# Patient Record
Sex: MALE | Race: WHITE | NOT HISPANIC OR LATINO | Employment: OTHER | URBAN - METROPOLITAN AREA
[De-identification: names, ages, dates, MRNs, and addresses within clinical notes are randomized per-mention and may not be internally consistent; named-entity substitution may affect disease eponyms.]

---

## 2017-02-21 ENCOUNTER — APPOINTMENT (OUTPATIENT)
Dept: PREADMISSION TESTING | Facility: HOSPITAL | Age: 72
End: 2017-02-21

## 2017-02-21 ENCOUNTER — HOSPITAL ENCOUNTER (OUTPATIENT)
Dept: GENERAL RADIOLOGY | Facility: HOSPITAL | Age: 72
Discharge: HOME | End: 2017-02-21

## 2017-02-21 ENCOUNTER — HOSPITAL ENCOUNTER (OUTPATIENT)
Dept: GENERAL RADIOLOGY | Facility: HOSPITAL | Age: 72
Discharge: HOME | End: 2017-02-21
Admitting: ORTHOPAEDIC SURGERY

## 2017-02-21 VITALS
SYSTOLIC BLOOD PRESSURE: 145 MMHG | TEMPERATURE: 97.6 F | HEART RATE: 78 BPM | HEIGHT: 66 IN | OXYGEN SATURATION: 97 % | WEIGHT: 191.19 LBS | BODY MASS INDEX: 30.73 KG/M2 | RESPIRATION RATE: 16 BRPM | DIASTOLIC BLOOD PRESSURE: 83 MMHG

## 2017-02-21 DIAGNOSIS — M25.552 LEFT HIP PAIN: Primary | ICD-10-CM

## 2017-02-21 LAB
ALBUMIN SERPL-MCNC: 4.5 G/DL (ref 3.5–5.2)
ALBUMIN/GLOB SERPL: 1.5 G/DL
ALP SERPL-CCNC: 102 U/L (ref 39–117)
ALT SERPL W P-5'-P-CCNC: 24 U/L (ref 1–41)
ANION GAP SERPL CALCULATED.3IONS-SCNC: 14 MMOL/L
APTT PPP: 27.5 SECONDS (ref 22.7–35.4)
AST SERPL-CCNC: 22 U/L (ref 1–40)
BASOPHILS # BLD AUTO: 0.04 10*3/MM3 (ref 0–0.2)
BASOPHILS NFR BLD AUTO: 0.5 % (ref 0–1.5)
BILIRUB SERPL-MCNC: 0.6 MG/DL (ref 0.1–1.2)
BILIRUB UR QL STRIP: NEGATIVE
BUN BLD-MCNC: 18 MG/DL (ref 8–23)
BUN/CREAT SERPL: 17.6 (ref 7–25)
CALCIUM SPEC-SCNC: 9.3 MG/DL (ref 8.6–10.5)
CHLORIDE SERPL-SCNC: 101 MMOL/L (ref 98–107)
CLARITY UR: CLEAR
CO2 SERPL-SCNC: 25 MMOL/L (ref 22–29)
COLOR UR: YELLOW
CREAT BLD-MCNC: 1.02 MG/DL (ref 0.76–1.27)
DEPRECATED RDW RBC AUTO: 48.7 FL (ref 37–54)
EOSINOPHIL # BLD AUTO: 0.32 10*3/MM3 (ref 0–0.7)
EOSINOPHIL NFR BLD AUTO: 3.7 % (ref 0.3–6.2)
ERYTHROCYTE [DISTWIDTH] IN BLOOD BY AUTOMATED COUNT: 15.2 % (ref 11.5–14.5)
GFR SERPL CREATININE-BSD FRML MDRD: 72 ML/MIN/1.73
GLOBULIN UR ELPH-MCNC: 3 GM/DL
GLUCOSE BLD-MCNC: 89 MG/DL (ref 65–99)
GLUCOSE UR STRIP-MCNC: NEGATIVE MG/DL
HCT VFR BLD AUTO: 45.3 % (ref 40.4–52.2)
HGB BLD-MCNC: 15.7 G/DL (ref 13.7–17.6)
HGB UR QL STRIP.AUTO: NEGATIVE
IMM GRANULOCYTES # BLD: 0.04 10*3/MM3 (ref 0–0.03)
IMM GRANULOCYTES NFR BLD: 0.5 % (ref 0–0.5)
INR PPP: 0.97 (ref 0.9–1.1)
KETONES UR QL STRIP: NEGATIVE
LEUKOCYTE ESTERASE UR QL STRIP.AUTO: NEGATIVE
LYMPHOCYTES # BLD AUTO: 3.18 10*3/MM3 (ref 0.9–4.8)
LYMPHOCYTES NFR BLD AUTO: 36.5 % (ref 19.6–45.3)
MCH RBC QN AUTO: 30 PG (ref 27–32.7)
MCHC RBC AUTO-ENTMCNC: 34.7 G/DL (ref 32.6–36.4)
MCV RBC AUTO: 86.6 FL (ref 79.8–96.2)
MONOCYTES # BLD AUTO: 0.69 10*3/MM3 (ref 0.2–1.2)
MONOCYTES NFR BLD AUTO: 7.9 % (ref 5–12)
NEUTROPHILS # BLD AUTO: 4.44 10*3/MM3 (ref 1.9–8.1)
NEUTROPHILS NFR BLD AUTO: 50.9 % (ref 42.7–76)
NITRITE UR QL STRIP: NEGATIVE
PH UR STRIP.AUTO: <=5 [PH] (ref 5–8)
PLATELET # BLD AUTO: 225 10*3/MM3 (ref 140–500)
PMV BLD AUTO: 7.9 FL (ref 6–12)
POTASSIUM BLD-SCNC: 4.4 MMOL/L (ref 3.5–5.2)
PROT SERPL-MCNC: 7.5 G/DL (ref 6–8.5)
PROT UR QL STRIP: NEGATIVE
PROTHROMBIN TIME: 12.5 SECONDS (ref 11.7–14.2)
RBC # BLD AUTO: 5.23 10*6/MM3 (ref 4.6–6)
SODIUM BLD-SCNC: 140 MMOL/L (ref 136–145)
SP GR UR STRIP: 1.02 (ref 1–1.03)
UROBILINOGEN UR QL STRIP: NORMAL
WBC NRBC COR # BLD: 8.71 10*3/MM3 (ref 4.5–10.7)

## 2017-02-21 PROCEDURE — 73502 X-RAY EXAM HIP UNI 2-3 VIEWS: CPT

## 2017-02-21 PROCEDURE — G8978 MOBILITY CURRENT STATUS: HCPCS

## 2017-02-21 PROCEDURE — 85730 THROMBOPLASTIN TIME PARTIAL: CPT | Performed by: ORTHOPAEDIC SURGERY

## 2017-02-21 PROCEDURE — G8980 MOBILITY D/C STATUS: HCPCS

## 2017-02-21 PROCEDURE — 80053 COMPREHEN METABOLIC PANEL: CPT | Performed by: ORTHOPAEDIC SURGERY

## 2017-02-21 PROCEDURE — 36415 COLL VENOUS BLD VENIPUNCTURE: CPT

## 2017-02-21 PROCEDURE — 85025 COMPLETE CBC W/AUTO DIFF WBC: CPT | Performed by: ORTHOPAEDIC SURGERY

## 2017-02-21 PROCEDURE — 81003 URINALYSIS AUTO W/O SCOPE: CPT | Performed by: ORTHOPAEDIC SURGERY

## 2017-02-21 PROCEDURE — 97161 PT EVAL LOW COMPLEX 20 MIN: CPT

## 2017-02-21 PROCEDURE — 71020 HC CHEST PA AND LATERAL: CPT

## 2017-02-21 PROCEDURE — 85610 PROTHROMBIN TIME: CPT | Performed by: ORTHOPAEDIC SURGERY

## 2017-02-21 PROCEDURE — G8979 MOBILITY GOAL STATUS: HCPCS

## 2017-02-21 RX ORDER — IBUPROFEN 400 MG/1
400 TABLET ORAL EVERY 6 HOURS PRN
COMMUNITY
End: 2020-06-29 | Stop reason: ALTCHOICE

## 2017-02-21 RX ORDER — ATORVASTATIN CALCIUM 20 MG/1
20 TABLET, FILM COATED ORAL EVERY EVENING
COMMUNITY

## 2017-02-21 RX ORDER — CHLORHEXIDINE GLUCONATE 500 MG/1
1 CLOTH TOPICAL TAKE AS DIRECTED
Status: ON HOLD | COMMUNITY
End: 2017-03-09

## 2017-02-21 NOTE — DISCHARGE INSTRUCTIONS
Take the following medications the morning of surgery with a small sip of water.    NONE    General Instructions:  • Do not eat or drink after midnight: includes water, mints, or gum. You may brush your teeth.  • Do not smoke, chew tobacco, or drink alcohol.  • Bring medications in original bottles, any inhalers and if applicable your C-PAP/ BI-PAP machine.  • Bring any papers given to you in the doctor’s office.  • Wear clean comfortable clothes and socks.  • Leave all other valuables and jewelry at home.        2% CHLORAHEXIDINE GLUCONATE* CLOTH  Preparing or “prepping” skin before surgery can reduce the risk of infection at the surgical site. To make the process easier, [facility] has chosen disposable cloths moistened with a rinse-free, 2% Chlorhexidine Gluconate (CHG) antiseptic solution. The steps below outline the prepping process and should be carefully followed.        Use the prep cloth on the area that is circled in the diagram             Directions Night before Surgery  1) Shower using a fresh bar of anti-bacterial soap (such as Dial) and clean washcloth.  Use a clean towel to completely dry your skin.  2) Do not use any lotions, oils or creams on your skin.  3) Open the package and remove 1 cloth, wipe your skin for 30 seconds in a circular motion.  Allow to dry for 3 minutes.  4) Repeat #3 with second cloth.  5) Do not touch your eyes, ears, or mouth with the prep cloth.  6) Allow the wet prep solution to air dry.  7) Discard the prep cloth and wash your hands with soap and water.   8) Dress in clean bed clothes and sleep on fresh clean bed sheets.   9) You may experience some temporary itching after the prep.    Directions Day of Surgery  1) Repeat steps 1,2,3,4,5,6,7, and 9.   2) Dress in clean clothes before coming to the hospital.    BACTROBAN NASAL OINTMENT  There are many germs normally in your nose. Bactroban is an ointment that will help reduce these germs. Please follow these  instructions for Bactroban use:    __1__The day before surgery in the morning  [id number]_[id number]_______    _2___The day before surgery in the evening              Vjyo__6-8-63_____    _3___The day of surgery in the morning    Rsfi__4-1-63______    **Squirt ½ package of Bactroban Ointment onto a cotton applicator and apply to inside of 1st nostril.  Squirt the remaining Bactroban and apply to the inside of the other nostril.      Preventing a Surgical Site Infection:  Shower on the morning of surgery using a fresh bar of anti-bacterial soap (such as Dial) and clean washcloth.  Dry with a clean towel and dress in clean clothing.  For 2 to 3 days before surgery, avoid shaving with a razor near where you will have surgery because the razor can irritate skin and make it easier to develop an infection  Ask your surgeon if you will be receiving antibiotics prior to surgery  Make sure you, your family, and all healthcare providers clean their hands with soap and water or an alcohol based hand  before caring for you or your wound  If at all possible, quit smoking as many days before surgery as you can.    Day of surgery:  Upon arrival, a Pre-op nurse and Anesthesiologist will review your health history, obtain vital signs, and answer questions you may have.  The only belongings needed at this time will be your home medications and if applicable your C-PAP/BI-PAP machine.  If you are staying overnight your family can leave the rest of your belongings in the car and bring them to your room later.  A Pre-op nurse will start an IV and you may receive medication in preparation for surgery, including something to help you relax.  Your family will be able to see you in the Pre-op area.  While you are in surgery your family should notify the waiting room  if they leave the waiting room area and provide a contact phone number.    Please be aware that surgery does come with discomfort.  We want to make every effort to  control your discomfort so please discuss any uncontrolled symptoms with your nurse.   Your doctor will most likely have prescribed pain medications.        If you are staying overnight following surgery, you will be transported to your hospital room following the recovery period.  [facility] has all private rooms.    If you have any questions please call Pre-Admission Testing at [phone].    Deductibles and co-payments are collected on the day of service. Please be prepared to pay the required co-pay, deductible or deposit on the day of service as defined by your plan.

## 2017-02-21 NOTE — PROGRESS NOTES
Physical Therapy Outpatient Preoperative Total Joint Evaluation     Patient Name: José Moody  : 1945  MRN: [id number]  Today's Date: 2017         Surgery Date: 17    Patient Active Problem List   Diagnosis   • Abnormal thallium stress test   • Atrial fibrillation   • S/P CABG (coronary artery bypass graft)   • Chest pain   • Hyperlipidemia   • Shortness of breath   • Weak pulse   • SOB (shortness of breath)   • Chronic coronary artery disease        Past Medical History   Diagnosis Date   • Anxiety    • Arthritis      LT HIP   • Coronary artery disease    • High cholesterol    • History of atrial fibrillation      LAST HAD    • Limited joint range of motion      LT HIP   • PONV (postoperative nausea and vomiting)      AFTER TONSILLECTOMY        Past Surgical History   Procedure Laterality Date   • Coronary artery bypass graft       3 VESSELS    • Tonsillectomy     • Colonoscopy                TOTAL JOINT PREOP EVAL (last 72 hours)      Total Joint Preop Evaluation       17 1150                Preoperative Evaluation    Surgery THR: Left  -TV        Surgery Date 17  -TV        Previous Total Joint No  -TV        Attended Class yes  -TV        Prior Activity Status    All Household independent  -TV        All Community independent  -TV        Gait independent   cane  -TV        Transfers independent  -TV         Child  -TV        DC Plans Home  -TV        Assist at home Child  -TV        Home Environment 1 story;basement;lives first floor  -TV        Exterior steps none   2  -TV        Interior steps 1 rail   14  -TV        Pain 0-10    Pain Level 7  -TV        Location l hip  -TV        LE Measurements - ROM    Hip Flexion - Right AROM is WNL;Strength is grossly > or equal to 3/5  -TV        Hip Abduction - Right AROM is WNL;Strength is grossly > or equal to 3/5  -TV        Knee Flexion - Right AROM is WNL;Strength is grossly > or equal to 3/5  -TV         Knee Extension - Right AROM is WNL;Strength is grossly > or equal to 3/5  -TV        Hip Flexion - Left --   hip motion very limited and painful in all planes  -TV        Knee Flexion - Left AROM is WNL;Strength is grossly > or equal to 3/5  -TV        Knee Extension - Left AROM is WNL;Strength is grossly > or equal to 3/5  -TV        UE ROM/Strength    UE ROM/Strength adequate for walker use  -TV        Other Measurements    Sensation/Circulation intact  -TV        Gait Observation cane  -TV        Equipment    Equipment Patient Has Cane;Walker  -TV        Equipment Patient Needs Bedside commode  -TV        ASSESSMENT    Goal Patient demonstrates good understanding of post-op P.T.;Exercises;Surgical Procedure  -TV        Goal Met? Yes  -TV        Anticipated Progress good  -TV        Patient agrees with Plan of Treatment? Yes  -TV        Plan Will see for post op TJR protocol  -TV          User Key  (r) = Recorded By, (t) = Taken By, (c) = Cosigned By    Initials Name Effective Dates    TV Charley Laird, PT 01/07/16 -              Time Calculation:          Therapy Charges for Today     Code Description Service Date Service Provider Modifiers Qty    48537344909 HC PT MOBILITY CURRENT 2/21/2017 Charley Laird, PT GP, CI 1    86737857143 HC PT MOBILITY PROJECTED 2/21/2017 Charley Laird, PT GP, CI 1    93061540407 HC PT MOBILITY DISCHARGE 2/21/2017 Charley Laird, PT GP, CI 1    77547160724 HC PAT-TOTAL JOINT CLASS 2/21/2017 Charley Laird, PT  1    77022285938 HC PT EVAL LOW COMPLEXITY 1 2/21/2017 Charley Laird, PT GP 1            PT G-Codes  PT Professional Judgement Used?: Yes  Functional Limitation: Mobility: Walking and moving around  Mobility: Walking and Moving Around Current Status (): At least 1 percent but less than 20 percent impaired, limited or restricted  Mobility: Walking and Moving Around Goal Status (): At least 1 percent but less than 20 percent impaired, limited or restricted  Mobility: Walking and  Moving Around Discharge Status (): At least 1 percent but less than 20 percent impaired, limited or restricted      Charley Laird, PT  2/21/2017

## 2017-03-09 ENCOUNTER — APPOINTMENT (OUTPATIENT)
Dept: GENERAL RADIOLOGY | Facility: HOSPITAL | Age: 72
End: 2017-03-09

## 2017-03-09 ENCOUNTER — ANESTHESIA (OUTPATIENT)
Dept: PERIOP | Facility: HOSPITAL | Age: 72
End: 2017-03-09

## 2017-03-09 ENCOUNTER — HOSPITAL ENCOUNTER (INPATIENT)
Facility: HOSPITAL | Age: 72
LOS: 1 days | Discharge: HOME HEALTH | End: 2017-03-10
Attending: ORTHOPAEDIC SURGERY | Admitting: ORTHOPAEDIC SURGERY

## 2017-03-09 ENCOUNTER — ANESTHESIA EVENT (OUTPATIENT)
Dept: PERIOP | Facility: HOSPITAL | Age: 72
End: 2017-03-09

## 2017-03-09 DIAGNOSIS — M16.10 PRIMARY OSTEOARTHRITIS OF HIP, UNSPECIFIED LATERALITY: Primary | ICD-10-CM

## 2017-03-09 PROBLEM — M16.9 DEGENERATIVE JOINT DISEASE (DJD) OF HIP: Status: ACTIVE | Noted: 2017-03-09

## 2017-03-09 LAB
ABO GROUP BLD: NORMAL
BLD GP AB SCN SERPL QL: NEGATIVE
RH BLD: POSITIVE
TROPONIN T SERPL-MCNC: <0.01 NG/ML (ref 0–0.03)

## 2017-03-09 PROCEDURE — 25010000002 FENTANYL CITRATE (PF) 100 MCG/2ML SOLUTION: Performed by: NURSE ANESTHETIST, CERTIFIED REGISTERED

## 2017-03-09 PROCEDURE — 25010000002 MIDAZOLAM PER 1 MG: Performed by: ANESTHESIOLOGY

## 2017-03-09 PROCEDURE — 25010000002 ONDANSETRON PER 1 MG: Performed by: NURSE ANESTHETIST, CERTIFIED REGISTERED

## 2017-03-09 PROCEDURE — 73501 X-RAY EXAM HIP UNI 1 VIEW: CPT

## 2017-03-09 PROCEDURE — 86901 BLOOD TYPING SEROLOGIC RH(D): CPT | Performed by: ORTHOPAEDIC SURGERY

## 2017-03-09 PROCEDURE — 25010000002 PROPOFOL 10 MG/ML EMULSION: Performed by: NURSE ANESTHETIST, CERTIFIED REGISTERED

## 2017-03-09 PROCEDURE — C1776 JOINT DEVICE (IMPLANTABLE): HCPCS | Performed by: ORTHOPAEDIC SURGERY

## 2017-03-09 PROCEDURE — 25010000002 KETOROLAC TROMETHAMINE PER 15 MG: Performed by: ORTHOPAEDIC SURGERY

## 2017-03-09 PROCEDURE — 86850 RBC ANTIBODY SCREEN: CPT | Performed by: ORTHOPAEDIC SURGERY

## 2017-03-09 PROCEDURE — 93005 ELECTROCARDIOGRAM TRACING: CPT | Performed by: NURSE ANESTHETIST, CERTIFIED REGISTERED

## 2017-03-09 PROCEDURE — 25010000002 DEXAMETHASONE PER 1 MG: Performed by: NURSE ANESTHETIST, CERTIFIED REGISTERED

## 2017-03-09 PROCEDURE — 0SRB04Z REPLACEMENT OF LEFT HIP JOINT WITH CERAMIC ON POLYETHYLENE SYNTHETIC SUBSTITUTE, OPEN APPROACH: ICD-10-PCS | Performed by: ORTHOPAEDIC SURGERY

## 2017-03-09 PROCEDURE — 25010000003 CEFAZOLIN IN DEXTROSE 2-4 GM/100ML-% SOLUTION: Performed by: ORTHOPAEDIC SURGERY

## 2017-03-09 PROCEDURE — 25010000002 HYDROMORPHONE PER 4 MG: Performed by: NURSE ANESTHETIST, CERTIFIED REGISTERED

## 2017-03-09 PROCEDURE — 93010 ELECTROCARDIOGRAM REPORT: CPT | Performed by: INTERNAL MEDICINE

## 2017-03-09 PROCEDURE — 25010000002 VANCOMYCIN PER 500 MG: Performed by: ORTHOPAEDIC SURGERY

## 2017-03-09 PROCEDURE — 86900 BLOOD TYPING SEROLOGIC ABO: CPT | Performed by: ORTHOPAEDIC SURGERY

## 2017-03-09 PROCEDURE — 94799 UNLISTED PULMONARY SVC/PX: CPT

## 2017-03-09 PROCEDURE — 84484 ASSAY OF TROPONIN QUANT: CPT | Performed by: ORTHOPAEDIC SURGERY

## 2017-03-09 DEVICE — IMPLANTABLE DEVICE
Type: IMPLANTABLE DEVICE | Site: HIP | Status: FUNCTIONAL
Brand: G7® ACETABULAR SYSTEM

## 2017-03-09 DEVICE — IMPLANTABLE DEVICE
Type: IMPLANTABLE DEVICE | Site: HIP | Status: FUNCTIONAL
Brand: G7 ACETABULAR LINER

## 2017-03-09 DEVICE — PRT HIP PRIMARY MIXED CONSTRUCT 1 OF 2: Type: IMPLANTABLE DEVICE | Site: HIP | Status: FUNCTIONAL

## 2017-03-09 DEVICE — BIOLOX® DELTA, CERAMIC FEMORAL HEAD, S, Ø 36/-3.5, TAPER 12/14
Type: IMPLANTABLE DEVICE | Site: HIP | Status: FUNCTIONAL
Brand: BIOLOX® DELTA

## 2017-03-09 DEVICE — PRT HIP PRIMARY MIXED CONSTRUCT 2 OF 2: Type: IMPLANTABLE DEVICE | Site: HIP | Status: FUNCTIONAL

## 2017-03-09 DEVICE — IMPLANTABLE DEVICE
Type: IMPLANTABLE DEVICE | Site: HIP | Status: FUNCTIONAL
Brand: G7 ACETABULAR SCREW

## 2017-03-09 RX ORDER — NALOXONE HCL 0.4 MG/ML
0.2 VIAL (ML) INJECTION AS NEEDED
Status: DISCONTINUED | OUTPATIENT
Start: 2017-03-09 | End: 2017-03-09 | Stop reason: HOSPADM

## 2017-03-09 RX ORDER — ATORVASTATIN CALCIUM 20 MG/1
20 TABLET, FILM COATED ORAL EVERY EVENING
Status: DISCONTINUED | OUTPATIENT
Start: 2017-03-09 | End: 2017-03-10 | Stop reason: HOSPADM

## 2017-03-09 RX ORDER — MIDAZOLAM HYDROCHLORIDE 1 MG/ML
1 INJECTION INTRAMUSCULAR; INTRAVENOUS
Status: DISCONTINUED | OUTPATIENT
Start: 2017-03-09 | End: 2017-03-09 | Stop reason: HOSPADM

## 2017-03-09 RX ORDER — CLINDAMYCIN PHOSPHATE 900 MG/50ML
900 INJECTION INTRAVENOUS ONCE
Status: DISCONTINUED | OUTPATIENT
Start: 2017-03-09 | End: 2017-03-09

## 2017-03-09 RX ORDER — SODIUM CHLORIDE 0.9 % (FLUSH) 0.9 %
1-10 SYRINGE (ML) INJECTION AS NEEDED
Status: DISCONTINUED | OUTPATIENT
Start: 2017-03-09 | End: 2017-03-09 | Stop reason: HOSPADM

## 2017-03-09 RX ORDER — FENTANYL CITRATE 50 UG/ML
INJECTION, SOLUTION INTRAMUSCULAR; INTRAVENOUS AS NEEDED
Status: DISCONTINUED | OUTPATIENT
Start: 2017-03-09 | End: 2017-03-09 | Stop reason: SURG

## 2017-03-09 RX ORDER — FLUMAZENIL 0.1 MG/ML
0.2 INJECTION INTRAVENOUS AS NEEDED
Status: DISCONTINUED | OUTPATIENT
Start: 2017-03-09 | End: 2017-03-09 | Stop reason: HOSPADM

## 2017-03-09 RX ORDER — LABETALOL HYDROCHLORIDE 5 MG/ML
5 INJECTION, SOLUTION INTRAVENOUS
Status: DISCONTINUED | OUTPATIENT
Start: 2017-03-09 | End: 2017-03-09 | Stop reason: HOSPADM

## 2017-03-09 RX ORDER — ACETAMINOPHEN 500 MG
1000 TABLET ORAL ONCE
Status: COMPLETED | OUTPATIENT
Start: 2017-03-09 | End: 2017-03-09

## 2017-03-09 RX ORDER — CEFAZOLIN SODIUM 2 G/100ML
2 INJECTION, SOLUTION INTRAVENOUS ONCE
Status: DISCONTINUED | OUTPATIENT
Start: 2017-03-09 | End: 2017-03-09

## 2017-03-09 RX ORDER — ASPIRIN 325 MG
325 TABLET, DELAYED RELEASE (ENTERIC COATED) ORAL DAILY
Status: DISCONTINUED | OUTPATIENT
Start: 2017-03-09 | End: 2017-03-10 | Stop reason: HOSPADM

## 2017-03-09 RX ORDER — DOCUSATE SODIUM 100 MG/1
100 CAPSULE, LIQUID FILLED ORAL 2 TIMES DAILY PRN
Status: DISCONTINUED | OUTPATIENT
Start: 2017-03-09 | End: 2017-03-10 | Stop reason: HOSPADM

## 2017-03-09 RX ORDER — SODIUM CHLORIDE, SODIUM LACTATE, POTASSIUM CHLORIDE, CALCIUM CHLORIDE 600; 310; 30; 20 MG/100ML; MG/100ML; MG/100ML; MG/100ML
9 INJECTION, SOLUTION INTRAVENOUS CONTINUOUS
Status: DISCONTINUED | OUTPATIENT
Start: 2017-03-09 | End: 2017-03-10 | Stop reason: HOSPADM

## 2017-03-09 RX ORDER — SODIUM CHLORIDE, SODIUM LACTATE, POTASSIUM CHLORIDE, CALCIUM CHLORIDE 600; 310; 30; 20 MG/100ML; MG/100ML; MG/100ML; MG/100ML
100 INJECTION, SOLUTION INTRAVENOUS CONTINUOUS
Status: DISCONTINUED | OUTPATIENT
Start: 2017-03-09 | End: 2017-03-10 | Stop reason: HOSPADM

## 2017-03-09 RX ORDER — DIPHENHYDRAMINE HCL 25 MG
25 CAPSULE ORAL EVERY 6 HOURS PRN
Status: DISCONTINUED | OUTPATIENT
Start: 2017-03-09 | End: 2017-03-10 | Stop reason: HOSPADM

## 2017-03-09 RX ORDER — BISACODYL 10 MG
10 SUPPOSITORY, RECTAL RECTAL DAILY PRN
Status: DISCONTINUED | OUTPATIENT
Start: 2017-03-09 | End: 2017-03-10 | Stop reason: HOSPADM

## 2017-03-09 RX ORDER — ONDANSETRON 2 MG/ML
4 INJECTION INTRAMUSCULAR; INTRAVENOUS ONCE AS NEEDED
Status: DISCONTINUED | OUTPATIENT
Start: 2017-03-09 | End: 2017-03-09 | Stop reason: HOSPADM

## 2017-03-09 RX ORDER — ACETAMINOPHEN 325 MG/1
650 TABLET ORAL EVERY 4 HOURS PRN
Status: DISCONTINUED | OUTPATIENT
Start: 2017-03-09 | End: 2017-03-10 | Stop reason: HOSPADM

## 2017-03-09 RX ORDER — MIDAZOLAM HYDROCHLORIDE 1 MG/ML
2 INJECTION INTRAMUSCULAR; INTRAVENOUS
Status: DISCONTINUED | OUTPATIENT
Start: 2017-03-09 | End: 2017-03-09 | Stop reason: HOSPADM

## 2017-03-09 RX ORDER — GLYCOPYRROLATE 0.2 MG/ML
INJECTION INTRAMUSCULAR; INTRAVENOUS AS NEEDED
Status: DISCONTINUED | OUTPATIENT
Start: 2017-03-09 | End: 2017-03-09 | Stop reason: SURG

## 2017-03-09 RX ORDER — FERROUS SULFATE 325(65) MG
325 TABLET ORAL
Status: DISCONTINUED | OUTPATIENT
Start: 2017-03-10 | End: 2017-03-10 | Stop reason: HOSPADM

## 2017-03-09 RX ORDER — DEXAMETHASONE SODIUM PHOSPHATE 10 MG/ML
INJECTION INTRAMUSCULAR; INTRAVENOUS AS NEEDED
Status: DISCONTINUED | OUTPATIENT
Start: 2017-03-09 | End: 2017-03-09 | Stop reason: SURG

## 2017-03-09 RX ORDER — FENTANYL CITRATE 50 UG/ML
50 INJECTION, SOLUTION INTRAMUSCULAR; INTRAVENOUS
Status: DISCONTINUED | OUTPATIENT
Start: 2017-03-09 | End: 2017-03-09 | Stop reason: HOSPADM

## 2017-03-09 RX ORDER — ONDANSETRON 4 MG/1
4 TABLET, FILM COATED ORAL EVERY 6 HOURS PRN
Status: DISCONTINUED | OUTPATIENT
Start: 2017-03-09 | End: 2017-03-10 | Stop reason: HOSPADM

## 2017-03-09 RX ORDER — PROMETHAZINE HYDROCHLORIDE 25 MG/1
12.5 TABLET ORAL ONCE AS NEEDED
Status: DISCONTINUED | OUTPATIENT
Start: 2017-03-09 | End: 2017-03-09 | Stop reason: HOSPADM

## 2017-03-09 RX ORDER — KETOROLAC TROMETHAMINE 30 MG/ML
30 INJECTION, SOLUTION INTRAMUSCULAR; INTRAVENOUS EVERY 6 HOURS
Status: COMPLETED | OUTPATIENT
Start: 2017-03-09 | End: 2017-03-10

## 2017-03-09 RX ORDER — OXYCODONE HYDROCHLORIDE AND ACETAMINOPHEN 5; 325 MG/1; MG/1
2 TABLET ORAL EVERY 4 HOURS PRN
Status: DISCONTINUED | OUTPATIENT
Start: 2017-03-09 | End: 2017-03-10 | Stop reason: HOSPADM

## 2017-03-09 RX ORDER — HYDROMORPHONE HYDROCHLORIDE 1 MG/ML
0.5 INJECTION, SOLUTION INTRAMUSCULAR; INTRAVENOUS; SUBCUTANEOUS
Status: DISCONTINUED | OUTPATIENT
Start: 2017-03-09 | End: 2017-03-09 | Stop reason: HOSPADM

## 2017-03-09 RX ORDER — FAMOTIDINE 10 MG/ML
20 INJECTION, SOLUTION INTRAVENOUS ONCE
Status: COMPLETED | OUTPATIENT
Start: 2017-03-09 | End: 2017-03-09

## 2017-03-09 RX ORDER — OXYCODONE AND ACETAMINOPHEN 7.5; 325 MG/1; MG/1
1 TABLET ORAL ONCE AS NEEDED
Status: DISCONTINUED | OUTPATIENT
Start: 2017-03-09 | End: 2017-03-09 | Stop reason: HOSPADM

## 2017-03-09 RX ORDER — LABETALOL HYDROCHLORIDE 5 MG/ML
INJECTION, SOLUTION INTRAVENOUS AS NEEDED
Status: DISCONTINUED | OUTPATIENT
Start: 2017-03-09 | End: 2017-03-09 | Stop reason: SURG

## 2017-03-09 RX ORDER — TRANEXAMIC ACID 100 MG/ML
INJECTION, SOLUTION INTRAVENOUS AS NEEDED
Status: DISCONTINUED | OUTPATIENT
Start: 2017-03-09 | End: 2017-03-09 | Stop reason: SURG

## 2017-03-09 RX ORDER — PROPOFOL 10 MG/ML
VIAL (ML) INTRAVENOUS AS NEEDED
Status: DISCONTINUED | OUTPATIENT
Start: 2017-03-09 | End: 2017-03-09 | Stop reason: SURG

## 2017-03-09 RX ORDER — ONDANSETRON 2 MG/ML
INJECTION INTRAMUSCULAR; INTRAVENOUS AS NEEDED
Status: DISCONTINUED | OUTPATIENT
Start: 2017-03-09 | End: 2017-03-09 | Stop reason: SURG

## 2017-03-09 RX ORDER — MAGNESIUM HYDROXIDE 1200 MG/15ML
LIQUID ORAL AS NEEDED
Status: DISCONTINUED | OUTPATIENT
Start: 2017-03-09 | End: 2017-03-09 | Stop reason: HOSPADM

## 2017-03-09 RX ORDER — ACETAMINOPHEN 325 MG/1
325 TABLET ORAL EVERY 4 HOURS PRN
Status: DISCONTINUED | OUTPATIENT
Start: 2017-03-09 | End: 2017-03-10 | Stop reason: HOSPADM

## 2017-03-09 RX ORDER — LIDOCAINE HYDROCHLORIDE 20 MG/ML
INJECTION, SOLUTION INFILTRATION; PERINEURAL AS NEEDED
Status: DISCONTINUED | OUTPATIENT
Start: 2017-03-09 | End: 2017-03-09 | Stop reason: SURG

## 2017-03-09 RX ORDER — ONDANSETRON 2 MG/ML
4 INJECTION INTRAMUSCULAR; INTRAVENOUS EVERY 6 HOURS PRN
Status: DISCONTINUED | OUTPATIENT
Start: 2017-03-09 | End: 2017-03-10 | Stop reason: HOSPADM

## 2017-03-09 RX ORDER — OXYCODONE HYDROCHLORIDE AND ACETAMINOPHEN 5; 325 MG/1; MG/1
1 TABLET ORAL EVERY 4 HOURS PRN
Status: DISCONTINUED | OUTPATIENT
Start: 2017-03-09 | End: 2017-03-10 | Stop reason: HOSPADM

## 2017-03-09 RX ORDER — CEFAZOLIN SODIUM 2 G/100ML
2 INJECTION, SOLUTION INTRAVENOUS EVERY 8 HOURS
Status: COMPLETED | OUTPATIENT
Start: 2017-03-09 | End: 2017-03-10

## 2017-03-09 RX ORDER — DIPHENHYDRAMINE HYDROCHLORIDE 50 MG/ML
12.5 INJECTION INTRAMUSCULAR; INTRAVENOUS
Status: DISCONTINUED | OUTPATIENT
Start: 2017-03-09 | End: 2017-03-09 | Stop reason: HOSPADM

## 2017-03-09 RX ORDER — HYDROCODONE BITARTRATE AND ACETAMINOPHEN 7.5; 325 MG/1; MG/1
1 TABLET ORAL ONCE AS NEEDED
Status: DISCONTINUED | OUTPATIENT
Start: 2017-03-09 | End: 2017-03-09 | Stop reason: HOSPADM

## 2017-03-09 RX ORDER — ONDANSETRON 4 MG/1
4 TABLET, ORALLY DISINTEGRATING ORAL EVERY 6 HOURS PRN
Status: DISCONTINUED | OUTPATIENT
Start: 2017-03-09 | End: 2017-03-10 | Stop reason: HOSPADM

## 2017-03-09 RX ORDER — ROCURONIUM BROMIDE 10 MG/ML
INJECTION, SOLUTION INTRAVENOUS AS NEEDED
Status: DISCONTINUED | OUTPATIENT
Start: 2017-03-09 | End: 2017-03-09 | Stop reason: SURG

## 2017-03-09 RX ORDER — HYDRALAZINE HYDROCHLORIDE 20 MG/ML
5 INJECTION INTRAMUSCULAR; INTRAVENOUS
Status: DISCONTINUED | OUTPATIENT
Start: 2017-03-09 | End: 2017-03-09 | Stop reason: HOSPADM

## 2017-03-09 RX ORDER — NALOXONE HCL 0.4 MG/ML
0.1 VIAL (ML) INJECTION
Status: DISCONTINUED | OUTPATIENT
Start: 2017-03-09 | End: 2017-03-10 | Stop reason: HOSPADM

## 2017-03-09 RX ADMIN — KETOROLAC TROMETHAMINE 30 MG: 30 INJECTION, SOLUTION INTRAMUSCULAR at 21:15

## 2017-03-09 RX ADMIN — FENTANYL CITRATE 50 MCG: 50 INJECTION, SOLUTION INTRAMUSCULAR; INTRAVENOUS at 13:04

## 2017-03-09 RX ADMIN — OXYCODONE HYDROCHLORIDE AND ACETAMINOPHEN 2 TABLET: 5; 325 TABLET ORAL at 23:15

## 2017-03-09 RX ADMIN — HYDROMORPHONE HYDROCHLORIDE 0.5 MG: 1 INJECTION, SOLUTION INTRAMUSCULAR; INTRAVENOUS; SUBCUTANEOUS at 14:35

## 2017-03-09 RX ADMIN — FENTANYL CITRATE 50 MCG: 50 INJECTION, SOLUTION INTRAMUSCULAR; INTRAVENOUS at 13:00

## 2017-03-09 RX ADMIN — FENTANYL CITRATE 50 MCG: 50 INJECTION, SOLUTION INTRAMUSCULAR; INTRAVENOUS at 12:01

## 2017-03-09 RX ADMIN — OXYCODONE HYDROCHLORIDE AND ACETAMINOPHEN 2 TABLET: 5; 325 TABLET ORAL at 18:18

## 2017-03-09 RX ADMIN — ONDANSETRON 4 MG: 2 INJECTION INTRAMUSCULAR; INTRAVENOUS at 13:17

## 2017-03-09 RX ADMIN — TRANEXAMIC ACID 1000 MG: 100 INJECTION, SOLUTION INTRAVENOUS at 12:32

## 2017-03-09 RX ADMIN — FENTANYL CITRATE 50 MCG: 50 INJECTION INTRAMUSCULAR; INTRAVENOUS at 15:11

## 2017-03-09 RX ADMIN — EPHEDRINE SULFATE 10 MG: 50 INJECTION INTRAMUSCULAR; INTRAVENOUS; SUBCUTANEOUS at 13:15

## 2017-03-09 RX ADMIN — VANCOMYCIN HYDROCHLORIDE 1500 MG: 1 INJECTION, POWDER, LYOPHILIZED, FOR SOLUTION INTRAVENOUS at 10:15

## 2017-03-09 RX ADMIN — LABETALOL HYDROCHLORIDE 10 MG: 5 INJECTION, SOLUTION INTRAVENOUS at 13:34

## 2017-03-09 RX ADMIN — ROCURONIUM BROMIDE 40 MG: 10 INJECTION INTRAVENOUS at 12:01

## 2017-03-09 RX ADMIN — PROPOFOL 200 MG: 10 INJECTION, EMULSION INTRAVENOUS at 12:01

## 2017-03-09 RX ADMIN — KETOROLAC TROMETHAMINE 30 MG: 30 INJECTION, SOLUTION INTRAMUSCULAR at 14:28

## 2017-03-09 RX ADMIN — EPHEDRINE SULFATE 10 MG: 50 INJECTION INTRAMUSCULAR; INTRAVENOUS; SUBCUTANEOUS at 12:32

## 2017-03-09 RX ADMIN — MIDAZOLAM HYDROCHLORIDE 2 MG: 1 INJECTION, SOLUTION INTRAMUSCULAR; INTRAVENOUS at 09:47

## 2017-03-09 RX ADMIN — ROCURONIUM BROMIDE 10 MG: 10 INJECTION INTRAVENOUS at 13:00

## 2017-03-09 RX ADMIN — SODIUM CHLORIDE, POTASSIUM CHLORIDE, SODIUM LACTATE AND CALCIUM CHLORIDE: 600; 310; 30; 20 INJECTION, SOLUTION INTRAVENOUS at 11:55

## 2017-03-09 RX ADMIN — DEXAMETHASONE SODIUM PHOSPHATE 8 MG: 10 INJECTION INTRAMUSCULAR; INTRAVENOUS at 13:18

## 2017-03-09 RX ADMIN — ATORVASTATIN CALCIUM 20 MG: 20 TABLET, FILM COATED ORAL at 21:14

## 2017-03-09 RX ADMIN — ASPIRIN 325 MG: 325 TABLET, DELAYED RELEASE ORAL at 21:14

## 2017-03-09 RX ADMIN — LABETALOL HYDROCHLORIDE 10 MG: 5 INJECTION, SOLUTION INTRAVENOUS at 13:45

## 2017-03-09 RX ADMIN — LIDOCAINE HYDROCHLORIDE 60 MG: 20 INJECTION, SOLUTION INFILTRATION; PERINEURAL at 12:01

## 2017-03-09 RX ADMIN — SODIUM CHLORIDE, POTASSIUM CHLORIDE, SODIUM LACTATE AND CALCIUM CHLORIDE 9 ML/HR: 600; 310; 30; 20 INJECTION, SOLUTION INTRAVENOUS at 14:52

## 2017-03-09 RX ADMIN — FENTANYL CITRATE 100 MCG: 50 INJECTION, SOLUTION INTRAMUSCULAR; INTRAVENOUS at 13:29

## 2017-03-09 RX ADMIN — FAMOTIDINE 20 MG: 10 INJECTION, SOLUTION INTRAVENOUS at 09:47

## 2017-03-09 RX ADMIN — GLYCOPYRROLATE 0.2 MG: 0.2 INJECTION INTRAMUSCULAR; INTRAVENOUS at 12:29

## 2017-03-09 RX ADMIN — EPHEDRINE SULFATE 10 MG: 50 INJECTION INTRAMUSCULAR; INTRAVENOUS; SUBCUTANEOUS at 12:55

## 2017-03-09 RX ADMIN — SODIUM CHLORIDE, POTASSIUM CHLORIDE, SODIUM LACTATE AND CALCIUM CHLORIDE 9 ML/HR: 600; 310; 30; 20 INJECTION, SOLUTION INTRAVENOUS at 09:44

## 2017-03-09 RX ADMIN — ACETAMINOPHEN 1000 MG: 500 TABLET ORAL at 09:34

## 2017-03-09 RX ADMIN — HYDROMORPHONE HYDROCHLORIDE 0.5 MG: 1 INJECTION, SOLUTION INTRAMUSCULAR; INTRAVENOUS; SUBCUTANEOUS at 15:11

## 2017-03-09 RX ADMIN — SODIUM CHLORIDE, POTASSIUM CHLORIDE, SODIUM LACTATE AND CALCIUM CHLORIDE: 600; 310; 30; 20 INJECTION, SOLUTION INTRAVENOUS at 12:53

## 2017-03-09 RX ADMIN — SUGAMMADEX 350 MG: 100 INJECTION, SOLUTION INTRAVENOUS at 13:25

## 2017-03-09 RX ADMIN — CEFAZOLIN SODIUM 2 G: 2 INJECTION, SOLUTION INTRAVENOUS at 18:17

## 2017-03-09 NOTE — PLAN OF CARE
Problem: Patient Care Overview (Adult)  Goal: Plan of Care Review  Outcome: Ongoing (interventions implemented as appropriate)    03/09/17 0939   Coping/Psychosocial Response Interventions   Plan Of Care Reviewed With patient   Patient Care Overview   Progress no change       Goal: Adult Individualization and Mutuality  Outcome: Ongoing (interventions implemented as appropriate)    03/09/17 0939   Individualization   Patient Specific Preferences none       Goal: Discharge Needs Assessment  Outcome: Ongoing (interventions implemented as appropriate)    03/09/17 0939   Discharge Needs Assessment   Concerns To Be Addressed no discharge needs identified;denies needs/concerns at this time   Equipment Needed After Discharge cane, straight;walker, rolling   Self-Care   Equipment Currently Used at Home cane, straight         03/09/17 0939   Discharge Needs Assessment   Concerns To Be Addressed no discharge needs identified;denies needs/concerns at this time   Equipment Needed After Discharge cane, straight;walker, rolling   Self-Care   Equipment Currently Used at Home cane, straight         Problem: Perioperative Period (Adult)  Goal: Signs and Symptoms of Listed Potential Problems Will be Absent or Manageable (Perioperative Period)  Outcome: Ongoing (interventions implemented as appropriate)    03/09/17 0939   Perioperative Period   Problems Assessed (Perioperative Period) pain;infection;physiologic stress response   Problems Present (Perioperative Period) pain;physiologic stress response

## 2017-03-09 NOTE — OP NOTE
DATE OF PROCEDURE:  03/09/2017    POSTOPERATIVE DIAGNOSIS:  End-stage, primary osteoarthritis left hip.     POSTOPERATIVE DIAGNOSIS:  End-stage, primary osteoarthritis left hip.    ANESTHESIA:  General, supplemented by a periarticular Exparel/bupivacaine injection.     SURGEON:  José Machado MD     ASSISTANT:  Parker Hodges MD. Note as part of the surgical procedure, I utilized the services of an assistant surgeon; specifically, Dr. Parker Hodges. The assistant surgeon participated in crucial portion of the operation. Use of the assistant surgeon greatly reduced overall operative time, thus significantly reducing overall morbidity for the patient.     PROCEDURE PERFORMED:  Left total hip replacement.     IMPLANTS:  Biomet 54 mm G7 finned acetabular shell. Fixation was supplemented by a 6.5 x 35 mm dome screw. We used a 36 mm inner diameter hi-wall polyethylene acetabular liner. Femoral component was a #2 offset Avenir HA-coated implant. The head neck was -3.5 x 36 mm ceramic nonmodular.     MEDICATIONS:  Note than 1 g of IV Tranexamic acid was given at the beginning of the case.     ESTIMATED BLOOD LOSS:  250 mL.     COMPLICATIONS:  No complications. He did have ST depression on his EKG monitor intraoperatively and this will be evaluated postoperatively.    QUALITY MEASURES:  I have documented patient's current medications including dosage, frequency, and route of administration in medical record today. Conservative alternatives were discussed with the patient as part of the decision-making process prior surgery. Patient was evaluated immediately preoperatively for cardiovascular and thromboembolic risk factors. He has a history of coronary artery disease, but had no other acute risk factors. Prophylactic IV antibiotics were administered before the case began.     DESCRIPTION OF PROCEDURE:  Patient was turned with the left affected hip up. After a prep and drape, posterolateral skin incision was carried out.  Gluteus nikolay was split. The hip was internally rotated. The sciatic nerve was protected. The superior portion of the external rotators along with posterior, superior, and inferior capsule were divided. The hip was dislocated posteriorly. I osteotomized the femoral neck, just above the lesser trochanter. Retractors were placed around the acetabulum. I performed a capsulectomy as required for exposure. I progressively reamed the acetabulum to 53 mm. A 54 mm G7 finned acetabular shell was impacted in 25° forward flexion, 45° of abduction. Excellent inherent stability is achieved. This is supplemented by very firm 6.5 x 35 mm dome screw. Care was taken during insertion of the screw to protect the sciatic nerve and intrapelvic and anterior neurovascular structures. A 36 mm liner hi-wall was dialed posteriorly, impacted into position, and found to be stable.     Attention was turned to the femur. Lateral femoral cortical neck was removed. He had a very small, tight femoral canal type A bone. We reamed and rasped the canal for a #2 Avenir implant. Trial reduction revealed the hip to be exceedingly stable and the leg lengths near-equal with the offset #2 implant. The HA-coated Avenir #2 offset implant was impacted into anatomic 10° of anteversion. Final trial reduction revealed leg lengths equalized and the hip exceedingly stable with a -3.5 neck length. The 36 mm nonmodular version of this was impacted on the dried femoral neck and the hip was reduced one final time.     Wound was closed in layers with #1 Vicryl in the capsule and external rotator, #1 Vicryl in the fascia, 2-0 Vicryl in subcutaneous tissue, and staples in the skin over a 1/8th inch drain. Note that I injected my capsule, external rotators, and fascia with an Exparel solution containing 20 mL Exparel, 25 mL 0.25% bupivacaine with epinephrine, and 20 mL saline. Care was taken to protect the anterior neurovascular structures and the sciatic nerve during  injection.        José Machado M.D.  DINH:ms  D:   03/09/2017 13:40:32  T:   03/09/2017 15:53:58  Job ID:   [id number]  Document ID:   [id number]  cc:

## 2017-03-09 NOTE — BRIEF OP NOTE
TOTAL HIP ARTHROPLASTY  Procedure Note    José MARTÍN Fransisco  3/9/2017    Pre-op Diagnosis:   * No pre-op diagnosis entered *    Post-op Diagnosis:     * No Diagnosis Codes entered *    Procedure/CPT® Codes:      Procedure(s):  LT TOTAL HIP ARTHROPLASTY    Surgeon(s):  José Machado MD    Anesthesia: General    Staff:   Circulator: Eduard Barnhart RN; Raven Pierre RN  Scrub Person: Natalie Merida; Jayde Beach; Tanisha Cisneros  Vendor Representative: Jacqueline Rashid  Assistant: Parker Hodges MD    Estimated Blood Loss: * No values recorded between 3/9/2017 11:54 AM and 3/9/2017  1:28 PM *  Urine Voided: * No values recorded between 3/9/2017 11:54 AM and 3/9/2017  1:28 PM *    Specimens:                * No specimens in log *      Drains:   Drain/Device Site 03/09/17 1315 Left hip collapsible closed device (Active)           Findings:     Complications:       José Machado MD     Date: 3/9/2017  Time: 1:33 PM

## 2017-03-09 NOTE — ANESTHESIA PREPROCEDURE EVALUATION
Anesthesia Evaluation        Airway   Dental    (+) poor dentition    Comment: Chipped teeth upper    Pulmonary    (+) shortness of breath,   Cardiovascular     (+) CAD, CABG,       Neuro/Psych  GI/Hepatic/Renal/Endo      Musculoskeletal     Abdominal    Substance History      OB/GYN          Other   (+) arthritis                                 Anesthesia Plan    ASA 3     general     Anesthetic plan and risks discussed with patient.

## 2017-03-09 NOTE — NURSING NOTE
Lab result for Troponin is negative, <0,010, result called to Dr AMBER Reed, pt may go to ortho. Floor for recovery

## 2017-03-09 NOTE — ANESTHESIA PROCEDURE NOTES
Airway  Urgency: elective    Airway not difficult    General Information and Staff    Patient location during procedure: OR  Anesthesiologist: NICOLLE LEYVA  CRNA: REBEKAH DORMAN    Indications and Patient Condition  Indications for airway management: airway protection    Preoxygenated: yes  MILS maintained throughout  Mask difficulty assessment: 1 - vent by mask    Final Airway Details  Final airway type: endotracheal airway      Successful airway: ETT  Cuffed: yes   Successful intubation technique: direct laryngoscopy  Facilitating devices/methods: anterior pressure/BURP and intubating stylet  Endotracheal tube insertion site: oral  Blade: Grajeda  Blade size: #2  ETT size: 8.0 mm  Cormack-Lehane Classification: grade I - full view of glottis  Placement verified by: chest auscultation and capnometry   Cuff volume (mL): 8  Measured from: lips  ETT to lips (cm): 24  Number of attempts at approach: 1    Additional Comments  Pt preoxygenated, SIVI, bag mask vent, ATETI, dentition as before

## 2017-03-09 NOTE — ANESTHESIA POSTPROCEDURE EVALUATION
Patient: José Moody    Procedure Summary     Date Anesthesia Start Anesthesia Stop Room / Location    03/09/17 1155 1352  [facility] / [facility]       Procedure Diagnosis Surgeon Provider    LT TOTAL HIP ARTHROPLASTY (Left Hip) No diagnosis on file. MD John Cristobal MD          Anesthesia Type: general  Last vitals  /64 (03/09/17 1515)    Temp      Pulse 80 (03/09/17 1515)   Resp 16 (03/09/17 1515)    SpO2 99 % (03/09/17 1515)      Post Anesthesia Care and Evaluation    Patient location during evaluation: PACU  Patient participation: complete - patient participated  Level of consciousness: awake and alert  Pain management: adequate  Airway patency: patent  Anesthetic complications: No anesthetic complications    Cardiovascular status: acceptable  Respiratory status: acceptable  Hydration status: acceptable

## 2017-03-10 VITALS
HEART RATE: 89 BPM | BODY MASS INDEX: 30.17 KG/M2 | TEMPERATURE: 97 F | SYSTOLIC BLOOD PRESSURE: 122 MMHG | HEIGHT: 66 IN | WEIGHT: 187.7 LBS | DIASTOLIC BLOOD PRESSURE: 62 MMHG | RESPIRATION RATE: 16 BRPM | OXYGEN SATURATION: 98 %

## 2017-03-10 LAB
ANION GAP SERPL CALCULATED.3IONS-SCNC: 12.2 MMOL/L
BUN BLD-MCNC: 15 MG/DL (ref 8–23)
BUN/CREAT SERPL: 16.9 (ref 7–25)
CALCIUM SPEC-SCNC: 8.5 MG/DL (ref 8.6–10.5)
CHLORIDE SERPL-SCNC: 100 MMOL/L (ref 98–107)
CO2 SERPL-SCNC: 23.8 MMOL/L (ref 22–29)
CREAT BLD-MCNC: 0.89 MG/DL (ref 0.76–1.27)
GFR SERPL CREATININE-BSD FRML MDRD: 84 ML/MIN/1.73
GLUCOSE BLD-MCNC: 137 MG/DL (ref 65–99)
HCT VFR BLD AUTO: 35.1 % (ref 40.4–52.2)
HGB BLD-MCNC: 11.5 G/DL (ref 13.7–17.6)
POTASSIUM BLD-SCNC: 4.1 MMOL/L (ref 3.5–5.2)
SODIUM BLD-SCNC: 136 MMOL/L (ref 136–145)

## 2017-03-10 PROCEDURE — 25010000002 FONDAPARINUX PER 0.5 MG: Performed by: ORTHOPAEDIC SURGERY

## 2017-03-10 PROCEDURE — 85014 HEMATOCRIT: CPT | Performed by: ORTHOPAEDIC SURGERY

## 2017-03-10 PROCEDURE — 25010000002 KETOROLAC TROMETHAMINE PER 15 MG: Performed by: ORTHOPAEDIC SURGERY

## 2017-03-10 PROCEDURE — 94799 UNLISTED PULMONARY SVC/PX: CPT

## 2017-03-10 PROCEDURE — 80048 BASIC METABOLIC PNL TOTAL CA: CPT | Performed by: ORTHOPAEDIC SURGERY

## 2017-03-10 PROCEDURE — 85018 HEMOGLOBIN: CPT | Performed by: ORTHOPAEDIC SURGERY

## 2017-03-10 PROCEDURE — 97165 OT EVAL LOW COMPLEX 30 MIN: CPT | Performed by: OCCUPATIONAL THERAPIST

## 2017-03-10 PROCEDURE — 97110 THERAPEUTIC EXERCISES: CPT | Performed by: PHYSICAL THERAPIST

## 2017-03-10 PROCEDURE — 97161 PT EVAL LOW COMPLEX 20 MIN: CPT | Performed by: PHYSICAL THERAPIST

## 2017-03-10 PROCEDURE — 25010000003 CEFAZOLIN IN DEXTROSE 2-4 GM/100ML-% SOLUTION: Performed by: ORTHOPAEDIC SURGERY

## 2017-03-10 RX ORDER — FONDAPARINUX SODIUM 2.5 MG/.5ML
2.5 INJECTION SUBCUTANEOUS ONCE
Status: COMPLETED | OUTPATIENT
Start: 2017-03-10 | End: 2017-03-10

## 2017-03-10 RX ORDER — OXYCODONE HYDROCHLORIDE AND ACETAMINOPHEN 5; 325 MG/1; MG/1
1-2 TABLET ORAL EVERY 4 HOURS PRN
Qty: 100 TABLET | Refills: 0 | Status: SHIPPED | OUTPATIENT
Start: 2017-03-10 | End: 2017-03-19

## 2017-03-10 RX ADMIN — ASPIRIN 325 MG: 325 TABLET, DELAYED RELEASE ORAL at 08:04

## 2017-03-10 RX ADMIN — SODIUM CHLORIDE, POTASSIUM CHLORIDE, SODIUM LACTATE AND CALCIUM CHLORIDE 100 ML/HR: 600; 310; 30; 20 INJECTION, SOLUTION INTRAVENOUS at 02:27

## 2017-03-10 RX ADMIN — OXYCODONE HYDROCHLORIDE AND ACETAMINOPHEN 2 TABLET: 5; 325 TABLET ORAL at 11:46

## 2017-03-10 RX ADMIN — KETOROLAC TROMETHAMINE 30 MG: 30 INJECTION, SOLUTION INTRAMUSCULAR at 08:05

## 2017-03-10 RX ADMIN — FERROUS SULFATE TAB 325 MG (65 MG ELEMENTAL FE) 325 MG: 325 (65 FE) TAB at 08:05

## 2017-03-10 RX ADMIN — DOCUSATE SODIUM 100 MG: 100 CAPSULE, LIQUID FILLED ORAL at 08:04

## 2017-03-10 RX ADMIN — OXYCODONE HYDROCHLORIDE AND ACETAMINOPHEN 2 TABLET: 5; 325 TABLET ORAL at 08:04

## 2017-03-10 RX ADMIN — CEFAZOLIN SODIUM 2 G: 2 INJECTION, SOLUTION INTRAVENOUS at 02:17

## 2017-03-10 RX ADMIN — FONDAPARINUX SODIUM 2.5 MG: 2.5 INJECTION, SOLUTION SUBCUTANEOUS at 11:12

## 2017-03-10 RX ADMIN — KETOROLAC TROMETHAMINE 30 MG: 30 INJECTION, SOLUTION INTRAMUSCULAR at 02:19

## 2017-03-10 NOTE — DISCHARGE INSTR - ACTIVITY
WEIGHT BEARING AS TOLERATED TO OUR LEFT LEG WITH ASSIST OF WALKER.Posterior Hip Precautions    After surgery on your hip joint, please follow these instructions:    -Do not cross your legs at your knees or ankles.  -Do not bend more than 90 degrees at your hip  -Use a pillow between your legs when lying on your side to keep your hip from moving inward.  -Avoid low chairs.  If needed sit on pillows to make your seat higher.  -Chair seats and toilet seats should be at least 20 inches off the floor so that your hips are higher than your knees when you sit down.  Your nurse or doctor can help you get an elevated toilet seat if you need it.  -When getting up from a chair, slide to the edge of the chair.  Use your walker or crutches to support yourself to get up.

## 2017-03-10 NOTE — THERAPY DISCHARGE NOTE
Acute Care - Physical Therapy Initial Eval/Discharge  [facility]     Patient Name: José Moody  : 1945  MRN: [id number]  Today's Date: 3/10/2017                Admit Date: 3/9/2017    Visit Dx:    ICD-10-CM ICD-9-CM   1. Primary osteoarthritis of hip, unspecified laterality M16.10 715.15     Patient Active Problem List   Diagnosis   • Abnormal thallium stress test   • Atrial fibrillation   • S/P CABG (coronary artery bypass graft)   • Chest pain   • Hyperlipidemia   • Shortness of breath   • Weak pulse   • SOB (shortness of breath)   • Chronic coronary artery disease   • Degenerative joint disease (DJD) of hip     Past Medical History   Diagnosis Date   • Anxiety    • Arthritis      LT HIP   • Coronary artery disease    • High cholesterol    • History of atrial fibrillation      LAST HAD    • Limited joint range of motion      LT HIP   • PONV (postoperative nausea and vomiting)      AFTER TONSILLECTOMY     Past Surgical History   Procedure Laterality Date   • Coronary artery bypass graft       3 VESSELS    • Tonsillectomy     • Colonoscopy     • Total hip arthroplasty Left 3/9/2017     Procedure: LT TOTAL HIP ARTHROPLASTY;  Surgeon: José Machado MD;  Location: [facility] OR;  Service:           PT ASSESSMENT (last 72 hours)      PT Evaluation       03/10/17 1046 03/10/17 1031    Rehab Evaluation    Document Type  evaluation;discharge evaluation/summary  -    Subjective Information  agree to therapy;no complaints  -    Patient Effort, Rehab Treatment  excellent  -    Symptoms Noted During/After Treatment  none  -    General Information    General Observations  in chair   -    Precautions/Limitations  hip precautions- left  -    Prior Level of Function  independent:  -    Equipment Currently Used at Home cane, straight  -VA cane, straight  -KH    Plans/Goals Discussed With  patient and family  -    Living Environment    Lives With child(alvin), adult  -VA     Living  Arrangements house  -VA     Transportation Available car;family or friend will provide  -VA     Clinical Impression    Patient/Family Goals Statement  return home  -    Pain Assessment    Pain Assessment  0-10  -    Pain Score  3  -KH    Pain Location  Hip  -    Pain Orientation  Left  -    Cognitive Assessment/Intervention    Current Cognitive/Communication Assessment  functional  -    Orientation Status  oriented x 4  -KH    Follows Commands/Answers Questions  100% of the time  -    Personal Safety  WNL/WFL  -    Personal Safety Interventions  fall prevention program maintained  -    ROM (Range of Motion)    General ROM Detail  WFL , except L hip  -    MMT (Manual Muscle Testing)    General MMT Assessment Detail  WFL  -    Bed Mobility, Assessment/Treatment    Bed Mob, Supine to Sit, [place]  not tested  -    Bed Mob, Sit to Supine, [place]  not tested  -    Bed Mobility, Comment  up in chair  -    Transfer Assessment/Treatment    Transfers, Sit-Stand [place]  contact guard assist  -    Transfers, Stand-Sit [place]  contact guard assist  -    Transfers, Sit-Stand-Sit, Assist Device  rolling walker  -    Gait Assessment/Treatment    Gait, [place] Level  contact guard assist  -    Gait, Assistive Device  rolling walker  -    Gait, Distance (Feet)  125  -    Stairs Assessment/Treatment    Number of Stairs  2  -KH    Stairs, Handrail Location  none  -    Stairs, [place] Level  contact guard assist  -    Therapy Exercises    Exercise Protocols  total hip  -    Total Hip Exercises  left:;10 reps;completed protocol  -    Positioning and Restraints    Pre-Treatment Position  sitting in chair/recliner  -    Post Treatment Position  chair  -KH    In Chair  reclined;call light within reach;encouraged to call for assist;with family/caregiver;notified sondra  -YORDY      03/10/17 0945 03/10/17 0815    Rehab Evaluation    Document Type evaluation;discharge  summary  -     Subjective Information no complaints;agree to therapy  -     Patient Effort, Rehab Treatment excellent  -     Symptoms Noted During/After Treatment none  -     General Information    General Observations pt supine in bed. family present  -     Precautions/Limitations hip precautions- left  -     Prior Level of Function independent:;ADL's  -     Plans/Goals Discussed With patient and family;agreed upon  -     Pain Assessment    Pain Assessment No/denies pain  -     Vision Assessment/Intervention    Visual Impairment WFL  -     Cognitive Assessment/Intervention    Current Cognitive/Communication Assessment functional  -     Orientation Status oriented x 4  -     Follows Commands/Answers Questions 100% of the time;able to follow single-step instructions  -     Personal Safety WNL/WFL  -     Personal Safety Interventions fall prevention program maintained;gait belt;nonskid shoes/slippers when out of bed  -     Short/Long Term Memory intact short term memory;intact long term memory  -     Muscle Tone Assessment    Muscle Tone Assessment  Bilateral Lower Extremities  -KS    Bilateral Lower Extremities Muscle Tone Assessment  associated movements noted   Plantarflexion/dorsiflexion strong bilaterally  -KS    Bed Mobility, Assessment/Treatment    Bed Mob, Supine to Sit, [place] set up required;supervision required;verbal cues required  -     Bed Mob, Sit to Supine, [place] set up required;supervision required;verbal cues required  -     Transfer Assessment/Treatment    Transfers, Sit-Stand [place] set up required;stand by assist  -     Transfers, Stand-Sit [place] stand by assist  -     Transfers, Sit-Stand-Sit, Assist Device rolling walker  -     Toilet Transfer, Sellers supervision required;set up required  -     Toilet Transfer, Assistive Device rolling walker  -     Positioning and Restraints    Pre-Treatment Position in bed  -      Post Treatment Position bed  -KP     In Bed side lying right;call light within reach;encouraged to call for assist;with family/caregiver;with nsg  -KP       03/10/17 0400 03/10/17 0018    Muscle Tone Assessment    Muscle Tone Assessment Bilateral Lower Extremities  -TW Bilateral Lower Extremities  -TW    Bilateral Lower Extremities Muscle Tone Assessment associated movements noted   Plantarflexion/dorsiflexion strong bilaterally  -TW associated movements noted   Plantarflexion/dorsiflexion strong bilaterally  -TW      03/09/17 2300 03/1945    Living Environment    Lives With child(alvin), adult  -NL     Living Arrangements house  -NL     Home Accessibility no concerns  -NL     Stair Railings at Home none  -NL     Type of Financial/Environmental Concern none  -NL     Transportation Available car;family or friend will provide  -NL     Living Environment Comment none  -NL     Muscle Tone Assessment    Muscle Tone Assessment  Bilateral Lower Extremities  -TW    Bilateral Lower Extremities Muscle Tone Assessment  associated movements noted   Plantarflexion/dorsiflexion strong bilaterally  -TW      03/09/17 0939 03/09/17 0927    General Information    Equipment Currently Used at Home cane, straight  -TWA cane, straight  -TWA    Living Environment    Lives With  child(alvin), adult  -TWA    Living Arrangements  house  -TWA    Home Accessibility  no concerns;stairs to enter home;bed and bath on same level  -TWA    Number of Stairs to Enter Home  2  -TWA    Transportation Available  car  -TWA      User Key  (r) = Recorded By, (t) = Taken By, (c) = Cosigned By    Initials Name Provider Type    YORDY Rivera, ROXANNE Physical Therapist    SARAHY Pierson, OTR Occupational Therapist    PREMA Crawford, NANI Case Manager    NL Acacia Reynolds, NANI Registered Nurse    MARTY Quiroga, RN Registered Nurse    JESSICA Osborne, RN Registered Nurse    ZOEY Reed, RN Registered Nurse          Physical  Therapy Education     Title: PT OT SLP Therapies (Resolved)     Topic: Physical Therapy (Resolved)     Point: Mobility training (Resolved)    Learning Progress Summary    Learner Readiness Method Response Comment Documented by Status   Patient Acceptance E [facility] 03/10/17 1034 Done               Point: Home exercise program (Resolved)    Learning Progress Summary    Learner Readiness Method Response Comment Documented by Status   Patient Acceptance E [facility] 03/10/17 1034 Done               Point: Precautions (Resolved)    Learning Progress Summary    Learner Readiness Method Response Comment Documented by Status   Patient Acceptance E [facility] 03/10/17 1034 Done                      User Key     Initials Effective Dates Name Provider Type Discipline     05/18/15 -  Che Rivera, PT Physical Therapist PT                PT Recommendation and Plan  Anticipated Equipment Needs At Discharge: bedside commode  Anticipated Discharge Disposition: home with assist  PT Frequency: evaluation only  Plan of Care Review  Outcome Summary/Follow up Plan: Pt progressing well and is safe to d/c home later today. Educated pt and family on HEP, post-op precautions and  home safety. Ordered BSC. PT will sign off.              Outcome Measures       03/10/17 1046 03/10/17 0951       How much help from another person do you currently need...    Turning from your back to your side while in flat bed without using bedrails? 4  -KH      Moving from lying on back to sitting on the side of a flat bed without bedrails? 4  -KH      Moving to and from a bed to a chair (including a wheelchair)? 4  -KH      Standing up from a chair using your arms (e.g., wheelchair, bedside chair)? 3  -KH      Climbing 3-5 steps with a railing? 3  -KH      To walk in hospital room? 3  -KH      AM-PAC 6 Clicks Score 21  -KH      How much help from another is currently needed...    Putting on and taking off regular lower body clothing?  3  -KP     Bathing  (including washing, rinsing, and drying)  3  -KP     Toileting (which includes using toilet bed pan or urinal)  3  -KP     Putting on and taking off regular upper body clothing  4  -KP     Taking care of personal grooming (such as brushing teeth)  4  -KP     Eating meals  4  -KP     Score  21  -KP     Functional Assessment    Outcome Measure Options AM-PAC 6 Clicks Basic Mobility (PT)  -KH AM-PAC 6 Clicks Daily Activity (OT)  -KP       User Key  (r) = Recorded By, (t) = Taken By, (c) = Cosigned By    Initials Name Provider Type    YORDY Rivera, PT Physical Therapist    KP Natalie Pierson, OTR Occupational Therapist           Time Calculation:         PT Charges       03/10/17 1046          Time Calculation    Start Time 1030  -KH      Stop Time 1045  -KH      Time Calculation (min) 15 min  -        User Key  (r) = Recorded By, (t) = Taken By, (c) = Cosigned By    Initials Name Provider Type    YORDY Rivera, PT Physical Therapist          Therapy Charges for Today     Code Description Service Date Service Provider Modifiers Qty    22540202272 HC PT EVAL LOW COMPLEXITY 1 3/10/2017 Che Rivera, PT GP 1    09491828964 HC PT THER PROC EA 15 MIN 3/10/2017 Che Rivera, PT GP 1          PT G-Codes  Outcome Measure Options: AM-PAC 6 Clicks Basic Mobility (PT)    PT Discharge Summary  Anticipated Discharge Disposition: home with assist  Reason for Discharge: Discharge from facility  Discharge Destination: Home with assist    Che Rivera, PT  3/10/2017

## 2017-03-10 NOTE — PROGRESS NOTES
Discharge Planning Assessment  [facility]     Patient Name: José Moody  MRN: [id number]  Today's Date: 3/10/2017    Admit Date: 3/9/2017          Discharge Needs Assessment       03/10/17 1046    Living Environment    Lives With child(alvin), adult    Living Arrangements house    Quality Of Family Relationships supportive;helpful;involved    Able to Return to Prior Living Arrangements yes    Discharge Needs Assessment    Concerns To Be Addressed --   HH    Readmission Within The Last 30 Days no previous admission in last 30 days    Equipment Currently Used at Home cane, straight    Transportation Available car;family or friend will provide            Discharge Plan       03/10/17 1047    Case Management/Social Work Plan    Additional Comments Met w/ pt and family at the bedside, verified facesheet and explained CCP role. Pt lives w/ his adult children, he would like to d/c home w/ help of family and [facility]. [facility] notified and can accept.       03/10/17 1045    Case Management/Social Work Plan    Plan home w/ [facility].     Patient/Family In Agreement With Plan yes        Discharge Placement     Facility/Agency Request Status Selected? Address Phone Number Fax Number    [facility] Accepted    Yes [address] [facility] [place] [address] [phone]        Yanely Crawford RN 3/10/2017 10:47    3/10/2017  [facility] NOTIFIED.                   Expected Discharge Date and Time     Expected Discharge Date Expected Discharge Time    Mar 10, 2017               Demographic Summary     None            Functional Status       03/10/17 1046    Functional Status Current    Ambulation 3-->assistive equipment and person    Transferring 3-->assistive equipment and person    Toileting 3-->assistive equipment and person    Bathing 2-->assistive person    Dressing 2-->assistive person    Eating 0-->independent    Communication 0-->understands/communicates without difficulty    Swallowing  (if score 2 or more for any item, consult Rehab Services) 0-->swallows foods/liquids without difficulty    Change in Functional Status Since Onset of Current Illness/Injury yes    Functional Status Prior    Ambulation 0-->independent    Transferring 0-->independent    Toileting 0-->independent    Bathing 0-->independent    Dressing 0-->independent    Eating 0-->independent    Communication 0-->understands/communicates without difficulty    Swallowing 0-->swallows foods/liquids without difficulty    IADL    Medications independent    Meal Preparation independent    Housekeeping independent    Laundry independent    Shopping independent    Oral Care independent    Activity Tolerance    Usual Activity Tolerance good    Current Activity Tolerance fair    Cognitive/Perceptual/Developmental    Current Mental Status/Cognitive Functioning no deficits noted    Recent Changes in Mental Status/Cognitive Functioning no changes            Psychosocial     None            Abuse/Neglect     None            Legal     None            Substance Abuse     None            Patient Forms       03/10/17 1046    Patient Forms    Provider Choice List Delivered    Delivered to Patient    Method of delivery In person          Yanely Crawford RN

## 2017-03-10 NOTE — PLAN OF CARE
Problem: Patient Care Overview (Adult)  Goal: Plan of Care Review  Outcome: Ongoing (interventions implemented as appropriate)    03/10/17 0804   Coping/Psychosocial Response Interventions   Plan Of Care Reviewed With patient   Patient Care Overview   Progress improving   Outcome Evaluation   Outcome Summary/Follow up Plan Pain controlled/Increased distance w/ambulation/Increased AROM w/o pain       Goal: Adult Individualization and Mutuality  Outcome: Ongoing (interventions implemented as appropriate)    Problem: Hip Replacement, Total (Adult)  Goal: Signs and Symptoms of Listed Potential Problems Will be Absent or Manageable (Hip Replacement, Total)  Outcome: Ongoing (interventions implemented as appropriate)    Problem: Fall Risk (Adult)  Goal: Identify Related Risk Factors and Signs and Symptoms  Outcome: Ongoing (interventions implemented as appropriate)  Goal: Absence of Falls  Outcome: Outcome(s) achieved Date Met:  03/10/17

## 2017-03-10 NOTE — DISCHARGE SUMMARY
HISTORY:  This is a 71-year-old male with end-stage primary osteoarthritis of his left hip. He was admitted through the operating room for a left total hip replacement.     HOSPITAL COURSE: The day after surgery, vital signs stable. Drain was removed. Dressing changed. Therapy was begun. Postoperative x-ray looked good. Hemoglobin was 11.5, glucose 137, BUN 15, creatinine 0.89, potassium 4.1. I gave him Arixtra 2.5 mg subcutaneous the morning after surgery. We have him on aspirin thereafter. We are discharging him after therapy today.     DISCHARGE DIAGNOSES:  1.   End-stage primary osteoarthritis of the left hip. He has undergone left total hip placement this admission.   2.   Coronary artery disease. He had bypass surgery in 2014.     PLAN:  1.   He is on aspirin 325 mg daily for 6 weeks for DVT prophylaxis.   2.   He is on Percocet 5/325 for pain.   3.   Staples out at 14 days.   4.   Shower day 7 if wound is clean and dry.   5.   Therapy, total hip placement protocol, weightbear as tolerated.   6.   Return to office in 3 weeks postop, sooner for problems.           José Machado M.D.  DINH:clark  D:   03/10/2017 07:55:12  T:   03/10/2017 09:23:46  Job ID:   [id number]  Document ID:   [id number]  cc:

## 2017-03-10 NOTE — PLAN OF CARE
Problem: Patient Care Overview (Adult)  Goal: Plan of Care Review  Outcome: Ongoing (interventions implemented as appropriate)    03/09/17 0939   Coping/Psychosocial Response Interventions   Plan Of Care Reviewed With patient   Patient Care Overview   Progress no change       Goal: Adult Individualization and Mutuality  Outcome: Ongoing (interventions implemented as appropriate)  Goal: Discharge Needs Assessment  Outcome: Ongoing (interventions implemented as appropriate)    Problem: Perioperative Period (Adult)  Goal: Signs and Symptoms of Listed Potential Problems Will be Absent or Manageable (Perioperative Period)  Outcome: Ongoing (interventions implemented as appropriate)    Problem: Hip Replacement, Total (Adult)  Goal: Signs and Symptoms of Listed Potential Problems Will be Absent or Manageable (Hip Replacement, Total)  Outcome: Ongoing (interventions implemented as appropriate)    Problem: Fall Risk (Adult)  Goal: Identify Related Risk Factors and Signs and Symptoms  Outcome: Ongoing (interventions implemented as appropriate)  Goal: Absence of Falls  Outcome: Ongoing (interventions implemented as appropriate)

## 2017-03-10 NOTE — DISCHARGE INSTR - DIET
General Information: The length of hospital stay after knee and hip replacement surgery has, over the last several years,  decreased significantly. Reasons for this include concerns regarding costs and attempts to keep those costs down. However, as surgeons have improved their surgical techniques, as those techniques have become less invasive, and as pain management has improved with the Muslim of longer acting blocks that do not interfere with early Physical Therapy, safe early hospital discharge has become common place.    Discharge Home vs Rehab: Though it may seem intuitive that being discharged from the hospital to a rehab facility would be advantageous due to the seeming greater availability of physical therapy, the reverse is actually true. When patients return to the office at the 3-week post op jesus, those patients who have been discharged to their home environment consistently out preform those patients who spent time in an inpatient rehab facility. There are several reasons for this. First, home health nursing and physical therapy services have over the years become cutting edge. Second, when placed in the home environment, patients inevitably will do more for themselves in terms of self-care and mobilization. The ‘getting up and doing for oneself’ is in and of itself, physical therapy. Even the task of managing stairs at home is a form of therapy. Those patients who go home after a brief hospital stay typically are walking better, have more of a spring in their step, are functioning more independently, and are closer to getting back to a normal lifestyle than those who have spent time in an inpatient rehab facility. In addition, my mantra is this: if you don’t want to get sick, stay away from places where sick people are. Going home is the safest and best option after hospital discharge for any patient who is not totally alone and can arrange for any kind of help at home (this is another reason, by  the way ,to try to get out of the hospital sooner rather than later).    Specific Post Op Instructions:  Blood Thinners: All patients will be on some type of blood thinner post op to prevent blood clots. Often a blood thinning shot is used while in the hospital. Most patients who are not high risk are discharged on aspirin (either 325 mg or 81 mg depending on age and size). High risk patients may be discharged on a more potent oral or injectable form of a blood thinner. Keep in mind that the more aggressive the blood thinner used, the greater the risk of bleeding complications post op. This is always a balancing act.  Ice: Early post op until the swelling diminishes, ice should be applied to the knee for 30 minutes out of every 2 hours while awake.  Staples: Staples are taken out at 14 days post op. Usually the nurse at home will perform this task.  Dressing Changes: If the wound is clean and dry with no redness or drainage, the dressings can be discontinued on the 3rd or 4th day post op.  Showering: The edges of the wound take 3 days after surgery to seal. Given the magnitude of hip and knee replacement surgery, I always like to build in a safety margin in  terms of getting the wound wet. Thus waiting till the 5th -7th day post op is recommended before showering. Do not soak the wound in water till the staples are out and the staple puncture wounds have healed. If there is any redness or drainage, the wound should be kept dry and covered by a sterile dressing until this resolves.  Weight Bearing/Ambulatory Aids: Most hip and knee replacments are implanted such that full immediate weight bearing is allowed. The walker and cane can be discarded when tolerated. There are a few special circumstances where 6 weeks of protected weight bearing may be required.  Follow-up Appointment: The first follow-up office appointment is at the 3-week jesus post op. You need to call to set up this appointment. We want to see you back  sooner if there are any problems.  Physical Therapy: Generally about 6 weeks of therapy is required after hip and knee replacement surgery. Hips usually require less PT than do knees. Usually the first 3 weeks or so of PT is done at home. Knees especially often need to follow that up with 3 weeks of outpatient therapy.  Driving a Car: With left leg surgery, driving is permitted when the patient is off of pain medication and feels sufficiently alert and strong enough to physically handle a car safely. Due to liability issues, it is generally recommended to not drive until 6 weeks post op after right leg surgery.  Returning to Daily and Recreational Activities: For the most part, patients can progress to daily activities as tolerated. Initially, it is best not to “overdo it” in an attempt to minimize early post op swelling. Once the swelling is controlled, the patient may progress as tolerated. It usually is 6 weeks before patients feel up to most all daily activities, and 3 months before feeling up to more vigorous physical activities. A golfer might start back playing at about 6 weeks. A  would probably not feel up to resuming playing until 3 months.  Return to Work: My basic premise is this: I am not the work police. I try to implant both knee and hip replacements such that it is safe to perform any activity that the patient can tolerate and then let the patient decide. The average time until returning to a sedentary job is 6 weeks. The average time until returning to a physical job is 3 months. There is great variability. The return to work date depends on many factors. It often depends in part on the patient’s perceived need to work, flexibility of demands in the work place environment, and other social and physical factors. Suffice to say that we will provide you with a note to return to work whenever you think you can manage whatever it is you will face on returning to work.

## 2017-03-10 NOTE — THERAPY DISCHARGE NOTE
Acute Care - Occupational Therapy Initial Eval/Discharge  [facility]     Patient Name: José Moody  : 1945  MRN: [id number]  Today's Date: 3/10/2017               Admit Date: 3/9/2017       ICD-10-CM ICD-9-CM   1. Primary osteoarthritis of hip, unspecified laterality M16.10 715.15     Patient Active Problem List   Diagnosis   • Abnormal thallium stress test   • Atrial fibrillation   • S/P CABG (coronary artery bypass graft)   • Chest pain   • Hyperlipidemia   • Shortness of breath   • Weak pulse   • SOB (shortness of breath)   • Chronic coronary artery disease   • Degenerative joint disease (DJD) of hip     Past Medical History   Diagnosis Date   • Anxiety    • Arthritis      LT HIP   • Coronary artery disease    • High cholesterol    • History of atrial fibrillation      LAST HAD    • Limited joint range of motion      LT HIP   • PONV (postoperative nausea and vomiting)      AFTER TONSILLECTOMY     Past Surgical History   Procedure Laterality Date   • Coronary artery bypass graft       3 VESSELS    • Tonsillectomy     • Colonoscopy            OT ASSESSMENT FLOWSHEET (last 72 hours)      OT Evaluation       03/10/17 0945 03/10/17 0815 03/10/17 0400 03/10/17 0018 17 2300    Rehab Evaluation    Document Type evaluation;discharge summary  -KP        Subjective Information no complaints;agree to therapy  -KP        Patient Effort, Rehab Treatment excellent  -KP        Symptoms Noted During/After Treatment none  -KP        General Information    General Observations pt supine in bed. family present  -        Precautions/Limitations hip precautions- left  -KP        Prior Level of Function independent:;ADL's  -        Plans/Goals Discussed With patient and family;agreed upon  -        Living Environment    Lives With     child(alvin), adult  -NL    Living Arrangements     house  -NL    Home Accessibility     no concerns  -NL    Stair Railings at Home     none  -NL    Type of  Financial/Environmental Concern     none  -NL    Transportation Available     car;family or friend will provide  -NL    Living Environment Comment     none  -NL    Clinical Impression    Therapy Frequency evaluation only  -        Functional Level Prior    Prior Functional Level Comment     none  -NL    Pain Assessment    Pain Assessment No/denies pain  -        Vision Assessment/Intervention    Visual Impairment WFL  -        Cognitive Assessment/Intervention    Current Cognitive/Communication Assessment functional  -        Orientation Status oriented x 4  -        Follows Commands/Answers Questions 100% of the time;able to follow single-step instructions  -        Personal Safety WNL/WFL  -        Personal Safety Interventions fall prevention program maintained;gait belt;nonskid shoes/slippers when out of bed  -        Short/Long Term Memory intact short term memory;intact long term memory  -        Muscle Tone Assessment    Muscle Tone Assessment  Bilateral Lower Extremities  -KS Bilateral Lower Extremities  -TW Bilateral Lower Extremities  -TW     Bilateral Lower Extremities Muscle Tone Assessment  associated movements noted   Plantarflexion/dorsiflexion strong bilaterally  -KS associated movements noted   Plantarflexion/dorsiflexion strong bilaterally  -TW associated movements noted   Plantarflexion/dorsiflexion strong bilaterally  -TW     Bed Mobility, Assessment/Treatment    Bed Mob, Supine to Sit, Woody set up required;supervision required;verbal cues required  -        Bed Mob, Sit to Supine, Woody set up required;supervision required;verbal cues required  -        Transfer Assessment/Treatment    Transfers, Sit-Stand Woody set up required;stand by assist  -        Transfers, Stand-Sit Woody stand by assist  -        Transfers, Sit-Stand-Sit, Assist Device rolling walker  -        Toilet Transfer, Woody supervision required;set up required  -         Toilet Transfer, Assistive Device rolling walker  -        Functional Mobility    Functional Mobility- Ind. Level set up required;supervision required  -        Functional Mobility- Device rolling walker  -        Functional Mobility-Distance (Feet) 20  -        Upper Body Bathing Assessment/Training    UB Bathing Assess/Train, Comment ed pt on bathing and dressing technique w/ precautions. pts wife to A at home.  -        Toileting Assessment/Training    Toileting Assess/Train, Assistive Device raised toilet seat  -        Toileting Assess/Train, Position standing;sitting  -        Toileting Assess/Train, Indepen Level set up required;supervision required  -        Positioning and Restraints    Pre-Treatment Position in bed  -        Post Treatment Position bed  -KP        In Bed side lying right;call light within reach;encouraged to call for assist;with family/caregiver;with nsg  -          03/1945 03/09/17 1455 03/09/17 0939 03/09/17 0927       General Information    Equipment Currently Used at Home   cane, straight  -TWA cane, straight  -TWA     Living Environment    Lives With    child(alvin), adult  -TWA     Living Arrangements    house  -TWA     Home Accessibility    no concerns;stairs to enter home;bed and bath on same level  -TWA     Number of Stairs to Enter Home    2  -TWA     Transportation Available    car  -TWA     Functional Level Prior    Ambulation  0-->independent  -WM  1-->assistive equipment  -TWA     Transferring  0-->independent  -WM  1-->assistive equipment  -TWA     Toileting  0-->independent  -WM  1-->assistive equipment  -TWA     Bathing  0-->independent  -WM  0-->independent  -TWA     Dressing  0-->independent  -WM  0-->independent  -TWA     Eating  0-->independent  -WM  0-->independent  -TWA     Communication  0-->understands/communicates without difficulty  -WM  0-->understands/communicates without difficulty  -TWA     Swallowing  0-->swallows foods/liquids  without difficulty  -WM  0-->swallows foods/liquids without difficulty  -TWA     Muscle Tone Assessment    Muscle Tone Assessment Bilateral Lower Extremities  -TW        Bilateral Lower Extremities Muscle Tone Assessment associated movements noted   Plantarflexion/dorsiflexion strong bilaterally  -TW          User Key  (r) = Recorded By, (t) = Taken By, (c) = Cosigned By    Initials Name Effective Dates     Natalie Sinharis, OTR 04/13/15 -     NL Acacia Reynolds, NANI 06/16/16 -     WM Charlie Funez, RN 06/16/16 -     KS Debra Quiroga, NANI 06/16/16 -     TWA Kerry Osborne, NANI 06/16/16 -     TW Kerry Reed, NANI 02/06/17 -           Occupational Therapy Education     Title: PT OT SLP Therapies (Resolved)     Topic: Occupational Therapy (Resolved)     Point: ADL training (Resolved)    Description: Instruct learner(s) on proper safety adaptation and remediation techniques during self care or transfers.   Instruct in proper use of assistive devices.    Learning Progress Summary    Learner Readiness Method Response Comment Documented by Status   Patient Acceptance E,D,TY MARTINEZ,TERRANCE ed pt and family on role of OT, total hip precautions, tsf techniques. Pt and family verbally understand and pt demo tsf to toilet and back to bed w.precautions followed. no further OT needs at this time. [facility] wife will A. w.LBD and bathing  03/10/17 0950 Done   Family Acceptance E,D,TB JUAN,TERRANCE ed pt and family on role of OT, total hip precautions, tsf techniques. Pt and family verbally understand and pt demo tsf to toilet and back to bed w.precautions followed. no further OT needs at this time. [facility] wife will A. w.LBD and bathing  03/10/17 0950 Done               Point: Precautions (Resolved)    Description: Instruct learner(s) on prescribed precautions during self-care and functional transfers.    Learning Progress Summary    Learner Readiness Method Response Comment Documented by Status   Patient Acceptance E,D,TY MARTINEZ,TERRANCE ed pt and  family on role of OT, total hip precautions, tsf techniques. Pt and family verbally understand and pt demo tsf to toilet and back to bed w.precautions followed. no further OT needs at this time. [facility] wife will A. w.LBD and bathing  03/10/17 0950 Done   Family Acceptance E,D,TY MARTINEZ,TERRANCE ed pt and family on role of OT, total hip precautions, tsf techniques. Pt and family verbally understand and pt demo tsf to toilet and back to bed w.precautions followed. no further OT needs at this time. [facility] wife will A. w.LBD and bathing  03/10/17 0950 Done               Point: Body mechanics (Resolved)    Description: Instruct learner(s) on proper positioning and spine alignment during self-care, functional mobility activities and/or exercises.    Learning Progress Summary    Learner Readiness Method Response Comment Documented by Status   Patient Acceptance E,D,TY MARTINEZ,TERRANCE ed pt and family on role of OT, total hip precautions, tsf techniques. Pt and family verbally understand and pt demo tsf to toilet and back to bed w.precautions followed. no further OT needs at this time. [facility] wife will A. w.LBD and bathing  03/10/17 0950 Done   Family Acceptance E,D,TY MARTINEZ,TERRANCE ed pt and family on role of OT, total hip precautions, tsf techniques. Pt and family verbally understand and pt demo tsf to toilet and back to bed w.precautions followed. no further OT needs at this time. [facility] wife will A. w.LBD and bathing  03/10/17 0950 Done                      User Key     Initials Effective Dates Name Provider Type Discipline     04/13/15 -  Natalie Pierson OTR Occupational Therapist OT                OT Recommendation and Plan  Therapy Frequency: evaluation only                  Outcome Measures       03/10/17 0951          How much help from another is currently needed...    Putting on and taking off regular lower body clothing? 3  -KP      Bathing (including washing, rinsing, and drying) 3  -KP      Toileting (which includes  using toilet bed pan or urinal) 3  -KP      Putting on and taking off regular upper body clothing 4  -KP      Taking care of personal grooming (such as brushing teeth) 4  -KP      Eating meals 4  -      Score 21  -KP      Functional Assessment    Outcome Measure Options AM-PAC 6 Clicks Daily Activity (OT)  -        User Key  (r) = Recorded By, (t) = Taken By, (c) = Cosigned By    Initials Name Provider Type    KOMAL Lozano Occupational Therapist          Time Calculation:         Time Calculation- OT       03/10/17 0951          Time Calculation- OT    OT Start Time 0830  -      OT Stop Time 0838  -      OT Time Calculation (min) 8 min  -      OT Received On 03/10/17  -        User Key  (r) = Recorded By, (t) = Taken By, (c) = Cosigned By    Initials Name Provider Type    KOMAL Lozano Occupational Therapist          Therapy Charges for Today     Code Description Service Date Service Provider Modifiers Qty    [id number]  OT EVAL LOW COMPLEXITY 2 3/10/2017 KOMAL Austin GO 1                    KOMAL Austin  3/10/2017

## 2017-03-10 NOTE — PROGRESS NOTES
"Visit Vitals   • /70 (BP Location: Right arm, Patient Position: Lying)   • Pulse 88   • Temp 97 °F (36.1 °C) (Oral)   • Resp 16   • Ht 66\" (167.6 cm)   • Wt 187 lb 11.2 oz (85.1 kg)   • SpO2 97%   • BMI 30.3 kg/m2       Lab Results (last 24 hours)     Procedure Component Value Units Date/Time    Troponin [[id number]]  (Normal) Collected:  03/09/17 1422    Specimen:  Blood Updated:  03/09/17 1505     Troponin T <0.010 ng/mL     Narrative:       Troponin T Reference Ranges:  Less than 0.03 ng/mL:    Negative for AMI  0.03 to 0.09 ng/mL:      Indeterminant for AMI  Greater than 0.09 ng/mL: Positive for AMI    Hemoglobin & Hematocrit, Blood [[id number]]  (Abnormal) Collected:  03/10/17 0614    Specimen:  Blood Updated:  03/10/17 0634     Hemoglobin 11.5 (L) g/dL      Hematocrit 35.1 (L) %     Basic Metabolic Panel [[id number]]  (Abnormal) Collected:  03/10/17 0614    Specimen:  Blood Updated:  03/10/17 0700     Glucose 137 (H) mg/dL      BUN 15 mg/dL      Creatinine 0.89 mg/dL      Sodium 136 mmol/L      Potassium 4.1 mmol/L      Chloride 100 mmol/L      CO2 23.8 mmol/L      Calcium 8.5 (L) mg/dL      eGFR Non African Amer 84 mL/min/1.73      BUN/Creatinine Ratio 16.9      Anion Gap 12.2 mmol/L     Narrative:       The MDRD GFR formula is only valid for adults with stable renal function between ages 18 and 70.          Imaging Results (last 24 hours)     Procedure Component Value Units Date/Time    XR Hip 1 View Without Pelvis Left (Surgery Only) [[id number]] Collected:  03/09/17 1409     Updated:  03/09/17 1413    Narrative:       PELVIS LEFT HIP     HISTORY: Postop arthroplasty     FINDINGS:Portable AP view of the pelvis and left hip demonstrate left  total hip arthroplasty. Prosthetic components appear to be in  satisfactory position. There is no periprosthetic fracture. Anticipated  postoperative changes are present within the soft tissues.     This report was finalized on 3/9/2017 2:10 PM by Dr. Harris Mercer MD.    "          Patient Care Team:  Edmundo Reza MD as PCP - General (Family Medicine)  Tanisha Mosqueda MD (Cardiology)    SUBJECTIVE  comfortable  PHYSICAL EXAM   nv intact  Active Problems:    Degenerative joint disease (DJD) of hip      PLAN / DISPOSITION:  arixtra one time dose  HH discharge today  José Machado MD  03/10/17  7:45 AM

## 2017-03-10 NOTE — PLAN OF CARE
Problem: Patient Care Overview (Adult)  Goal: Plan of Care Review    03/10/17 1034   Outcome Evaluation   Outcome Summary/Follow up Plan Pt progressing well and is safe to d/c home later today. Educated pt and family on HEP, post-op precautions and home safety. Ordered BSC. PT will sign off.

## 2017-06-19 ENCOUNTER — OFFICE VISIT (OUTPATIENT)
Dept: CARDIOLOGY | Facility: CLINIC | Age: 72
End: 2017-06-19

## 2017-06-19 VITALS
SYSTOLIC BLOOD PRESSURE: 155 MMHG | HEART RATE: 102 BPM | BODY MASS INDEX: 31.18 KG/M2 | DIASTOLIC BLOOD PRESSURE: 93 MMHG | WEIGHT: 194 LBS | HEIGHT: 66 IN

## 2017-06-19 DIAGNOSIS — E78.5 HYPERLIPIDEMIA, UNSPECIFIED HYPERLIPIDEMIA TYPE: ICD-10-CM

## 2017-06-19 DIAGNOSIS — R00.2 PALPITATIONS: ICD-10-CM

## 2017-06-19 DIAGNOSIS — I48.91 ATRIAL FIBRILLATION, UNSPECIFIED TYPE (HCC): Primary | ICD-10-CM

## 2017-06-19 DIAGNOSIS — Z95.1 S/P CABG (CORONARY ARTERY BYPASS GRAFT): ICD-10-CM

## 2017-06-19 DIAGNOSIS — I25.10 CHRONIC CORONARY ARTERY DISEASE: ICD-10-CM

## 2017-06-19 PROCEDURE — 93000 ELECTROCARDIOGRAM COMPLETE: CPT | Performed by: INTERNAL MEDICINE

## 2017-06-19 PROCEDURE — 99213 OFFICE O/P EST LOW 20 MIN: CPT | Performed by: INTERNAL MEDICINE

## 2017-06-19 RX ORDER — DULOXETIN HYDROCHLORIDE 30 MG/1
60 CAPSULE, DELAYED RELEASE ORAL DAILY
COMMUNITY
End: 2018-09-17 | Stop reason: DRUGHIGH

## 2017-06-19 RX ORDER — BISOPROLOL FUMARATE AND HYDROCHLOROTHIAZIDE 5; 6.25 MG/1; MG/1
TABLET ORAL
Qty: 90 TABLET | Refills: 1 | Status: SHIPPED | OUTPATIENT
Start: 2017-06-19 | End: 2017-06-28 | Stop reason: SDUPTHER

## 2017-06-19 RX ORDER — BISOPROLOL FUMARATE AND HYDROCHLOROTHIAZIDE 5; 6.25 MG/1; MG/1
1 TABLET ORAL DAILY
Qty: 30 TABLET | Refills: 6 | Status: SHIPPED | OUTPATIENT
Start: 2017-06-19 | End: 2017-06-19 | Stop reason: SDUPTHER

## 2017-06-28 RX ORDER — BISOPROLOL FUMARATE AND HYDROCHLOROTHIAZIDE 5; 6.25 MG/1; MG/1
1 TABLET ORAL DAILY
Qty: 90 TABLET | Refills: 1 | Status: SHIPPED | OUTPATIENT
Start: 2017-06-28 | End: 2017-07-13 | Stop reason: HOSPADM

## 2017-07-07 ENCOUNTER — HOSPITAL ENCOUNTER (INPATIENT)
Facility: HOSPITAL | Age: 72
LOS: 6 days | Discharge: HOME | End: 2017-07-13
Attending: EMERGENCY MEDICINE | Admitting: INTERNAL MEDICINE

## 2017-07-07 ENCOUNTER — APPOINTMENT (OUTPATIENT)
Dept: GENERAL RADIOLOGY | Facility: HOSPITAL | Age: 72
End: 2017-07-07

## 2017-07-07 ENCOUNTER — APPOINTMENT (OUTPATIENT)
Dept: CT IMAGING | Facility: HOSPITAL | Age: 72
End: 2017-07-07

## 2017-07-07 DIAGNOSIS — R91.8 BILATERAL PULMONARY INFILTRATES ON CHEST X-RAY: Primary | ICD-10-CM

## 2017-07-07 DIAGNOSIS — R06.02 SHORTNESS OF BREATH: ICD-10-CM

## 2017-07-07 LAB
ALBUMIN SERPL-MCNC: 3.9 G/DL (ref 3.5–5.2)
ALBUMIN/GLOB SERPL: 1 G/DL
ALP SERPL-CCNC: 119 U/L (ref 39–117)
ALT SERPL W P-5'-P-CCNC: 16 U/L (ref 1–41)
ANION GAP SERPL CALCULATED.3IONS-SCNC: 14.4 MMOL/L
AST SERPL-CCNC: 19 U/L (ref 1–40)
BASOPHILS # BLD AUTO: 0.05 10*3/MM3 (ref 0–0.2)
BASOPHILS NFR BLD AUTO: 0.5 % (ref 0–1.5)
BILIRUB SERPL-MCNC: 0.3 MG/DL (ref 0.1–1.2)
BUN BLD-MCNC: 16 MG/DL (ref 8–23)
BUN/CREAT SERPL: 14.8 (ref 7–25)
CALCIUM SPEC-SCNC: 9.4 MG/DL (ref 8.6–10.5)
CHLORIDE SERPL-SCNC: 101 MMOL/L (ref 98–107)
CO2 SERPL-SCNC: 22.6 MMOL/L (ref 22–29)
CREAT BLD-MCNC: 1.08 MG/DL (ref 0.76–1.27)
D-LACTATE SERPL-SCNC: 1.5 MMOL/L (ref 0.5–2)
DEPRECATED RDW RBC AUTO: 46.6 FL (ref 37–54)
EOSINOPHIL # BLD AUTO: 0.29 10*3/MM3 (ref 0–0.7)
EOSINOPHIL NFR BLD AUTO: 2.9 % (ref 0.3–6.2)
ERYTHROCYTE [DISTWIDTH] IN BLOOD BY AUTOMATED COUNT: 15 % (ref 11.5–14.5)
GFR SERPL CREATININE-BSD FRML MDRD: 67 ML/MIN/1.73
GLOBULIN UR ELPH-MCNC: 4 GM/DL
GLUCOSE BLD-MCNC: 92 MG/DL (ref 65–99)
HCT VFR BLD AUTO: 44.4 % (ref 40.4–52.2)
HGB BLD-MCNC: 15 G/DL (ref 13.7–17.6)
HOLD SPECIMEN: NORMAL
HOLD SPECIMEN: NORMAL
IMM GRANULOCYTES # BLD: 0.03 10*3/MM3 (ref 0–0.03)
IMM GRANULOCYTES NFR BLD: 0.3 % (ref 0–0.5)
LYMPHOCYTES # BLD AUTO: 2.79 10*3/MM3 (ref 0.9–4.8)
LYMPHOCYTES NFR BLD AUTO: 27.9 % (ref 19.6–45.3)
MCH RBC QN AUTO: 29 PG (ref 27–32.7)
MCHC RBC AUTO-ENTMCNC: 33.8 G/DL (ref 32.6–36.4)
MCV RBC AUTO: 85.7 FL (ref 79.8–96.2)
MONOCYTES # BLD AUTO: 0.89 10*3/MM3 (ref 0.2–1.2)
MONOCYTES NFR BLD AUTO: 8.9 % (ref 5–12)
NEUTROPHILS # BLD AUTO: 5.96 10*3/MM3 (ref 1.9–8.1)
NEUTROPHILS NFR BLD AUTO: 59.5 % (ref 42.7–76)
NT-PROBNP SERPL-MCNC: 35.7 PG/ML (ref 0–900)
PLATELET # BLD AUTO: 269 10*3/MM3 (ref 140–500)
PMV BLD AUTO: 8.1 FL (ref 6–12)
POTASSIUM BLD-SCNC: 4.1 MMOL/L (ref 3.5–5.2)
PROCALCITONIN SERPL-MCNC: 0.05 NG/ML (ref 0.1–0.25)
PROT SERPL-MCNC: 7.9 G/DL (ref 6–8.5)
RBC # BLD AUTO: 5.18 10*6/MM3 (ref 4.6–6)
SODIUM BLD-SCNC: 138 MMOL/L (ref 136–145)
TROPONIN T SERPL-MCNC: <0.01 NG/ML (ref 0–0.03)
WBC NRBC COR # BLD: 10.01 10*3/MM3 (ref 4.5–10.7)
WHOLE BLOOD HOLD SPECIMEN: NORMAL
WHOLE BLOOD HOLD SPECIMEN: NORMAL

## 2017-07-07 PROCEDURE — 84484 ASSAY OF TROPONIN QUANT: CPT | Performed by: EMERGENCY MEDICINE

## 2017-07-07 PROCEDURE — 99284 EMERGENCY DEPT VISIT MOD MDM: CPT

## 2017-07-07 PROCEDURE — 93005 ELECTROCARDIOGRAM TRACING: CPT

## 2017-07-07 PROCEDURE — 83605 ASSAY OF LACTIC ACID: CPT | Performed by: EMERGENCY MEDICINE

## 2017-07-07 PROCEDURE — 36415 COLL VENOUS BLD VENIPUNCTURE: CPT

## 2017-07-07 PROCEDURE — 84145 PROCALCITONIN (PCT): CPT | Performed by: EMERGENCY MEDICINE

## 2017-07-07 PROCEDURE — 83880 ASSAY OF NATRIURETIC PEPTIDE: CPT | Performed by: EMERGENCY MEDICINE

## 2017-07-07 PROCEDURE — 87040 BLOOD CULTURE FOR BACTERIA: CPT | Performed by: EMERGENCY MEDICINE

## 2017-07-07 PROCEDURE — 71020 HC CHEST PA AND LATERAL: CPT

## 2017-07-07 PROCEDURE — 85025 COMPLETE CBC W/AUTO DIFF WBC: CPT | Performed by: EMERGENCY MEDICINE

## 2017-07-07 PROCEDURE — 71260 CT THORAX DX C+: CPT

## 2017-07-07 PROCEDURE — 36415 COLL VENOUS BLD VENIPUNCTURE: CPT | Performed by: EMERGENCY MEDICINE

## 2017-07-07 PROCEDURE — 93010 ELECTROCARDIOGRAM REPORT: CPT | Performed by: INTERNAL MEDICINE

## 2017-07-07 PROCEDURE — 80053 COMPREHEN METABOLIC PANEL: CPT | Performed by: EMERGENCY MEDICINE

## 2017-07-07 PROCEDURE — 0 IOPAMIDOL 61 % SOLUTION: Performed by: EMERGENCY MEDICINE

## 2017-07-07 RX ORDER — SODIUM CHLORIDE 0.9 % (FLUSH) 0.9 %
1-10 SYRINGE (ML) INJECTION AS NEEDED
Status: DISCONTINUED | OUTPATIENT
Start: 2017-07-07 | End: 2017-07-13 | Stop reason: HOSPADM

## 2017-07-07 RX ORDER — SODIUM CHLORIDE 9 MG/ML
125 INJECTION, SOLUTION INTRAVENOUS CONTINUOUS
Status: DISCONTINUED | OUTPATIENT
Start: 2017-07-07 | End: 2017-07-09

## 2017-07-07 RX ORDER — SODIUM CHLORIDE, SODIUM LACTATE, POTASSIUM CHLORIDE, CALCIUM CHLORIDE 600; 310; 30; 20 MG/100ML; MG/100ML; MG/100ML; MG/100ML
75 INJECTION, SOLUTION INTRAVENOUS CONTINUOUS
Status: DISCONTINUED | OUTPATIENT
Start: 2017-07-07 | End: 2017-07-12

## 2017-07-07 RX ORDER — ALUMINA, MAGNESIA, AND SIMETHICONE 2400; 2400; 240 MG/30ML; MG/30ML; MG/30ML
15 SUSPENSION ORAL EVERY 6 HOURS PRN
Status: DISCONTINUED | OUTPATIENT
Start: 2017-07-07 | End: 2017-07-13 | Stop reason: HOSPADM

## 2017-07-07 RX ORDER — SODIUM CHLORIDE 0.9 % (FLUSH) 0.9 %
10 SYRINGE (ML) INJECTION AS NEEDED
Status: DISCONTINUED | OUTPATIENT
Start: 2017-07-07 | End: 2017-07-13 | Stop reason: HOSPADM

## 2017-07-07 RX ORDER — NITROGLYCERIN 0.4 MG/1
0.4 TABLET SUBLINGUAL
Status: DISCONTINUED | OUTPATIENT
Start: 2017-07-07 | End: 2017-07-13 | Stop reason: HOSPADM

## 2017-07-07 RX ORDER — ACETAMINOPHEN 325 MG/1
650 TABLET ORAL EVERY 4 HOURS PRN
Status: DISCONTINUED | OUTPATIENT
Start: 2017-07-07 | End: 2017-07-13 | Stop reason: HOSPADM

## 2017-07-07 RX ORDER — AMIODARONE HYDROCHLORIDE 200 MG/1
200 TABLET ORAL DAILY
COMMUNITY
End: 2017-07-13 | Stop reason: HOSPADM

## 2017-07-07 RX ORDER — BISOPROLOL FUMARATE AND HYDROCHLOROTHIAZIDE 5; 6.25 MG/1; MG/1
1 TABLET ORAL DAILY
Status: DISCONTINUED | OUTPATIENT
Start: 2017-07-08 | End: 2017-07-10

## 2017-07-07 RX ORDER — DULOXETIN HYDROCHLORIDE 30 MG/1
30 CAPSULE, DELAYED RELEASE ORAL DAILY
Status: DISCONTINUED | OUTPATIENT
Start: 2017-07-08 | End: 2017-07-13 | Stop reason: HOSPADM

## 2017-07-07 RX ORDER — ATORVASTATIN CALCIUM 20 MG/1
20 TABLET, FILM COATED ORAL EVERY EVENING
Status: DISCONTINUED | OUTPATIENT
Start: 2017-07-07 | End: 2017-07-13 | Stop reason: HOSPADM

## 2017-07-07 RX ADMIN — SODIUM CHLORIDE, POTASSIUM CHLORIDE, SODIUM LACTATE AND CALCIUM CHLORIDE 75 ML/HR: 600; 310; 30; 20 INJECTION, SOLUTION INTRAVENOUS at 22:29

## 2017-07-07 RX ADMIN — IOPAMIDOL 75 ML: 612 INJECTION, SOLUTION INTRAVENOUS at 15:28

## 2017-07-07 RX ADMIN — SODIUM CHLORIDE 125 ML/HR: 9 INJECTION, SOLUTION INTRAVENOUS at 15:55

## 2017-07-07 RX ADMIN — ATORVASTATIN CALCIUM 20 MG: 20 TABLET, FILM COATED ORAL at 22:29

## 2017-07-07 NOTE — ED NOTES
Pt reports SOA and chest pain when he takes a deep breath that began 3 days ago. SOA is worsened with ambulation. Reassurance given; call light in reach. Pts breathing even and unlabored. Pt appears in NAD at this time. Family at bedside.      Felisha Leblanc RN  07/07/17 1600

## 2017-07-07 NOTE — ED PROVIDER NOTES
" EMERGENCY DEPARTMENT ENCOUNTER    CHIEF COMPLAINT  Chief Complaint: SOA  History given by: Patient  History limited by: N/A  Room Number: 14/14  PMD: Edmundo Reza MD   Cardiologist- Dr. Mosqueda       HPI:  Pt is a 72 y.o. male who presents complaining of SOA onset 3 days ago. Pt reports pleuritic CP with deep breathing. Pt denies a cough, fever, chills, N/V, leg swelling, urinary symptoms, and any recent travel. Pt reports a hx of AFIB. Pt reports a L hip replacement 3 months ago.    Duration: 3 days  Onset: Gradual  Timing: Constant  Location: N/A  Radiation: N/A  Quality: \"SOA\"  Intensity/Severity: Moderate  Progression: Worsening  Associated Symptoms: None  Aggravating Factors: Deep breathing  Alleviating Factors: None  Previous Episodes: None  Treatment before arrival: None    PAST MEDICAL HISTORY  Active Ambulatory Problems     Diagnosis Date Noted   • Abnormal thallium stress test 05/18/2016   • Atrial fibrillation 05/18/2016   • S/P CABG (coronary artery bypass graft) 05/18/2016   • Chest pain 05/18/2016   • Hyperlipidemia 05/18/2016   • Shortness of breath 05/18/2016   • Weak pulse 05/18/2016   • SOB (shortness of breath) 05/18/2016   • Chronic coronary artery disease 08/04/2014   • Degenerative joint disease (DJD) of hip 03/09/2017   • Palpitations 06/19/2017     Resolved Ambulatory Problems     Diagnosis Date Noted   • No Resolved Ambulatory Problems     Past Medical History:   Diagnosis Date   • Anxiety    • Arthritis    • Atrial fibrillation    • Coronary artery disease    • High cholesterol    • History of atrial fibrillation    • Limited joint range of motion    • PONV (postoperative nausea and vomiting)        PAST SURGICAL HISTORY  Past Surgical History:   Procedure Laterality Date   • COLONOSCOPY  2009   • CORONARY ARTERY BYPASS GRAFT  2014    3 VESSELS    • TONSILLECTOMY  1953   • TOTAL HIP ARTHROPLASTY Left 3/9/2017    Procedure: LT TOTAL HIP ARTHROPLASTY;  Surgeon: José Machado, " MD;  Location: [facility] MAIN OR;  Service:        FAMILY HISTORY  Family History   Problem Relation Age of Onset   • Family history unknown: Yes       SOCIAL HISTORY  Social History     Social History   • Marital status: Single     Spouse name: N/A   • Number of children: N/A   • Years of education: N/A     Occupational History   • Not on file.     Social History Main Topics   • Smoking status: Former Smoker     Packs/day: 0.50     Years: 27.00     Types: Cigarettes     Quit date: 2001   • Smokeless tobacco: Never Used   • Alcohol use No   • Drug use: No   • Sexual activity: Defer     Other Topics Concern   • Not on file     Social History Narrative       ALLERGIES  Review of patient's allergies indicates no known allergies.    REVIEW OF SYSTEMS  Review of Systems   Constitutional: Negative for chills and fever.   HENT: Negative for congestion and sore throat.    Respiratory: Positive for shortness of breath. Negative for cough.         Pleuritic CP   Cardiovascular: Negative for chest pain and leg swelling.   Gastrointestinal: Negative for abdominal pain, diarrhea and vomiting.   Genitourinary: Negative for decreased urine volume and dysuria.   Musculoskeletal: Negative for neck pain.   Skin: Negative for pallor and rash.   Neurological: Negative for weakness, numbness and headaches.   All other systems reviewed and are negative.      PHYSICAL EXAM  ED Triage Vitals   Temp Heart Rate Resp BP SpO2   07/07/17 1314 07/07/17 1314 07/07/17 1314 07/07/17 1314 07/07/17 1314   97.9 °F (36.6 °C) 91 22 112/80 96 %      Temp src Heart Rate Source Patient Position BP Location FiO2 (%)   07/07/17 1314 07/07/17 1314 07/07/17 1314 07/07/17 1314 --   Oral Monitor Lying Right arm        Physical Exam   Constitutional: He is oriented to person, place, and time and well-developed, well-nourished, and in no distress.   HENT:   Head: Normocephalic and atraumatic.   Eyes: EOM are normal. Pupils are equal, round, and reactive to light.    Neck: Normal range of motion. Neck supple.   Cardiovascular: Normal rate, regular rhythm and normal heart sounds.    Pulmonary/Chest: Effort normal. No respiratory distress. He has rales (bilaterally posteriorly).   Abdominal: Soft. There is no tenderness. There is no rebound and no guarding.   Musculoskeletal: Normal range of motion. He exhibits no edema.   Neurological: He is alert and oriented to person, place, and time. He has normal sensation and normal strength.   Skin: Skin is warm and dry.   Psychiatric: Mood and affect normal.   Nursing note and vitals reviewed.      LAB RESULTS  Lab Results (last 24 hours)     Procedure Component Value Units Date/Time    CBC & Differential [[id number]] Collected:  07/07/17 1333    Specimen:  Blood Updated:  07/07/17 1352    Narrative:       The following orders were created for panel order CBC & Differential.  Procedure                               Abnormality         Status                     ---------                               -----------         ------                     CBC Auto Differential[[id number]]        Abnormal            Final result                 Please view results for these tests on the individual orders.    Comprehensive Metabolic Panel [[id number]]  (Abnormal) Collected:  07/07/17 1333    Specimen:  Blood Updated:  07/07/17 1413     Glucose 92 mg/dL      BUN 16 mg/dL      Creatinine 1.08 mg/dL      Sodium 138 mmol/L      Potassium 4.1 mmol/L      Chloride 101 mmol/L      CO2 22.6 mmol/L      Calcium 9.4 mg/dL      Total Protein 7.9 g/dL      Albumin 3.90 g/dL      ALT (SGPT) 16 U/L      AST (SGOT) 19 U/L      Alkaline Phosphatase 119 (H) U/L      Total Bilirubin 0.3 mg/dL      eGFR Non African Amer 67 mL/min/1.73      Globulin 4.0 gm/dL      A/G Ratio 1.0 g/dL      BUN/Creatinine Ratio 14.8     Anion Gap 14.4 mmol/L     Narrative:       The MDRD GFR formula is only valid for adults with stable renal function between ages 18 and 70.    BNP  [[id number]]  (Normal) Collected:  07/07/17 1333    Specimen:  Blood Updated:  07/07/17 1410     proBNP 35.7 pg/mL     Narrative:       Among patients with dyspnea, NT-proBNP is highly sensitive for the detection of acute congestive heart failure. In addition NT-proBNP of <300 pg/ml effectively rules out acute congestive heart failure with 99% negative predictive value.    Troponin [[id number]]  (Normal) Collected:  07/07/17 1333    Specimen:  Blood Updated:  07/07/17 1413     Troponin T <0.010 ng/mL     Narrative:       Troponin T Reference Ranges:  Less than 0.03 ng/mL:    Negative for AMI  0.03 to 0.09 ng/mL:      Indeterminant for AMI  Greater than 0.09 ng/mL: Positive for AMI    CBC Auto Differential [[id number]]  (Abnormal) Collected:  07/07/17 1333    Specimen:  Blood Updated:  07/07/17 1352     WBC 10.01 10*3/mm3      RBC 5.18 10*6/mm3      Hemoglobin 15.0 g/dL      Hematocrit 44.4 %      MCV 85.7 fL      MCH 29.0 pg      MCHC 33.8 g/dL      RDW 15.0 (H) %      RDW-SD 46.6 fl      MPV 8.1 fL      Platelets 269 10*3/mm3      Neutrophil % 59.5 %      Lymphocyte % 27.9 %      Monocyte % 8.9 %      Eosinophil % 2.9 %      Basophil % 0.5 %      Immature Grans % 0.3 %      Neutrophils, Absolute 5.96 10*3/mm3      Lymphocytes, Absolute 2.79 10*3/mm3      Monocytes, Absolute 0.89 10*3/mm3      Eosinophils, Absolute 0.29 10*3/mm3      Basophils, Absolute 0.05 10*3/mm3      Immature Grans, Absolute 0.03 10*3/mm3     Procalcitonin [[id number]]  (Abnormal) Collected:  07/07/17 1333    Specimen:  Blood Updated:  07/07/17 1448     Procalcitonin 0.05 (L) ng/mL     Narrative:       As a Marker for Sepsis (Non-Neonates):   1. <0.5 ng/mL represents a low risk of severe sepsis and/or septic shock.  1. >2 ng/mL represents a high risk of severe sepsis and/or septic shock.    As a Marker for Lower Respiratory Tract Infections that require antibiotic therapy:  PCT on Admission     Antibiotic Therapy             6-12 Hrs later  > 0.5    "             Strongly Recommended            >0.25 - <0.5         Recommended  0.1 - 0.25           Discouraged                   Remeasure/reassess PCT  <0.1                 Strongly Discouraged          Remeasure/reassess PCT      As 28 day mortality risk marker: \"Change in Procalcitonin Result\" (> 80 % or <=80 %) if Day 0 (or Day 1) and Day 4 values are available. Refer to http://www.[employer]pct-calculator.com/   Change in PCT <=80 %   A decrease of PCT levels below or equal to 80 % defines a positive change in PCT test result representing a higher risk for 28-day all-cause mortality of patients diagnosed with severe sepsis or septic shock.  Change in PCT > 80 %   A decrease of PCT levels of more than 80 % defines a negative change in PCT result representing a lower risk for 28-day all-cause mortality of patients diagnosed with severe sepsis or septic shock.                Blood Culture [[id number]] Collected:  07/07/17 1504    Specimen:  Blood from Arm, Left Updated:  07/07/17 1507    Lactic Acid, Plasma [[id number]]  (Normal) Collected:  07/07/17 1504    Specimen:  Blood Updated:  07/07/17 1524     Lactate 1.5 mmol/L     Blood Culture [[id number]] Collected:  07/07/17 1607    Specimen:  Blood from Arm, Left Updated:  07/07/17 1610          I ordered the above labs and reviewed the results.    RADIOLOGY  CT Chest With Contrast   Final Result   Extensive patchy airspace opacities throughout both lung   fields have a nonspecific appearance, but atypical pneumonia is   suspected. BOOP should be considered as well.       Discussed with Dr. Ross.       This report was finalized on 7/7/2017 4:31 PM by Dr. Jamaica Guadalupe MD.          XR Chest 2 View   Preliminary Result   New, patchy opacity in the lungs bilaterally favored to   represent pneumonia. I called the findings to Dr. Ross at 2:20 PM.               I ordered the above noted radiological studies. Interpreted by radiologist. Discussed with radiologist ( " Collins at 1618). Reviewed by me in PACS.     EKG         EKG time: 1302  Rhythm/Rate: NSR at 88 bpm  P waves and GA: Normal  QRS, axis: IVCD  ST and T waves: Nonspecific ST changes  Interpreted contemporaneously by me and independently viewed.  No significant change compared to prior (3/9/2017).      PROCEDURES  Procedures      PROGRESS AND CONSULTS  ED Course     2:21 PM:  Vitals: BP: 112/80 HR: 91 Temp: 97.9 °F (36.6 °C) (Oral) O2 sat: 96%  D/w pt normal EKG and CXR showing bilateral pneumonia. Discussed with pt plan for chest CT and labs for further evaluation.    Time 1634  Discussed case with Dr. Preston  Reviewed history, exam, results and treatments. Discussed concerns and plan of care. Dr. Preston accepts pt to be admitted to telemetry.     4:38 PM:  Vitals: BP: 125/66 HR: 78 Temp: 97.9 °F (36.6 °C) (Oral) O2 sat: 94%  Rechecked pt. Pt is resting comfortably. Discussed with pt CT results showing bilateral pulmonary infiltrates and plan for admission. Pt understands and agrees with the plan, all questions answered.      MEDICAL DECISION MAKING  Results were reviewed/discussed with the patient and they were also made aware of online access. Pt also made aware that some labs, such as cultures, will not be resulted during ER visit and follow up with PMD is necessary.     MDM  Number of Diagnoses or Management Options     Amount and/or Complexity of Data Reviewed  Clinical lab tests: ordered and reviewed  Tests in the radiology section of CPT®: ordered and reviewed  Tests in the medicine section of CPT®: ordered and reviewed           DIAGNOSIS  Final diagnoses:   Bilateral pulmonary infiltrates on chest x-ray   Shortness of breath       DISPOSITION  4:34 PM - Pt will be admitted by Dr. Preston to a telemetry bed. Pt is stable at this time.     Latest Documented Vital Signs:  As of 4:42 PM  BP- 125/66 HR- 78 Temp- 97.9 °F (36.6 °C) (Oral) O2 sat- 94%    --  Documentation assistance provided by kaila Torres for  Umair Ross MD.  Information recorded by the scribe was done at my direction and has been verified and validated by me.       Elan Torres  07/07/17 2052       Umair Ross MD  07/07/17 4665

## 2017-07-08 ENCOUNTER — ANESTHESIA (OUTPATIENT)
Dept: GASTROENTEROLOGY | Facility: HOSPITAL | Age: 72
End: 2017-07-08

## 2017-07-08 ENCOUNTER — APPOINTMENT (OUTPATIENT)
Dept: CARDIOLOGY | Facility: HOSPITAL | Age: 72
End: 2017-07-08

## 2017-07-08 ENCOUNTER — ANESTHESIA EVENT (OUTPATIENT)
Dept: GASTROENTEROLOGY | Facility: HOSPITAL | Age: 72
End: 2017-07-08

## 2017-07-08 LAB
ANION GAP SERPL CALCULATED.3IONS-SCNC: 12.5 MMOL/L
APPEARANCE FLD: ABNORMAL
APPEARANCE FLD: ABNORMAL
B PERT DNA SPEC QL NAA+PROBE: NOT DETECTED
B PERT DNA SPEC QL NAA+PROBE: NOT DETECTED
BUN BLD-MCNC: 13 MG/DL (ref 8–23)
BUN/CREAT SERPL: 14.6 (ref 7–25)
C PNEUM DNA NPH QL NAA+NON-PROBE: NOT DETECTED
C PNEUM DNA NPH QL NAA+NON-PROBE: NOT DETECTED
CALCIUM SPEC-SCNC: 8.4 MG/DL (ref 8.6–10.5)
CHLORIDE SERPL-SCNC: 103 MMOL/L (ref 98–107)
CO2 SERPL-SCNC: 24.5 MMOL/L (ref 22–29)
COLOR FLD: COLORLESS
COLOR FLD: COLORLESS
CREAT BLD-MCNC: 0.89 MG/DL (ref 0.76–1.27)
EOSINOPHIL NFR FLD MANUAL: 2 %
FLUAV H1 2009 PAND RNA NPH QL NAA+PROBE: NOT DETECTED
FLUAV H1 2009 PAND RNA NPH QL NAA+PROBE: NOT DETECTED
FLUAV H1 HA GENE NPH QL NAA+PROBE: NOT DETECTED
FLUAV H1 HA GENE NPH QL NAA+PROBE: NOT DETECTED
FLUAV H3 RNA NPH QL NAA+PROBE: NOT DETECTED
FLUAV H3 RNA NPH QL NAA+PROBE: NOT DETECTED
FLUAV SUBTYP SPEC NAA+PROBE: NOT DETECTED
FLUAV SUBTYP SPEC NAA+PROBE: NOT DETECTED
FLUBV RNA ISLT QL NAA+PROBE: NOT DETECTED
FLUBV RNA ISLT QL NAA+PROBE: NOT DETECTED
GFR SERPL CREATININE-BSD FRML MDRD: 84 ML/MIN/1.73
GLUCOSE BLD-MCNC: 121 MG/DL (ref 65–99)
HADV DNA SPEC NAA+PROBE: NOT DETECTED
HADV DNA SPEC NAA+PROBE: NOT DETECTED
HCOV 229E RNA SPEC QL NAA+PROBE: NOT DETECTED
HCOV 229E RNA SPEC QL NAA+PROBE: NOT DETECTED
HCOV HKU1 RNA SPEC QL NAA+PROBE: NOT DETECTED
HCOV HKU1 RNA SPEC QL NAA+PROBE: NOT DETECTED
HCOV NL63 RNA SPEC QL NAA+PROBE: NOT DETECTED
HCOV NL63 RNA SPEC QL NAA+PROBE: NOT DETECTED
HCOV OC43 RNA SPEC QL NAA+PROBE: NOT DETECTED
HCOV OC43 RNA SPEC QL NAA+PROBE: NOT DETECTED
HMPV RNA NPH QL NAA+NON-PROBE: NOT DETECTED
HMPV RNA NPH QL NAA+NON-PROBE: NOT DETECTED
HPIV1 RNA SPEC QL NAA+PROBE: NOT DETECTED
HPIV1 RNA SPEC QL NAA+PROBE: NOT DETECTED
HPIV2 RNA SPEC QL NAA+PROBE: NOT DETECTED
HPIV2 RNA SPEC QL NAA+PROBE: NOT DETECTED
HPIV3 RNA NPH QL NAA+PROBE: NOT DETECTED
HPIV3 RNA NPH QL NAA+PROBE: NOT DETECTED
HPIV4 P GENE NPH QL NAA+PROBE: NOT DETECTED
HPIV4 P GENE NPH QL NAA+PROBE: NOT DETECTED
LYMPHOCYTES NFR FLD MANUAL: 11 %
LYMPHOCYTES NFR FLD MANUAL: 35 %
M PNEUMO IGG SER IA-ACNC: NOT DETECTED
M PNEUMO IGG SER IA-ACNC: NOT DETECTED
MAGNESIUM SERPL-MCNC: 2.2 MG/DL (ref 1.6–2.4)
METHOD: ABNORMAL
METHOD: ABNORMAL
MONOCYTES NFR FLD: 1 %
MONOS+MACROS NFR FLD: 58 %
MONOS+MACROS NFR FLD: 77 %
NEUTROPHILS NFR FLD MANUAL: 10 %
NEUTROPHILS NFR FLD MANUAL: 6 %
NUC CELL # FLD: 425 /MM3
NUC CELL # FLD: 648 /MM3
OTHER CELLS FLUID PER 100/WBCS: 5 /100 WBCS
OTHER CELLS FLUID PER 100/WBCS: 9 /100 WBCS
POTASSIUM BLD-SCNC: 3.6 MMOL/L (ref 3.5–5.2)
RBC # FLD AUTO: 270 /MM3
RBC # FLD AUTO: 300 /MM3
RHINOVIRUS RNA SPEC NAA+PROBE: NOT DETECTED
RHINOVIRUS RNA SPEC NAA+PROBE: NOT DETECTED
RSV RNA NPH QL NAA+NON-PROBE: NOT DETECTED
RSV RNA NPH QL NAA+NON-PROBE: NOT DETECTED
SODIUM BLD-SCNC: 140 MMOL/L (ref 136–145)
T3FREE SERPL-MCNC: 2.52 PG/ML (ref 2–4.4)
TROPONIN T SERPL-MCNC: <0.01 NG/ML (ref 0–0.03)
TSH SERPL DL<=0.05 MIU/L-ACNC: 6.34 MIU/ML (ref 0.27–4.2)

## 2017-07-08 PROCEDURE — 87486 CHLMYD PNEUM DNA AMP PROBE: CPT | Performed by: INTERNAL MEDICINE

## 2017-07-08 PROCEDURE — 88184 FLOWCYTOMETRY/ TC 1 MARKER: CPT | Performed by: INTERNAL MEDICINE

## 2017-07-08 PROCEDURE — 87798 DETECT AGENT NOS DNA AMP: CPT | Performed by: INTERNAL MEDICINE

## 2017-07-08 PROCEDURE — 88185 FLOWCYTOMETRY/TC ADD-ON: CPT | Performed by: INTERNAL MEDICINE

## 2017-07-08 PROCEDURE — 0B9C8ZX DRAINAGE OF RIGHT UPPER LUNG LOBE, VIA NATURAL OR ARTIFICIAL OPENING ENDOSCOPIC, DIAGNOSTIC: ICD-10-PCS | Performed by: INTERNAL MEDICINE

## 2017-07-08 PROCEDURE — 80048 BASIC METABOLIC PNL TOTAL CA: CPT | Performed by: INTERNAL MEDICINE

## 2017-07-08 PROCEDURE — 93306 TTE W/DOPPLER COMPLETE: CPT

## 2017-07-08 PROCEDURE — 87581 M.PNEUMON DNA AMP PROBE: CPT | Performed by: INTERNAL MEDICINE

## 2017-07-08 PROCEDURE — 88189 FLOWCYTOMETRY/READ 16 & >: CPT | Performed by: INTERNAL MEDICINE

## 2017-07-08 PROCEDURE — 88312 SPECIAL STAINS GROUP 1: CPT | Performed by: INTERNAL MEDICINE

## 2017-07-08 PROCEDURE — 93306 TTE W/DOPPLER COMPLETE: CPT | Performed by: INTERNAL MEDICINE

## 2017-07-08 PROCEDURE — 87071 CULTURE AEROBIC QUANT OTHER: CPT | Performed by: INTERNAL MEDICINE

## 2017-07-08 PROCEDURE — 88112 CYTOPATH CELL ENHANCE TECH: CPT | Performed by: INTERNAL MEDICINE

## 2017-07-08 PROCEDURE — 25010000002 PROPOFOL 10 MG/ML EMULSION: Performed by: ANESTHESIOLOGY

## 2017-07-08 PROCEDURE — 84443 ASSAY THYROID STIM HORMONE: CPT | Performed by: NURSE PRACTITIONER

## 2017-07-08 PROCEDURE — 87206 SMEAR FLUORESCENT/ACID STAI: CPT | Performed by: INTERNAL MEDICINE

## 2017-07-08 PROCEDURE — 87116 MYCOBACTERIA CULTURE: CPT | Performed by: INTERNAL MEDICINE

## 2017-07-08 PROCEDURE — 93225 XTRNL ECG REC<48 HRS REC: CPT

## 2017-07-08 PROCEDURE — 87205 SMEAR GRAM STAIN: CPT | Performed by: INTERNAL MEDICINE

## 2017-07-08 PROCEDURE — 84484 ASSAY OF TROPONIN QUANT: CPT | Performed by: INTERNAL MEDICINE

## 2017-07-08 PROCEDURE — 93226 XTRNL ECG REC<48 HR SCAN A/R: CPT

## 2017-07-08 PROCEDURE — 93005 ELECTROCARDIOGRAM TRACING: CPT | Performed by: INTERNAL MEDICINE

## 2017-07-08 PROCEDURE — 0B9G8ZX DRAINAGE OF LEFT UPPER LUNG LOBE, VIA NATURAL OR ARTIFICIAL OPENING ENDOSCOPIC, DIAGNOSTIC: ICD-10-PCS | Performed by: INTERNAL MEDICINE

## 2017-07-08 PROCEDURE — 99222 1ST HOSP IP/OBS MODERATE 55: CPT | Performed by: INTERNAL MEDICINE

## 2017-07-08 PROCEDURE — 83735 ASSAY OF MAGNESIUM: CPT | Performed by: INTERNAL MEDICINE

## 2017-07-08 PROCEDURE — 93010 ELECTROCARDIOGRAM REPORT: CPT | Performed by: INTERNAL MEDICINE

## 2017-07-08 PROCEDURE — 87633 RESP VIRUS 12-25 TARGETS: CPT | Performed by: INTERNAL MEDICINE

## 2017-07-08 PROCEDURE — 84481 FREE ASSAY (FT-3): CPT | Performed by: NURSE PRACTITIONER

## 2017-07-08 PROCEDURE — 87102 FUNGUS ISOLATION CULTURE: CPT | Performed by: INTERNAL MEDICINE

## 2017-07-08 PROCEDURE — 89051 BODY FLUID CELL COUNT: CPT | Performed by: INTERNAL MEDICINE

## 2017-07-08 RX ORDER — LIDOCAINE HYDROCHLORIDE 20 MG/ML
INJECTION, SOLUTION INFILTRATION; PERINEURAL AS NEEDED
Status: DISCONTINUED | OUTPATIENT
Start: 2017-07-08 | End: 2017-07-08

## 2017-07-08 RX ORDER — LIDOCAINE HYDROCHLORIDE 10 MG/ML
0.5 INJECTION, SOLUTION INFILTRATION; PERINEURAL ONCE AS NEEDED
Status: DISCONTINUED | OUTPATIENT
Start: 2017-07-08 | End: 2017-07-13 | Stop reason: HOSPADM

## 2017-07-08 RX ORDER — PROPOFOL 10 MG/ML
VIAL (ML) INTRAVENOUS CONTINUOUS PRN
Status: DISCONTINUED | OUTPATIENT
Start: 2017-07-08 | End: 2017-07-08 | Stop reason: SURG

## 2017-07-08 RX ORDER — LIDOCAINE HYDROCHLORIDE 20 MG/ML
INJECTION, SOLUTION EPIDURAL; INFILTRATION; INTRACAUDAL; PERINEURAL AS NEEDED
Status: DISCONTINUED | OUTPATIENT
Start: 2017-07-08 | End: 2017-07-08 | Stop reason: HOSPADM

## 2017-07-08 RX ORDER — CHOLECALCIFEROL (VITAMIN D3) 125 MCG
5 CAPSULE ORAL NIGHTLY PRN
Status: DISCONTINUED | OUTPATIENT
Start: 2017-07-08 | End: 2017-07-13 | Stop reason: HOSPADM

## 2017-07-08 RX ORDER — PROPOFOL 10 MG/ML
VIAL (ML) INTRAVENOUS AS NEEDED
Status: DISCONTINUED | OUTPATIENT
Start: 2017-07-08 | End: 2017-07-08 | Stop reason: SURG

## 2017-07-08 RX ORDER — METOPROLOL TARTRATE 5 MG/5ML
INJECTION INTRAVENOUS
Status: COMPLETED
Start: 2017-07-08 | End: 2017-07-08

## 2017-07-08 RX ORDER — LIDOCAINE HYDROCHLORIDE 20 MG/ML
INJECTION, SOLUTION INFILTRATION; PERINEURAL AS NEEDED
Status: DISCONTINUED | OUTPATIENT
Start: 2017-07-08 | End: 2017-07-08 | Stop reason: SURG

## 2017-07-08 RX ORDER — SODIUM CHLORIDE 0.9 % (FLUSH) 0.9 %
3 SYRINGE (ML) INJECTION AS NEEDED
Status: DISCONTINUED | OUTPATIENT
Start: 2017-07-08 | End: 2017-07-13 | Stop reason: HOSPADM

## 2017-07-08 RX ORDER — SODIUM CHLORIDE, SODIUM LACTATE, POTASSIUM CHLORIDE, CALCIUM CHLORIDE 600; 310; 30; 20 MG/100ML; MG/100ML; MG/100ML; MG/100ML
1000 INJECTION, SOLUTION INTRAVENOUS CONTINUOUS PRN
Status: DISCONTINUED | OUTPATIENT
Start: 2017-07-08 | End: 2017-07-13 | Stop reason: HOSPADM

## 2017-07-08 RX ORDER — LIDOCAINE HYDROCHLORIDE 10 MG/ML
INJECTION, SOLUTION EPIDURAL; INFILTRATION; INTRACAUDAL; PERINEURAL AS NEEDED
Status: DISCONTINUED | OUTPATIENT
Start: 2017-07-08 | End: 2017-07-08 | Stop reason: HOSPADM

## 2017-07-08 RX ADMIN — DULOXETINE HYDROCHLORIDE 30 MG: 30 CAPSULE, DELAYED RELEASE ORAL at 13:23

## 2017-07-08 RX ADMIN — Medication 5 MG: at 21:40

## 2017-07-08 RX ADMIN — BISOPROLOL FUMARATE AND HYDROCHLOROTHIAZIDE 1 TABLET: 6.25; 5 TABLET ORAL at 13:23

## 2017-07-08 RX ADMIN — LIDOCAINE HYDROCHLORIDE 50 MG: 20 INJECTION, SOLUTION INFILTRATION; PERINEURAL at 11:50

## 2017-07-08 RX ADMIN — METOROPROLOL TARTRATE 5 MG: 5 INJECTION, SOLUTION INTRAVENOUS at 01:52

## 2017-07-08 RX ADMIN — DILTIAZEM HYDROCHLORIDE 5 MG/HR: 100 INJECTION, POWDER, LYOPHILIZED, FOR SOLUTION INTRAVENOUS at 02:04

## 2017-07-08 RX ADMIN — ACETAMINOPHEN 650 MG: 325 TABLET ORAL at 15:37

## 2017-07-08 RX ADMIN — ATORVASTATIN CALCIUM 20 MG: 20 TABLET, FILM COATED ORAL at 18:30

## 2017-07-08 RX ADMIN — SODIUM CHLORIDE, POTASSIUM CHLORIDE, SODIUM LACTATE AND CALCIUM CHLORIDE 1000 ML: 600; 310; 30; 20 INJECTION, SOLUTION INTRAVENOUS at 10:25

## 2017-07-08 RX ADMIN — SODIUM CHLORIDE, POTASSIUM CHLORIDE, SODIUM LACTATE AND CALCIUM CHLORIDE 75 ML/HR: 600; 310; 30; 20 INJECTION, SOLUTION INTRAVENOUS at 21:41

## 2017-07-08 RX ADMIN — PROPOFOL 180 MG: 10 INJECTION, EMULSION INTRAVENOUS at 11:50

## 2017-07-08 RX ADMIN — DILTIAZEM HYDROCHLORIDE 5 MG/HR: 100 INJECTION, POWDER, LYOPHILIZED, FOR SOLUTION INTRAVENOUS at 16:01

## 2017-07-08 RX ADMIN — PROPOFOL 200 MCG/KG/MIN: 10 INJECTION, EMULSION INTRAVENOUS at 11:52

## 2017-07-08 NOTE — PROGRESS NOTES
"  Daily Progress Note.   [address]  7/8/2017    Patient:  Name:  José Moody  MRN:  [id number]  1945  72 y.o.  male         Interval History:  No acute events since admission.  Chart/emar reviewed.  No hemoptysis.  No chest pain   No syncope.      Physical Exam:  /69  Pulse 80  Temp 97.6 °F (36.4 °C) (Oral)   Resp 18  Ht 66\" (167.6 cm)  Wt 190 lb (86.2 kg)  SpO2 92%  BMI 30.67 kg/m2  Body mass index is 30.67 kg/(m^2).    Intake/Output Summary (Last 24 hours) at 07/08/17 0850  Last data filed at 07/08/17 0540   Gross per 24 hour   Intake              750 ml   Output              450 ml   Net              300 ml     GENERAL:  NAD, Aox3  HEENT:  EOMI, nonicteric sclera, no JVD  PULMONARY:    no wheeze,+crackles worse in upper lobes, no rhonchi, symmetric air entry  CARDIAC:  RRR no MRG, S1 S2  ABD: SNTND BS+  EXT: no c/c/e, pulses symmetric 2+ bilaterally  NEURO:  CNs II-XII intact, no focal deficits  SKIN: no lesions, no rash  PSYCH: appropriate mood    Data Review:    Notable Labs:  procal 0.05  Wbc 10  Hg 15  plts 269    Imaging:  CT reviewed    Scheduled meds:      atorvastatin 20 mg Oral Q PM   bisoprolol-hydrochlorothiazide 1 tablet Oral Daily   DULoxetine 30 mg Oral Daily       ASSESSMENT  /  PLAN:  Bilateral Patchy Nodular Infiltrates  Pafib  CAD  HLD  Dyspnea    -bronch today.  -will obtain TTE as well to ensure no embolic phenom although I think this is less likely  -will rule out infection, send cytology, rule out out DAH  -procal low no fever no leukocytosis, no abx.    Alin Stallworth MD  [facility]  07/08/17  8:50 AM        "

## 2017-07-08 NOTE — PLAN OF CARE
Problem: Patient Care Overview (Adult)  Goal: Plan of Care Review    07/08/17 1214   Outcome Evaluation   Outcome Summary/Follow up Plan pt had Broncoscopy today pt continues on cardiziem drip due to RVR pt has a history of A-Fib but is in NSR with RVR pt has holter monitor on and they are doing a elictical study of the heart will cont to monitor.

## 2017-07-08 NOTE — ANESTHESIA PREPROCEDURE EVALUATION
Anesthesia Evaluation     Patient summary reviewed and Nursing notes reviewed   no history of anesthetic complications:  NPO Solid Status: > 8 hours  NPO Liquid Status: > 8 hours     Airway   Mallampati: II  TM distance: <3 FB  Neck ROM: limited  possible difficult intubation  Dental - normal exam     Pulmonary    (+) pneumonia , shortness of breath, decreased breath sounds,   Cardiovascular - normal exam    ECG reviewed  Rhythm: regular  Rate: normal    (+) CAD, CABG, dysrhythmias Atrial Fib, hyperlipidemia      Neuro/Psych  (+) psychiatric history Anxiety,    GI/Hepatic/Renal/Endo    (+) obesity,      Musculoskeletal     Abdominal   (+) obese,    Substance History - negative use     OB/GYN negative ob/gyn ROS         Other   (+) arthritis                                     Anesthesia Plan    ASA 3     general     intravenous induction   Anesthetic plan and risks discussed with patient.

## 2017-07-08 NOTE — ANESTHESIA PROCEDURE NOTES
Airway  Urgency: elective    Date/Time: 7/8/2017 11:50 AM  Airway not difficult    General Information and Staff    Patient location: endo.  Anesthesiologist: ROC HATFIELD    Indications and Patient Condition  Indications for airway management: airway protection    Preoxygenated: yes  MILS maintained throughout  Mask difficulty assessment: 1 - vent by mask    Final Airway Details  Final airway type: supraglottic airway      Successful airway: classic  Size 5    Number of attempts at approach: 1

## 2017-07-08 NOTE — ANESTHESIA POSTPROCEDURE EVALUATION
Patient: José Moody    Procedure Summary     Date Anesthesia Start Anesthesia Stop Room / Location    07/08/17 1141 1226  [facility] /  [facility]       Procedure Diagnosis Surgeon Provider    BRONCHOSCOPY WITH BAL RIGHT AND LEFT UPPER LOBE (Bilateral Bronchus) Bilateral pulmonary infiltrates on chest x-ray  (Bilateral pulmonary infiltrates on chest x-ray [R91.8]) MD Jayleen Lima MD          Anesthesia Type: general  Last vitals  /52 (07/08/17 1246)    Temp      Pulse 70 (07/08/17 1246)   Resp 16 (07/08/17 1246)    SpO2 94 % (07/08/17 1246)      Post Anesthesia Care and Evaluation    Patient location during evaluation: PACU  Patient participation: complete - patient participated  Level of consciousness: awake  Pain management: adequate  Airway patency: patent  Anesthetic complications: No anesthetic complications  PONV Status: none  Cardiovascular status: acceptable  Respiratory status: acceptable  Hydration status: acceptable

## 2017-07-08 NOTE — H&P
Patient Care Team:  Edmundo Reza MD as PCP - General (Family Medicine)  Tanisha Mosqueda MD (Cardiology)    Chief complaint shortness of breath    Subjective     Patient is a 72 y.o. male.  Progressively increasing shortness of breath over the past week or more particularly the last 3 days.  No cough no fevers chills sweats no sore throat or runny nose no muscle aches.  The only chest pain he has his right up at the top of his mid chest just below his neck and it hurts to sometimes take a deep breath there.  No chest pressure or heaviness.  He does have a history of heart disease he had coronary bypass graft ×3 vessels in 2014.  He has paroxysmal atrial fibrillation he was on amiodarone for about 8 months felt like it was making him tired so it was discontinued he was off of it for about a year and then he started having episodes of paroxysmal atrial fibrillation and was restarted about 2 months ago he's on 200 mg day.  He has no history of lung disease he smoked for 15 years he quit 15 years ago he worked out the [employer] with a lot of chemical exposures.  His only recent travel was to [place] now with 3 or 4 months ago.  No history of TB or TB exposure.  No lower extremity pain or swelling and no history of blood clots.  He's had no nausea vomiting diarrhea and there is no family history of lung disease and he is aware of      Review of Systems  Constitution: No fevers chills sweats no recent weight loss  Eyes: No acute visual changes  ENT: Sore throat runny nose or difficulty swallowing  Respiratory: As above  Cardiovascular: As above he does have some dyslipidemia but no significant hypertension he's never had heart attack and he's never had any congestive heart failure  Gastrointestinal: No bad heartburn or indigestion no history of ulcers no history of liver disease or hepatitis no melena or hematochezia  Genitourinary: No history of kidney disease no dysuria hematuria and  urinary retention frequency no history of prostate problems  Integument: No new skin rashes lumps bumps or nodules  Hematologic/Lymphatic: No history of blood clots easy bleeding or bruising  Musculoskeletal: Minimal arthritis he's had a left hip replacement  Neurological: No history of seizures strokes no recent headaches  Behavioral/Psych: As in drink or use illicit drugs again remote smoking history  Endocrine: No diabetes or hypothyroidism  Allergies/Immunologic: No known medication allergies    History  Past Medical History:   Diagnosis Date   • Anxiety    • Arthritis     LT HIP   • Atrial fibrillation    • Coronary artery disease    • High cholesterol    • History of atrial fibrillation     LAST HAD 1-2016   • Limited joint range of motion     LT HIP   • PONV (postoperative nausea and vomiting)     AFTER TONSILLECTOMY     Past Surgical History:   Procedure Laterality Date   • CARDIAC SURGERY     • COLONOSCOPY  2009   • CORONARY ARTERY BYPASS GRAFT  2014    3 VESSELS    • JOINT REPLACEMENT     • TONSILLECTOMY  1953   • TOTAL HIP ARTHROPLASTY Left 3/9/2017    Procedure: LT TOTAL HIP ARTHROPLASTY;  Surgeon: José Machado MD;  Location: [facility] OR;  Service:      Family History   Problem Relation Age of Onset   • Family history unknown: Yes     Social History     Social History   • Marital status: Single     Spouse name: N/A   • Number of children: N/A   • Years of education: N/A     Social History Main Topics   • Smoking status: Former Smoker     Packs/day: 0.50     Years: 27.00     Types: Cigarettes     Quit date: 2001   • Smokeless tobacco: Never Used   • Alcohol use No   • Drug use: No   • Sexual activity: Defer     Other Topics Concern   • None     Social History Narrative       Allergies:  Review of patient's allergies indicates no known allergies.    Medications:  Prior to Admission medications    Medication Sig Start Date End Date Taking? Authorizing Provider   amiodarone (PACERONE) 200 MG tablet  "Take 200 mg by mouth Daily.   Yes Historical Provider, MD   atorvastatin (LIPITOR) 20 MG tablet Take 20 mg by mouth Every Evening.    Historical Provider, MD   bisoprolol-hydrochlorothiazide (ZIAC) 5-6.25 MG per tablet Take 1 tablet by mouth Daily. 6/28/17   Tanisha Mosqueda MD   DULoxetine (CYMBALTA) 30 MG capsule Take 30 mg by mouth Daily.    Historical Provider, MD   ibuprofen (ADVIL,MOTRIN) 400 MG tablet Take 400 mg by mouth Every 6 (Six) Hours As Needed for mild pain (1-3).    Historical Provider, MD       atorvastatin 20 mg Oral Q PM   [START ON 7/8/2017] bisoprolol-hydrochlorothiazide 1 tablet Oral Daily   [START ON 7/8/2017] DULoxetine 30 mg Oral Daily       lactated ringers 75 mL/hr    sodium chloride 125 mL/hr Last Rate: 125 mL/hr (07/07/17 1555)       Objective     Vital Signs  Temp:  [97.9 °F (36.6 °C)-98 °F (36.7 °C)] 98 °F (36.7 °C)  Heart Rate:  [76-91] 79  Resp:  [16-22] 16  BP: (112-139)/(66-80) 139/66  Body mass index is 30.34 kg/(m^2).  No intake or output data in the 24 hours ending 07/07/17 2023       Flowsheet Rows         First Filed Value    Admission Height  66\" (167.6 cm) Documented at 07/07/2017 1314    Admission Weight  188 lb (85.3 kg) Documented at 07/07/2017 1314           Physical Exam:  General Appearance: Well-developed white male resting comfortably in bed he does not appear in any acute distress  Eyes: Conjunctiva are clear and anicteric  ENT: Mucous membranes are moist no erythema or exudates  Neck: No adenopathy or thyromegaly no jugular venous distention trachea midline  Lungs: He has a few crackles in both lung bases sort of dry no wheezes nonlabored symmetric expansion there is no dullness on percussion  Cardiac: Regular rate and rhythm no murmur or gallop on the monitor he is in sinus rhythm  Abdomen: Soft nontender no palpable hepatosplenomegaly or masses  : Not examined  Musc/Skel: Grossly normal there is no chest wall tenderness to pressure  Skin: No jaundice, no " rashes, no petechiae  Neuro: He is alert oriented cooperative he's up walking in the room he's grossly intact  Extremities/P Vascular: No clubbing cyanosis or edema he has palpable radial and dorsalis pedis pulses  MSE: Little anxious      Labs:    Results from last 7 days  Lab Units 07/07/17  1333   WBC 10*3/mm3 10.01   HEMOGLOBIN g/dL 15.0   PLATELETS 10*3/mm3 269         Radiographic Imaging:  Imaging Results (last 24 hours)     Procedure Component Value Units Date/Time    CT Chest With Contrast [[id number]] Collected:  07/07/17 1628     Updated:  07/07/17 1634    Narrative:       CT CHEST WITH IV CONTRAST     HISTORY: 72-year-old male with shortness of breath.     TECHNIQUE: 3 mm images were obtained through the chest after the  administration of IV contrast. There are no previous chest CTs for  comparison. Compared with chest radiograph performed earlier today.     FINDINGS: There are patchy irregular shaped airspace consolidations  within both lung fields involving the central and peripheral aspects of  both lungs. There are no pleural or pericardial effusions. There are  shotty mediastinal and hilar nodes, but there are no pathologically  enlarged nodes. There is a tiny right hepatic lobe cyst and there are  bilateral renal cysts.       Impression:       Extensive patchy airspace opacities throughout both lung  fields have a nonspecific appearance, but atypical pneumonia is  suspected. BOOP should be considered as well.     Discussed with Dr. Ross.     This report was finalized on 7/7/2017 4:31 PM by Dr. Jamaica Guadalupe MD.       XR Chest 2 View [[id number]] Collected:  07/07/17 1454     Updated:  07/07/17 1706    Narrative:       PA AND LATERAL CHEST X-RAY      HISTORY: Shortness of breath and weakness.     TECHNIQUE:  PA and lateral images of the chest are provided and  correlated with chest x-ray from 02/21/2017.     FINDINGS: There are sternal wires. The cardiomediastinal silhouette is  normal. There are  new, patchy areas of opacity throughout the lungs  bilaterally, probably representing pneumonia. There is no pleural  effusion or pneumothorax.       Impression:       New, patchy opacity in the lungs bilaterally favored to  represent pneumonia. I called the findings to Dr. Ross at 2:20 PM.     This report was finalized on 7/7/2017 5:02 PM by Dr. Bryon Reddy MD.             I have reviewed the CT scan and have reviewed it with the patient there are multiple patchy infiltrates somewhat nodular and larger areas with air bronchograms to gowing through most of them    Assessment/Plan     1. Extensive bilateral patchy nodular infiltrates in a patient with absolutely no infection symptoms progressive over at least a week and maybe longer normal white count and pro-calcitonin I suspect this is not an infectious process in the appearance certainly could be very consistent with cryptogenic organizing pneumonia.  Also he is on amiodarone which has been known to cause this.  I am not can up him on any antibiotics recently infected at this time I'm recommending a bronchoscopy I discussed the risks benefits and options including bleeding infection collapsed lung adverse reaction medication with the patient he consents have also informed him that I will not be able to do it.  One of my partners should be able to do it tomorrow he is agreeable.  We will brought to rule out infections including atypical infections including fungal and also biopsy for potential BOOP diagnosis.  We will consider empiric steroid therapy after bronchoscopy.  I'm going to hold amiodarone for now and ask cardiology to see the patient  2. Paroxysmal atrial fibrillation he is in sinus rhythm now I am again going to ask cardiology regarding stopping amiodarone  3. Coronary artery disease he seems to be stable at this point in time  4. Hyperlipidemia is on atorvastatin continue   5. Dyspnea secondary to #1 I think we'll watch his O2 supplemental O2  as needed      Beto Preston MD  07/07/17  8:23 PM    Time:

## 2017-07-08 NOTE — CONSULTS
"[facility]  Cardiology Consult Note    Patient Identification:  Name: José Moody  Age: 72 y.o.  Sex: male  :  1945  MRN: [id number]             Requesting Physician: Dr Preston    Reason for Consultation / Chief Complaint: Possible amiodarone toxicity, chest pain    History of Present Illness:   This patient is a 72-year-old white male followed in office for CAD and paroxysmal atrial fibrillation.  He had three-vessel bypass in  at which time he was discharged on amiodarone and Coumadin.  Both amiodarone and Coumadin were discontinued within 3 months.  Patient was seen in office on  at which time he was reporting episodes of palpitations lasting \"a few minutes\" several times daily with accompanying shortness of breath.  EKG done in office (see below) showed a sinus rhythm.  He was placed on Ziac.  The patient states he took it for approximately a week however did not have any improvement in palpitations so he stopped it and began taking amiodarone that he had at home and his medicine cabinet.  His last ischemic workup was a stress test in 2016 which showed normal myocardial perfusion with no evidence of ischemia and echo (see below) showed preserved LV systolic function, grade 1 diastolic dysfunction no significant valve disease.  He underwent left hip replacement in March of this year.  His PCP treats him for osteoarthritis and dyslipidemia    He presented to the emergency room yesterday reporting a 3 day history of chest pressure, shortness of breath and dyspnea on exertion with recurrent sensation of palpitations.  He reports non-radiating substernal chest pressure with deep inspiration and cough.  He denies any chest pain or pressure with exertion (walking one block).  The episodes of palpitations are randomly occurring lasting less than 2-3 minutes at a time without any associated chest pain, dizziness, lightheadedness, nausea, diaphoresis near syncope or syncope.  " The shortness of breath is elicited with walking less than 200 feet.  He denies any cough, fever, rigors, chills, PND or orthopnea.  We have been asked to see this patient regarding discontinuation of amiodarone due to concerns for pulmonary toxicity and chest pain    Admission EKG (see below) sinus rhythm with NSSTTW abnormalities in the inferior leads.  When compared to previous study (see below) no acute changes.;  Cardiac enzymes negative ×2.  BNP 35.7 with bilateral patchy lung infiltrates on admission chest x-ray      Comorbid cardiac risk factors: Hypertension, dyslipidemia    Past Medical History:  Past Medical History:   Diagnosis Date   • Anxiety    • Arthritis     LT HIP   • Atrial fibrillation    • Coronary artery disease    • High cholesterol    • History of atrial fibrillation     LAST HAD 1-2016   • Limited joint range of motion     LT HIP   • PONV (postoperative nausea and vomiting)     AFTER TONSILLECTOMY     Past Surgical History:  Past Surgical History:   Procedure Laterality Date   • CARDIAC SURGERY     • COLONOSCOPY  2009   • CORONARY ARTERY BYPASS GRAFT  2014    3 VESSELS    • JOINT REPLACEMENT     • TONSILLECTOMY  1953   • TOTAL HIP ARTHROPLASTY Left 3/9/2017    Procedure: LT TOTAL HIP ARTHROPLASTY;  Surgeon: José Machado MD;  Location: [facility];  Service:       Allergies:  No Known Allergies  Home Meds:  Prescriptions Prior to Admission   Medication Sig Dispense Refill Last Dose   • amiodarone (PACERONE) 200 MG tablet Take 200 mg by mouth Daily.      • atorvastatin (LIPITOR) 20 MG tablet Take 20 mg by mouth Every Evening.   Taking   • bisoprolol-hydrochlorothiazide (ZIAC) 5-6.25 MG per tablet Take 1 tablet by mouth Daily. 90 tablet 1    • DULoxetine (CYMBALTA) 30 MG capsule Take 30 mg by mouth Daily.      • ibuprofen (ADVIL,MOTRIN) 400 MG tablet Take 400 mg by mouth Every 6 (Six) Hours As Needed for mild pain (1-3).   Taking     Current Meds:   [unfilled]  Social History:   Social  History   Substance Use Topics   • Smoking status: Former Smoker     Packs/day: 0.50     Years: 27.00     Types: Cigarettes     Quit date: 2001   • Smokeless tobacco: Never Used   • Alcohol use No      Family History:  Family History   Problem Relation Age of Onset   • Family history unknown: Yes        Review of Systems    Constitutional: No fever, rigors, chills   Eyes: No vision changes, eye pain   ENT/oropharynx: No difficulty swallowing, sore throat, epistaxis, changes in hearing   Cardiovascular: No paroxysmal nocturnal dyspnea, orthopnea, diaphoresis, dizziness / syncopal episode   Respiratory: No shortness of breath, dyspnea on exertion, cough, wheezing hemoptysis   Gastrointestinal: No abdominal pain, nausea, vomiting, diarrhea   Genitourinary: No hematuria, dysuria   Neurological: No headache, tremors, numbness   Musculoskeletal: No cramps, myalgias,  joint swelling   Integument: No rash, edema           Constitutional:  Temp:  [97.3 °F (36.3 °C)-98 °F (36.7 °C)] 97.6 °F (36.4 °C)  Heart Rate:  [] 81  Resp:  [16-22] 18  BP: (112-173)/(53-89) 130/68    [facility]  General Appearance:  awake, alert, oriented, in no acute distress  Eyes:  No gross abnormalities.  Neck:  neck- supple, no mass, non-tender  Lungs:  No chest wall tenderness., No kyphosis or scoliosis., Breathing Pattern: regular, no distress, Breath sounds: diminished breath sounds- lower right and lower left  Heart:  Heart regular rate and rhythm  Rhythm: regular  Heart sounds: Normal S1, S2  Abdomen:  Soft, non-tender, normal bowel sounds; no bruits, organomegaly or masses.  Neurologic:  Alert and oriented x 3, gait normal., reflexes normal and symmetric, strength and  sensation grossly normal  Musculoskeletal:  Spine range of motion normal. Muscular strength intact.  Skin:  there are no suspicious lesions or rashes of concern  Psych exam:alert,oriented, in NAD with a full range of affect, normal behavior and no psychotic  features                Cardiographics  Admission ECG:       Comparison EKG 6-:      Telemetry:  0130:                                                     I                                                 V                                                      I                                                  V      Echocardiogram 6-2016:   · Left ventricular wall thickness is consistent with mild concentric hypertrophy.  · Left ventricular diastolic dysfunction (grade I) consistent with impaired relaxation.  · Mild tricuspid valve regurgitation is present.  · There is no evidence of pericardial effusion  · Left Ventricle: Calculated EF = 57.4%.      Imaging  Chest X-ray FINDINGS: There are sternal wires. The cardiomediastinal silhouette is normal. There are new, patchy areas of opacity throughout the lungsbilaterally, probably representing pneumonia. There is no pleural  effusion or pneumothorax.  IMPRESSION:New, patchy opacity in the lungs bilaterally favored to represent pneumonia.     Chest CT IMPRESSION:  Extensive patchy airspace opacities throughout both lung  fields have a nonspecific appearance, but atypical pneumonia is suspected. BOOP should be considered as well.        Lab Review     Results from last 7 days  Lab Units 07/08/17  0145 07/07/17  1333   TROPONIN T ng/mL <0.010 <0.010       Results from last 7 days  Lab Units 07/08/17  0145   MAGNESIUM mg/dL 2.2       Results from last 7 days  Lab Units 07/08/17  0145   SODIUM mmol/L 140   POTASSIUM mmol/L 3.6   BUN mg/dL 13   CREATININE mg/dL 0.89   CALCIUM mg/dL 8.4*       Results from last 7 days  Lab Units 07/07/17  1333   WBC 10*3/mm3 10.01   HEMOGLOBIN g/dL 15.0   HEMATOCRIT % 44.4   PLATELETS 10*3/mm3 269         Assessment:  - PSVT  - h/o PAF  - Chest pain-no MI.  - h/o 3v CABG 2014, (-) stress test 6-2016  - CARTER  - Bilateral patchy nodular pulmonary infiltrates  - HTN  - Dyslipidemia    Recommendations / Plan:   In summary, this is a  72-year-old male with history of CAD and paroxysmal atrial fibrillation admitted with reports of intermittent palpitations, chest pain, dyspnea on exertion and bilateral pulmonary infiltrates on chest x-ray.  Although EKGs demonstrate sinus rhythm.  Telemetry shows episodes of PSVT which converted to sinus rhythm on Cardizem drip.  He was started on low-dose beta blocker (documented history of bradycardia and suspected sick sinus syndrome) last month for reports of palpitations, but stopped Ziac and decided on his own to resume taking amiodarone approximately 2 weeks ago.  For now, agree with stopping amiodarone.  Will continue Cardizem drip and ask EPS to see for antiarrhythmic recommendation.  TFTs, 2-D echo and Holter monitor have been ordered.  His chest discomfort is atypical for angina in that is elicited with deep inspiration.  MI has been ruled out.  Would favor stress testing once pulmonary workup is complete.  This morning, the patient is nothing by mouth He has a [id number] score of >/= 1 with documented recurrent atrial arrhythmias.  Would start on full dose Lovenox following bronchoscopy when cleared by pulmonology. Further recommendations to treatment plan pending. Thank you for allowing us to participate in his care    Labs/tests ordered: TFTs, echo, Holter, EPS consult ordered by Dr del angel    Will add a dose of Lasix    I reviewed the patient's clinical results, medications and treatment plan I independently/personally viewed and interpreted the patient's radiographic/laboratory/ EKG/Telemetry data    Monique Lawrence, KORI  7/8/2017, 7:33 AM      EMR Dragon/Transcription:   Dictated utilizing Dragon dictation

## 2017-07-08 NOTE — PLAN OF CARE
Problem: Patient Care Overview (Adult)  Goal: Plan of Care Review  Outcome: Ongoing (interventions implemented as appropriate)    Problem: Respiratory Insufficiency (Adult)  Goal: Identify Related Risk Factors and Signs and Symptoms  Outcome: Ongoing (interventions implemented as appropriate)  Goal: Acid/Base Balance  Outcome: Ongoing (interventions implemented as appropriate)  Goal: Effective Ventilation  Outcome: Ongoing (interventions implemented as appropriate)    Problem: Activity Intolerance (Adult)  Goal: Identify Related Risk Factors and Signs and Symptoms  Outcome: Ongoing (interventions implemented as appropriate)  Goal: Activity Tolerance  Outcome: Ongoing (interventions implemented as appropriate)  Goal: Effective Energy Conservation Techniques  Outcome: Ongoing (interventions implemented as appropriate)

## 2017-07-09 ENCOUNTER — APPOINTMENT (OUTPATIENT)
Dept: CT IMAGING | Facility: HOSPITAL | Age: 72
End: 2017-07-09

## 2017-07-09 LAB
BH CV ECHO MEAS - ACS: 1.9 CM
BH CV ECHO MEAS - AO MEAN PG (FULL): 1 MMHG
BH CV ECHO MEAS - AO MEAN PG: 3 MMHG
BH CV ECHO MEAS - AO ROOT AREA (BSA CORRECTED): 1.5
BH CV ECHO MEAS - AO ROOT AREA: 7.1 CM^2
BH CV ECHO MEAS - AO ROOT DIAM: 3 CM
BH CV ECHO MEAS - AO V2 MAX: 122 CM/SEC
BH CV ECHO MEAS - AO V2 MEAN: 82 CM/SEC
BH CV ECHO MEAS - AO V2 VTI: 23.6 CM
BH CV ECHO MEAS - ASC AORTA: 2.9 CM
BH CV ECHO MEAS - AVA(I,A): 2.9 CM^2
BH CV ECHO MEAS - AVA(I,D): 2.9 CM^2
BH CV ECHO MEAS - BSA(HAYCOCK): 2 M^2
BH CV ECHO MEAS - BSA: 1.9 M^2
BH CV ECHO MEAS - BZI_BMI: 30.2 KILOGRAMS/M^2
BH CV ECHO MEAS - BZI_METRIC_HEIGHT: 167.6 CM
BH CV ECHO MEAS - BZI_METRIC_WEIGHT: 84.8 KG
BH CV ECHO MEAS - CONTRAST EF (2CH): 58.8 ML/M^2
BH CV ECHO MEAS - CONTRAST EF 4CH: 54.4 ML/M^2
BH CV ECHO MEAS - EDV(CUBED): 54.9 ML
BH CV ECHO MEAS - EDV(MOD-SP2): 102 ML
BH CV ECHO MEAS - EDV(MOD-SP4): 90 ML
BH CV ECHO MEAS - EDV(TEICH): 62 ML
BH CV ECHO MEAS - EF(CUBED): 71.5 %
BH CV ECHO MEAS - EF(MOD-SP2): 58.8 %
BH CV ECHO MEAS - EF(MOD-SP4): 56 %
BH CV ECHO MEAS - EF(TEICH): 64 %
BH CV ECHO MEAS - ESV(CUBED): 15.6 ML
BH CV ECHO MEAS - ESV(MOD-SP2): 42 ML
BH CV ECHO MEAS - ESV(MOD-SP4): 41 ML
BH CV ECHO MEAS - ESV(TEICH): 22.3 ML
BH CV ECHO MEAS - FS: 34.2 %
BH CV ECHO MEAS - IVS/LVPW: 1
BH CV ECHO MEAS - IVSD: 1.1 CM
BH CV ECHO MEAS - LAT PEAK E' VEL: 12 CM/SEC
BH CV ECHO MEAS - LV DIASTOLIC VOL/BSA (35-75): 46.3 ML/M^2
BH CV ECHO MEAS - LV MASS(C)D: 134.7 GRAMS
BH CV ECHO MEAS - LV MASS(C)DI: 69.3 GRAMS/M^2
BH CV ECHO MEAS - LV MEAN PG: 2 MMHG
BH CV ECHO MEAS - LV SYSTOLIC VOL/BSA (12-30): 21.1 ML/M^2
BH CV ECHO MEAS - LV V1 MAX: 113 CM/SEC
BH CV ECHO MEAS - LV V1 MEAN: 70.7 CM/SEC
BH CV ECHO MEAS - LV V1 VTI: 22 CM
BH CV ECHO MEAS - LVIDD: 3.8 CM
BH CV ECHO MEAS - LVIDS: 2.5 CM
BH CV ECHO MEAS - LVLD AP2: 7.7 CM
BH CV ECHO MEAS - LVLD AP4: 7.8 CM
BH CV ECHO MEAS - LVLS AP2: 6.1 CM
BH CV ECHO MEAS - LVLS AP4: 6.8 CM
BH CV ECHO MEAS - LVOT AREA (M): 3.1 CM^2
BH CV ECHO MEAS - LVOT AREA: 3.1 CM^2
BH CV ECHO MEAS - LVOT DIAM: 2 CM
BH CV ECHO MEAS - LVPWD: 1.1 CM
BH CV ECHO MEAS - MED PEAK E' VEL: 6 CM/SEC
BH CV ECHO MEAS - MV A DUR: 0.1 SEC
BH CV ECHO MEAS - MV A MAX VEL: 90.3 CM/SEC
BH CV ECHO MEAS - MV DEC SLOPE: 318 CM/SEC^2
BH CV ECHO MEAS - MV DEC TIME: 0.25 SEC
BH CV ECHO MEAS - MV E MAX VEL: 78.5 CM/SEC
BH CV ECHO MEAS - MV E/A: 0.87
BH CV ECHO MEAS - MV MEAN PG: 1 MMHG
BH CV ECHO MEAS - MV P1/2T MAX VEL: 79.9 CM/SEC
BH CV ECHO MEAS - MV P1/2T: 73.6 MSEC
BH CV ECHO MEAS - MV V2 MEAN: 48.7 CM/SEC
BH CV ECHO MEAS - MV V2 VTI: 28.1 CM
BH CV ECHO MEAS - MVA P1/2T LCG: 2.8 CM^2
BH CV ECHO MEAS - MVA(P1/2T): 3 CM^2
BH CV ECHO MEAS - MVA(VTI): 2.5 CM^2
BH CV ECHO MEAS - PA ACC SLOPE: 0 CM/SEC^2
BH CV ECHO MEAS - PA ACC TIME: 0.11 SEC
BH CV ECHO MEAS - PA MAX PG (FULL): 5.5 MMHG
BH CV ECHO MEAS - PA MAX PG: 8.2 MMHG
BH CV ECHO MEAS - PA PR(ACCEL): 30 MMHG
BH CV ECHO MEAS - PA V2 MAX: 143 CM/SEC
BH CV ECHO MEAS - PI END-D VEL: 121 CM/SEC
BH CV ECHO MEAS - PULM A REVS DUR: 0.13 SEC
BH CV ECHO MEAS - PULM A REVS VEL: 31.6 CM/SEC
BH CV ECHO MEAS - PULM DIAS VEL: 71.1 CM/SEC
BH CV ECHO MEAS - PULM S/D: 0.97
BH CV ECHO MEAS - PULM SYS VEL: 69.1 CM/SEC
BH CV ECHO MEAS - PVA(V,A): 2 CM^2
BH CV ECHO MEAS - PVA(V,D): 2 CM^2
BH CV ECHO MEAS - QP/QS: 0.82
BH CV ECHO MEAS - RAP SYSTOLE: 8 MMHG
BH CV ECHO MEAS - RV MAX PG: 2.6 MMHG
BH CV ECHO MEAS - RV MEAN PG: 1 MMHG
BH CV ECHO MEAS - RV V1 MAX: 81.2 CM/SEC
BH CV ECHO MEAS - RV V1 MEAN: 53 CM/SEC
BH CV ECHO MEAS - RV V1 VTI: 16.4 CM
BH CV ECHO MEAS - RVOT AREA: 3.5 CM^2
BH CV ECHO MEAS - RVOT DIAM: 2.1 CM
BH CV ECHO MEAS - RVSP: 33 MMHG
BH CV ECHO MEAS - SI(AO): 85.8 ML/M^2
BH CV ECHO MEAS - SI(CUBED): 20.2 ML/M^2
BH CV ECHO MEAS - SI(LVOT): 35.6 ML/M^2
BH CV ECHO MEAS - SI(MOD-SP2): 30.9 ML/M^2
BH CV ECHO MEAS - SI(MOD-SP4): 25.2 ML/M^2
BH CV ECHO MEAS - SI(TEICH): 20.4 ML/M^2
BH CV ECHO MEAS - SV(AO): 166.8 ML
BH CV ECHO MEAS - SV(CUBED): 39.2 ML
BH CV ECHO MEAS - SV(LVOT): 69.1 ML
BH CV ECHO MEAS - SV(MOD-SP2): 60 ML
BH CV ECHO MEAS - SV(MOD-SP4): 49 ML
BH CV ECHO MEAS - SV(RVOT): 56.8 ML
BH CV ECHO MEAS - SV(TEICH): 39.6 ML
BH CV ECHO MEAS - TAPSE (>1.6): 1.8 CM2
BH CV ECHO MEAS - TR MAX VEL: 251 CM/SEC
BH CV VAS BP RIGHT ARM: NORMAL MMHG
BH CV XLRA - RV BASE: 2.9 CM
BH CV XLRA - TDI S': 14 CM/SEC
CEA SERPL-MCNC: 3.22 NG/ML
E/E' RATIO: 10
GIE STN SPEC: NORMAL
GIE STN SPEC: NORMAL
LEFT ATRIUM VOLUME INDEX: 11 ML/M2
PSA SERPL-MCNC: 0.28 NG/ML (ref 0–4)

## 2017-07-09 PROCEDURE — 93005 ELECTROCARDIOGRAM TRACING: CPT | Performed by: INTERNAL MEDICINE

## 2017-07-09 PROCEDURE — 93010 ELECTROCARDIOGRAM REPORT: CPT | Performed by: INTERNAL MEDICINE

## 2017-07-09 PROCEDURE — 82378 CARCINOEMBRYONIC ANTIGEN: CPT | Performed by: INTERNAL MEDICINE

## 2017-07-09 PROCEDURE — 25010000002 METHYLPREDNISOLONE PER 40 MG: Performed by: INTERNAL MEDICINE

## 2017-07-09 PROCEDURE — 84153 ASSAY OF PSA TOTAL: CPT | Performed by: INTERNAL MEDICINE

## 2017-07-09 PROCEDURE — 99233 SBSQ HOSP IP/OBS HIGH 50: CPT | Performed by: INTERNAL MEDICINE

## 2017-07-09 PROCEDURE — 25010000002 ENOXAPARIN PER 10 MG: Performed by: NURSE PRACTITIONER

## 2017-07-09 PROCEDURE — 74176 CT ABD & PELVIS W/O CONTRAST: CPT

## 2017-07-09 RX ORDER — ASPIRIN 81 MG/1
81 TABLET ORAL DAILY
Status: DISCONTINUED | OUTPATIENT
Start: 2017-07-09 | End: 2017-07-13 | Stop reason: HOSPADM

## 2017-07-09 RX ORDER — METHYLPREDNISOLONE SODIUM SUCCINATE 40 MG/ML
40 INJECTION, POWDER, LYOPHILIZED, FOR SOLUTION INTRAMUSCULAR; INTRAVENOUS EVERY 6 HOURS
Status: DISCONTINUED | OUTPATIENT
Start: 2017-07-09 | End: 2017-07-12

## 2017-07-09 RX ORDER — FUROSEMIDE 10 MG/ML
40 INJECTION INTRAMUSCULAR; INTRAVENOUS DAILY
Status: DISCONTINUED | OUTPATIENT
Start: 2017-07-10 | End: 2017-07-10

## 2017-07-09 RX ADMIN — DULOXETINE HYDROCHLORIDE 30 MG: 30 CAPSULE, DELAYED RELEASE ORAL at 08:08

## 2017-07-09 RX ADMIN — BISOPROLOL FUMARATE AND HYDROCHLOROTHIAZIDE 1 TABLET: 6.25; 5 TABLET ORAL at 08:08

## 2017-07-09 RX ADMIN — ATORVASTATIN CALCIUM 20 MG: 20 TABLET, FILM COATED ORAL at 17:05

## 2017-07-09 RX ADMIN — Medication 5 MG: at 21:50

## 2017-07-09 RX ADMIN — ASPIRIN 81 MG: 81 TABLET ORAL at 11:15

## 2017-07-09 RX ADMIN — METHYLPREDNISOLONE SODIUM SUCCINATE 40 MG: 40 INJECTION, POWDER, FOR SOLUTION INTRAMUSCULAR; INTRAVENOUS at 13:33

## 2017-07-09 RX ADMIN — DILTIAZEM HYDROCHLORIDE 10 MG/HR: 100 INJECTION, POWDER, LYOPHILIZED, FOR SOLUTION INTRAVENOUS at 19:34

## 2017-07-09 RX ADMIN — METHYLPREDNISOLONE SODIUM SUCCINATE 40 MG: 40 INJECTION, POWDER, FOR SOLUTION INTRAMUSCULAR; INTRAVENOUS at 20:45

## 2017-07-09 RX ADMIN — ENOXAPARIN SODIUM 80 MG: 80 INJECTION SUBCUTANEOUS at 11:16

## 2017-07-09 RX ADMIN — ENOXAPARIN SODIUM 80 MG: 80 INJECTION SUBCUTANEOUS at 20:45

## 2017-07-09 RX ADMIN — METHYLPREDNISOLONE SODIUM SUCCINATE 40 MG: 40 INJECTION, POWDER, FOR SOLUTION INTRAMUSCULAR; INTRAVENOUS at 08:08

## 2017-07-09 NOTE — PLAN OF CARE
Problem: Patient Care Overview (Adult)  Goal: Plan of Care Review  Outcome: Ongoing (interventions implemented as appropriate)    07/09/17 0322   Coping/Psychosocial Response Interventions   Plan Of Care Reviewed With patient   Patient Care Overview   Progress improving   Outcome Evaluation   Outcome Summary/Follow up Plan Cardizem drip stopped tonight r/t protocol and pt HR falling into the 60s. No new nursing issues to report from the overnight period. Holter monitor in place.         Problem: Respiratory Insufficiency (Adult)  Goal: Identify Related Risk Factors and Signs and Symptoms  Outcome: Outcome(s) achieved Date Met:  07/09/17  Goal: Acid/Base Balance  Outcome: Ongoing (interventions implemented as appropriate)  Goal: Effective Ventilation  Outcome: Ongoing (interventions implemented as appropriate)    Problem: Activity Intolerance (Adult)  Goal: Identify Related Risk Factors and Signs and Symptoms  Outcome: Outcome(s) achieved Date Met:  07/09/17  Goal: Activity Tolerance  Outcome: Ongoing (interventions implemented as appropriate)  Goal: Effective Energy Conservation Techniques  Outcome: Ongoing (interventions implemented as appropriate)

## 2017-07-09 NOTE — PLAN OF CARE
Problem: Activity Intolerance (Adult)  Goal: Activity Tolerance  Outcome: Ongoing (interventions implemented as appropriate)

## 2017-07-09 NOTE — PROGRESS NOTES
"  Daily Progress Note.   [address]  7/9/2017    Patient:  Name:  José Moody  MRN:  [id number]  1945  72 y.o.  male         Interval History:  States he feels a little better respiratory wise.  Placed on oxygen overnight.  No hemoptysis    Physical Exam:  /69 (BP Location: Right arm, Patient Position: Lying)  Pulse 66  Temp 97.8 °F (36.6 °C) (Oral)   Resp 16  Ht 66\" (167.6 cm)  Wt 187 lb (84.8 kg)  SpO2 95%  BMI 30.18 kg/m2  Body mass index is 30.18 kg/(m^2).    Intake/Output Summary (Last 24 hours) at 07/09/17 0721  Last data filed at 07/09/17 0106   Gross per 24 hour   Intake              950 ml   Output             1025 ml   Net              -75 ml     GENERAL:  NAD, Aox3  HEENT:  EOMI, nonicteric sclera, no JVD  PULMONARY:    no wheeze,+crackles worse in upper lobes, no rhonchi, symmetric air entry  CARDIAC:  RRR no MRG, S1 S2  ABD: SNTND BS+  EXT: no c/c/e, pulses symmetric 2+ bilaterally  NEURO:  CNs II-XII intact, no focal deficits  SKIN: no lesions, no rash  PSYCH: appropriate mood    Data Review:    Notable Labs:  procal 0.05  Imaging:  CT reviewed    Scheduled meds:      atorvastatin 20 mg Oral Q PM   bisoprolol-hydrochlorothiazide 1 tablet Oral Daily   DULoxetine 30 mg Oral Daily   methylPREDNISolone sodium succinate 40 mg Intravenous Q6H       ASSESSMENT  /  PLAN:  Bilateral Patchy Nodular Infiltrates  Pafib  CAD  HLD  Dyspnea    Potentially , less likely amiodarone induced and will investigate metastatic potential with ct abd pelvis and some serologies.  -TTE reviewed, no embolic source  -reviewed bronchoscopy studies, to date dont appear to suggest infectous process, will start steroid treatment for  and wait further lab results.  Would recommend holding amiodarone at this point but not convinced this is causative agent in pulmonary infiltrates  -procal low no fever no leukocytosis, no abx.  -send psa, cea, and obtain ct abd pelvis    Likely home " with outpatient follow up pending cytology and micro results as well as clinical response to steroids.      Alin Stallworth MD  [facility]  07/09/17  8:50 AM

## 2017-07-09 NOTE — PROGRESS NOTES
[facility]  Cardiology Progress Note    Patient Identification:  Name: José Moody  Age: 72 y.o.  Sex: male  :  1945  MRN: [id number]                 Follow Up / Chief Complaint: r/o Amiiodarone toxicity, CAD, PAF, PSVT, HTN    Interval History:  72-year-old male with history of CAD and paroxysmal atrial fibrillation admitted with reports of intermittent palpitations, chest pain, dyspnea on exertion and bilateral pulmonary infiltrates on chest x-ray.  Patient decided to restart previously discontinued amiodarone 2 weeks ago due to persistent palpitations on low-dose beta blocker.  Documented PSVT on telemetry this admission.  History of bradycardia and suspected sick sinus syndrome     Subjective:      Objective:  CE (-) x3.  Abnormal TSH  Holter in progress  No further ectopy on telemetry with Cardizem gtt and low dose Ziac      Past Medical History:  Past Medical History:   Diagnosis Date   • Anxiety    • Arthritis     LT HIP   • Atrial fibrillation    • Coronary artery disease    • High cholesterol    • History of atrial fibrillation     LAST HAD    • Limited joint range of motion     LT HIP   • PONV (postoperative nausea and vomiting)     AFTER TONSILLECTOMY     Past Surgical History:  Past Surgical History:   Procedure Laterality Date   • CARDIAC SURGERY     • COLONOSCOPY     • CORONARY ARTERY BYPASS GRAFT      3 VESSELS    • JOINT REPLACEMENT     • TONSILLECTOMY     • TOTAL HIP ARTHROPLASTY Left 3/9/2017    Procedure: LT TOTAL HIP ARTHROPLASTY;  Surgeon: José Machado MD;  Location: [facility];  Service:         Social History:   Social History   Substance Use Topics   • Smoking status: Former Smoker     Packs/day: 0.50     Years: 27.00     Types: Cigarettes     Quit date:    • Smokeless tobacco: Never Used   • Alcohol use No      Family History:  Family History   Problem Relation Age of Onset   • Family history unknown: Yes          Allergies:  No  Known Allergies  Scheduled Meds:    atorvastatin 20 mg Q PM   bisoprolol-hydrochlorothiazide 1 tablet Daily   DULoxetine 30 mg Daily   methylPREDNISolone sodium succinate 40 mg Q6H           INTAKE AND OUTPUT:    Intake/Output Summary (Last 24 hours) at 07/09/17 0824  Last data filed at 07/09/17 0800   Gross per 24 hour   Intake             1700 ml   Output             1825 ml   Net             -125 ml       Review of Systems:   GI:no pain  Cardiac:paraxysmal tachcyardia  Pulmonary:short of air    Constitutional:  Temp:  [97.4 °F (36.3 °C)-98.5 °F (36.9 °C)] 97.8 °F (36.6 °C)  Heart Rate:  [58-80] 64  Resp:  [16-24] 16  BP: (111-152)/(51-93) 152/82    Physical Exam    General Appearance:  awake, alert, oriented, in no acute distress  Eyes:  No gross abnormalities.  Neck:  neck- supple, no mass, non-tender  Lungs:  No chest wall tenderness., No kyphosis or scoliosis., Breathing Pattern: regular, no distress, Breath sounds: diminished breath sounds- lower right and lower left  Heart:  Heart regular rate and rhythm, no murmur  Rhythm: regular  Heart sounds: Normal S1, S2  Abdomen:  Soft, non-tender, normal bowel sounds; no bruits, organomegaly or masses.  Neurologic:  Alert and oriented x 3, gait normal.,  strength and  sensation grossly normal  Musculoskeletal:  Spine range of motion normal. Muscular strength intact.  Skin:  there are no suspicious lesions or rashes of concern  Psych exam:alert,oriented, in NAD with a full range of affect, normal behavior and no psychotic features      Cardiographics  Telemetry: SR, no ectopy        Echocardiogram: results pending    Holter: in progress    Lab Review     Results from last 7 days  Lab Units 07/08/17  0145 07/07/17  1333   TROPONIN T ng/mL <0.010 <0.010      7/8/2017 01:45   TSH Baseline 6.340 (H)   T3, Free 2.52       Results from last 7 days  Lab Units 07/08/17  0145   MAGNESIUM mg/dL 2.2       Results from last 7 days  Lab Units 07/08/17  0145   SODIUM mmol/L 140    POTASSIUM mmol/L 3.6   BUN mg/dL 13   CREATININE mg/dL 0.89   CALCIUM mg/dL 8.4*       Results from last 7 days  Lab Units 07/07/17  1333   WBC 10*3/mm3 10.01   HEMOGLOBIN g/dL 15.0   HEMATOCRIT % 44.4   PLATELETS 10*3/mm3 269         Assessment:  - Palpitations  - Chest pain  - PSVT  - h/o PAF  - h/o 3v CABG 2014, (-) stress test 6-2016  - CARTER  - Bilateral patchy nodular pulmonary infiltrates-> Pulmonolgy  - Abnormal TSH -> primary service  - HTN  - Dyslipidemia      Plan:    - PSVT   => Palpitations.  H/o PAF.Holter in progress. No further ectopy on tele. With low dose Ziac and Cardizem gtt. On Lovenox.  EPS evaluation for recommendations regarding AAD    - Pulmonary infiltrates extensive:  It appears that he had been on amiodarone for more than 3 months, amiodarone toxicity is a concern    - Suspected sick sinus syndrome - history of bradycardia. Tolerating low dose Ziac so far without palpitations or bradycardia.   EPS evaluation for recommendations regarding AAD    - Chest pain -   No MI. H/o 3v CABG 2014, (-) stress test 6-2016    - HTN - Controlled on low dose Ziac and Cardizem gtt    - Dyslipidemia - On statin Lipid panel pending    Review ECHO and Holter. Lovenox/Ziac?Cardizem. EPS evaluation pending. Abnl TSH management as per primary service. Further ischemic work up pending    Labs/tests ordered for am  Mg    Will reviewe echo, add daily dose of Lasxi    I reviewed the patient's new clinical results and treatment plan. I personally viewed and interpreted the patient's EKG/Telemetry data    )7/9/2017  KORI Keller    Patient personally interviewed and above subjective findings personally confirmed during a face to face contact with patient today, PMH,Meds,SH and FH, ROS are obtained or reconfirmed by me and I personally performed the  physical examination.  All labs, cardiac procedures,pertinent radiographs of the last 24 hours personally reviewed, Impression and plans discussed/elaborated and  "implemented by me or jointly as described above -----------Memo Rhodes/Transcription:   \"Dictated utilizing Dragon dictation\".     "

## 2017-07-10 LAB
BACTERIA SPEC AEROBE CULT: NORMAL
BACTERIA SPEC AEROBE CULT: NORMAL
CHOLEST SERPL-MCNC: 154 MG/DL (ref 0–200)
GRAM STN SPEC: NORMAL
HDLC SERPL-MCNC: 44 MG/DL (ref 40–60)
LDLC SERPL CALC-MCNC: 93 MG/DL (ref 0–100)
LDLC/HDLC SERPL: 2.11 {RATIO}
TRIGL SERPL-MCNC: 85 MG/DL (ref 0–150)
VLDLC SERPL-MCNC: 17 MG/DL (ref 5–40)

## 2017-07-10 PROCEDURE — 25010000002 METHYLPREDNISOLONE PER 40 MG: Performed by: INTERNAL MEDICINE

## 2017-07-10 PROCEDURE — 94620 HC PULMONARY STRESS TEST SIMPLE: CPT

## 2017-07-10 PROCEDURE — 25010000002 ENOXAPARIN PER 10 MG: Performed by: NURSE PRACTITIONER

## 2017-07-10 PROCEDURE — 80061 LIPID PANEL: CPT | Performed by: NURSE PRACTITIONER

## 2017-07-10 PROCEDURE — 25010000002 FUROSEMIDE PER 20 MG: Performed by: INTERNAL MEDICINE

## 2017-07-10 PROCEDURE — 99232 SBSQ HOSP IP/OBS MODERATE 35: CPT | Performed by: INTERNAL MEDICINE

## 2017-07-10 RX ORDER — PREDNISONE 20 MG/1
40 TABLET ORAL DAILY
Qty: 60 TABLET | Refills: 0 | Status: SHIPPED | OUTPATIENT
Start: 2017-07-10 | End: 2017-08-21

## 2017-07-10 RX ORDER — ASPIRIN 81 MG/1
81 TABLET ORAL DAILY
Qty: 30 TABLET | Refills: 0 | Status: SHIPPED | OUTPATIENT
Start: 2017-07-10 | End: 2018-05-01 | Stop reason: HOSPADM

## 2017-07-10 RX ORDER — DILTIAZEM HYDROCHLORIDE 180 MG/1
180 CAPSULE, COATED, EXTENDED RELEASE ORAL
Status: DISCONTINUED | OUTPATIENT
Start: 2017-07-10 | End: 2017-07-10

## 2017-07-10 RX ORDER — DILTIAZEM HYDROCHLORIDE 180 MG/1
180 CAPSULE, COATED, EXTENDED RELEASE ORAL
Qty: 30 CAPSULE | Refills: 0 | Status: SHIPPED | OUTPATIENT
Start: 2017-07-10 | End: 2017-07-13 | Stop reason: HOSPADM

## 2017-07-10 RX ADMIN — ASPIRIN 81 MG: 81 TABLET ORAL at 09:08

## 2017-07-10 RX ADMIN — BISOPROLOL FUMARATE AND HYDROCHLOROTHIAZIDE 1 TABLET: 6.25; 5 TABLET ORAL at 09:08

## 2017-07-10 RX ADMIN — METHYLPREDNISOLONE SODIUM SUCCINATE 40 MG: 40 INJECTION, POWDER, FOR SOLUTION INTRAMUSCULAR; INTRAVENOUS at 22:26

## 2017-07-10 RX ADMIN — FUROSEMIDE 40 MG: 10 INJECTION, SOLUTION INTRAMUSCULAR; INTRAVENOUS at 09:08

## 2017-07-10 RX ADMIN — DILTIAZEM HYDROCHLORIDE 180 MG: 180 CAPSULE, COATED, EXTENDED RELEASE ORAL at 14:17

## 2017-07-10 RX ADMIN — ATORVASTATIN CALCIUM 20 MG: 20 TABLET, FILM COATED ORAL at 17:51

## 2017-07-10 RX ADMIN — DULOXETINE HYDROCHLORIDE 30 MG: 30 CAPSULE, DELAYED RELEASE ORAL at 09:08

## 2017-07-10 RX ADMIN — METHYLPREDNISOLONE SODIUM SUCCINATE 40 MG: 40 INJECTION, POWDER, FOR SOLUTION INTRAMUSCULAR; INTRAVENOUS at 09:07

## 2017-07-10 RX ADMIN — ENOXAPARIN SODIUM 80 MG: 80 INJECTION SUBCUTANEOUS at 22:25

## 2017-07-10 RX ADMIN — DILTIAZEM HYDROCHLORIDE 5 MG/HR: 100 INJECTION, POWDER, LYOPHILIZED, FOR SOLUTION INTRAVENOUS at 17:26

## 2017-07-10 RX ADMIN — METHYLPREDNISOLONE SODIUM SUCCINATE 40 MG: 40 INJECTION, POWDER, FOR SOLUTION INTRAMUSCULAR; INTRAVENOUS at 02:37

## 2017-07-10 RX ADMIN — DILTIAZEM HYDROCHLORIDE 5 MG/HR: 100 INJECTION, POWDER, LYOPHILIZED, FOR SOLUTION INTRAVENOUS at 17:27

## 2017-07-10 RX ADMIN — ENOXAPARIN SODIUM 80 MG: 80 INJECTION SUBCUTANEOUS at 09:07

## 2017-07-10 RX ADMIN — DILTIAZEM HYDROCHLORIDE 10 MG/HR: 100 INJECTION, POWDER, LYOPHILIZED, FOR SOLUTION INTRAVENOUS at 03:55

## 2017-07-10 RX ADMIN — Medication 5 MG: at 22:25

## 2017-07-10 RX ADMIN — METHYLPREDNISOLONE SODIUM SUCCINATE 40 MG: 40 INJECTION, POWDER, FOR SOLUTION INTRAMUSCULAR; INTRAVENOUS at 14:17

## 2017-07-10 NOTE — PROGRESS NOTES
"[facility]  Cardiology Progress Note    Patient Identification:  Name: José Moody  Age: 72 y.o.  Sex: male  :  1945  MRN: [id number]                 Follow Up / Chief Complaint: r/o Amiiodarone toxicity, CAD, PAF, PSVT, HTN    Interval History:  72-year-old male with history of CAD and paroxysmal atrial fibrillation admitted with reports of intermittent palpitations, chest pain, dyspnea on exertion and bilateral pulmonary infiltrates on chest x-ray.  Patient decided to restart previously discontinued amiodarone 2 weeks ago due to persistent palpitations on low-dose beta blocker.  Documented PSVT on telemetry this admission.  History of bradycardia and suspected sick sinus syndrome     Subjective:  Reports an episode of palpitations last evening (correlated with episode of PAF).  Denies chest pain, pressure or dizziness.  Breathing \"a lot better\" denies cough.  Denies  nausea or vomiting.  Anxious for discharge home    Reviewed cardiac test results and treatment plan to date with patient and daughter.  All questions were answered at this time    Objective:  Episode of PAF last pm (see below) while on Ziac.  Holter results pending  -150/50-80s    Past Medical History:  Past Medical History:   Diagnosis Date   • Anxiety    • Arthritis     LT HIP   • Atrial fibrillation    • Coronary artery disease    • High cholesterol    • History of atrial fibrillation     LAST HAD    • Limited joint range of motion     LT HIP   • PONV (postoperative nausea and vomiting)     AFTER TONSILLECTOMY     Past Surgical History:  Past Surgical History:   Procedure Laterality Date   • BRONCHOSCOPY Bilateral 2017    Procedure: BRONCHOSCOPY WITH BAL RIGHT AND LEFT UPPER LOBE;  Surgeon: Alin Stallworth MD;  Location: [facility] ENDOSCOPY;  Service:    • CARDIAC SURGERY     • COLONOSCOPY     • CORONARY ARTERY BYPASS GRAFT      3 VESSELS    • JOINT REPLACEMENT     • TONSILLECTOMY     • " "TOTAL HIP ARTHROPLASTY Left 3/9/2017    Procedure: LT TOTAL HIP ARTHROPLASTY;  Surgeon: José Machado MD;  Location: [facility] OR;  Service:         Social History:   Social History   Substance Use Topics   • Smoking status: Former Smoker     Packs/day: 0.50     Years: 27.00     Types: Cigarettes     Quit date: 2001   • Smokeless tobacco: Never Used   • Alcohol use No      Family History:  Family History   Problem Relation Age of Onset   • Family history unknown: Yes          Allergies:  No Known Allergies  Scheduled Meds:    aspirin 81 mg Daily   atorvastatin 20 mg Q PM   bisoprolol-hydrochlorothiazide 1 tablet Daily   DULoxetine 30 mg Daily   enoxaparin 1 mg/kg Q12H   furosemide 40 mg Daily   methylPREDNISolone sodium succinate 40 mg Q6H           INTAKE AND OUTPUT:    Intake/Output Summary (Last 24 hours) at 07/10/17 1200  Last data filed at 07/10/17 0715   Gross per 24 hour   Intake                0 ml   Output              675 ml   Net             -675 ml       Review of Systems:   GI: No nausea or vomiting  Cardiac: No chest pain.  Had palpitations last evening during PAF  Pulmonary:  Breathing \"much better\" denies cough      Constitutional:  Temp:  [97.3 °F (36.3 °C)-97.6 °F (36.4 °C)] 97.6 °F (36.4 °C)  Heart Rate:  [] 73  Resp:  [16-18] 16  BP: (107-146)/(58-82) 119/59    Physical Exam  General Appearance: Awake, alert eating lunch.  Appears in no acute distress  Eyes:  PERTL  HEENT:  neck- supple, no mass, non-tender  Lungs:  Respirations are regular and unlabored at rest on room air.  Bilateral breath sounds with good air entry in all lung fields.  No crackles or wheezes auscultated Heart:  S1S2 regular rate and rhythm at present.  No murmur  GI: Abdomen soft, non-tender, normal bowel sounds;  Musculoskeletal:  No abnormal movements   Skin:  Color is pink.  Skin warm and dry to touch.  Normal turgor   Neuro: AAO x3 CN II-XII grossly intact         Cardiographics  Telemetry: Currently, SR. Had " "PAF last pm    7-10-17 @2028      7- @2035:      7- @2039:          Echocardiogram:   · Left ventricular systolic function is normal  · .Calculated EF = 56%. Estimated EF appears to be in the range of 56 - 60%.  · Left ventricular diastolic dysfunction (grade I) consistent with impaired relaxation.  · Mild pulmonic valve regurgitation is present.  · calcification of the aortic valve    Holter: Results pending    Lab Review     Results from last 7 days  Lab Units 07/08/17  0145 07/07/17  1333   TROPONIN T ng/mL <0.010 <0.010      7/8/2017 01:45   TSH Baseline 6.340 (H)   T3, Free 2.52       Assessment:  - Palpitations  - Chest pain  - PSVT / PAF  - h/o PAF  - h/o 3v CABG 2014, (-) stress test 6-2016  - CARTER  - Bilateral patchy nodular pulmonary infiltrates-> Pulmonolgy  - Abnormal TSH -> primary service  - HTN  - Dyslipidemia      Plan:    - PSVT / PAF  =>  recurrent episode last evening while on low-dose Ziac. Cardizem gtt resumed.  Currently regular sinus rhythm  On bid Lovenox.  EPS evaluation for recommendations regarding AAD    - Pulmonary infiltrates: Patient had started himself back on amiodarone 2 weeks prior to admission.  This is been discontinued    - Suspected sick sinus syndrome - history of bradycardia.  Had recurrent PAF while on beta blocker.      - Chest pain -   No MI.  No recurrence.  H/o 3v CABG 2014, (-) stress test 6-2016    - HTN - on Ziac    - Dyslipidemia - at goal on current dose of statin       Stop IV Lasix and Ziac.  Start oral Cardizem and continue aspirin .  14 day Zio patch at discharge. Abnl TSH management as per IM           I reviewed the patient's new clinical results and treatment plan. I personally viewed and interpreted the patient's EKG/Telemetry data    )7/10/2017  Tanisha Mosqueda MD        EMR Dragon/Transcription:   \"Dictated utilizing Dragon dictation\".     "

## 2017-07-10 NOTE — PROGRESS NOTES
[facility]    Patient has elevated HR.  Will hold discharge, re-evaluate in morning

## 2017-07-10 NOTE — DISCHARGE SUMMARY
[facility]    Admit date: 7/7/2017  Discharge date: 7/13/2017    Admission/discharge diagnosis:  1. Pneumonia -- likely non-infectious; organizing pneumonia  2. Acute hypoxemic respiratory failure -- he still has exertional hypoxemia  3. Paroxysmal Afib -- it seems unlikely this is amio toxicity; amio has been stopped after careful consultation with cardiology and EP cardiology;  Additionally he has decided for forgo anticoagulation for now.   4. CAD    HPI:  Patient is a 72 y.o. male.  Progressively increasing shortness of breath over the past week or more particularly the last 3 days. No cough no fevers chills sweats no sore throat or runny nose no muscle aches. The only chest pain he has his right up at the top of his mid chest just below his neck and it hurts to sometimes take a deep breath there. No chest pressure or heaviness. He does have a history of heart disease he had coronary bypass graft ×3 vessels in 2014. He has paroxysmal atrial fibrillation he was on amiodarone for about 8 months felt like it was making him tired so it was discontinued he was off of it for about a year and then he started having episodes of paroxysmal atrial fibrillation and was restarted about 2 months ago he's on 200 mg day. He has no history of lung disease he smoked for 15 years he quit 15 years ago he worked out the [employer] with a lot of chemical exposures. His only recent travel was to [place] now with 3 or 4 months ago. No history of TB or TB exposure. No lower extremity pain or swelling and no history of blood clots. He's had no nausea vomiting diarrhea and there is no family history of lung disease and he is aware of    Hospital Course:  Mr. Moody underwent bronchscopy and he actually improved quite quickly.  He was anxious to go home.  Will continue on steroids for now and plan repeat CT chest in about 4 weeks with follow up in office at that time.  He will remain on prednisone 40mg until  follow up.  Should infiltrates persist or worsen on follow up CT, he may need open lung biopsy.  I did send serologic studies while here in the hospital that I hope will be available at time of follow up.      Procedures:  Bronch  TTE    Consultants:  Dr. Cristobal with Cardiology  Dr. Valdovinos with EP    Activity: as tolerated    Diet: pre admission    Discharge medications: see med rec    Follow-up:  Follow up with Dr. Alin Stallworth in the office in 4-6 weeks after repeat ct chest without contrast to follow up infiltrates  (office number [phone])  Follow up with Cardiology per Dr. Cristobal's instructions.    Greater than 30 mins spent in discharging patient.

## 2017-07-10 NOTE — PLAN OF CARE
Problem: Patient Care Overview (Adult)  Goal: Plan of Care Review  Outcome: Ongoing (interventions implemented as appropriate)    07/10/17 0644   Coping/Psychosocial Response Interventions   Plan Of Care Reviewed With patient   Outcome Evaluation   Outcome Summary/Follow up Plan cardizem drip restarted for hr sustained in the 140's; responded well; bp wnl and heart rate now in the 70's. patient without complaint       Goal: Adult Individualization and Mutuality  Outcome: Ongoing (interventions implemented as appropriate)  Goal: Discharge Needs Assessment  Outcome: Ongoing (interventions implemented as appropriate)    Problem: Respiratory Insufficiency (Adult)  Goal: Acid/Base Balance  Outcome: Ongoing (interventions implemented as appropriate)  Goal: Effective Ventilation  Outcome: Ongoing (interventions implemented as appropriate)    Problem: Activity Intolerance (Adult)  Goal: Activity Tolerance  Outcome: Ongoing (interventions implemented as appropriate)  Goal: Effective Energy Conservation Techniques  Outcome: Ongoing (interventions implemented as appropriate)

## 2017-07-10 NOTE — PLAN OF CARE
Problem: Patient Care Overview (Adult)  Goal: Plan of Care Review  Outcome: Ongoing (interventions implemented as appropriate)    Problem: Respiratory Insufficiency (Adult)  Goal: Acid/Base Balance  Outcome: Ongoing (interventions implemented as appropriate)  Goal: Effective Ventilation  Outcome: Ongoing (interventions implemented as appropriate)    Problem: Activity Intolerance (Adult)  Goal: Activity Tolerance  Outcome: Ongoing (interventions implemented as appropriate)  Goal: Effective Energy Conservation Techniques  Outcome: Ongoing (interventions implemented as appropriate)    Problem: Bronchoscopy (Adult)  Goal: Signs and Symptoms of Listed Potential Problems Will be Absent or Manageable (Bronchoscopy)  Outcome: Ongoing (interventions implemented as appropriate)

## 2017-07-10 NOTE — PROGRESS NOTES
Exercise Oximetry    Patient Name:José Moody   MRN: [id number]   Date: 07/10/17             ROOM AIR BASELINE   SpO2% 94   Heart Rate 99   Blood Pressure      EXERCISE ON ROOM AIR SpO2% EXERCISE ON O2 @ 2 LPM SpO2%   1 MINUTE 93 1 MINUTE 96   2 MINUTES 92 2 MINUTES 93   3 MINUTES 90 3 MINUTES 92   4 MINUTES 88 4 MINUTES 92   5 MINUTES 87 5 MINUTES 91   6 MINUTES 88 6 MINUTES 93              Distance Walked   Distance Walked   Dyspnea (Doc Scale)   Dyspnea (Doc Scale)   Fatigue (Doc Scale) Fatigue (Doc Scale)   SpO2% Post Exercise  89 SpO2% Post Exercise 95   HR Post Exercise  116 HR Post Exercise 108   Time to Recovery  2min Time to Recovery 2min     Comments: Pt denied SOA, dizziness or pain. Pt completed walk without rest.

## 2017-07-11 ENCOUNTER — APPOINTMENT (OUTPATIENT)
Dept: GENERAL RADIOLOGY | Facility: HOSPITAL | Age: 72
End: 2017-07-11

## 2017-07-11 LAB
ANION GAP SERPL CALCULATED.3IONS-SCNC: 16.1 MMOL/L
BUN BLD-MCNC: 28 MG/DL (ref 8–23)
BUN/CREAT SERPL: 23.7 (ref 7–25)
CALCIUM SPEC-SCNC: 8.9 MG/DL (ref 8.6–10.5)
CHLORIDE SERPL-SCNC: 99 MMOL/L (ref 98–107)
CO2 SERPL-SCNC: 22.9 MMOL/L (ref 22–29)
CREAT BLD-MCNC: 1.18 MG/DL (ref 0.76–1.27)
GFR SERPL CREATININE-BSD FRML MDRD: 61 ML/MIN/1.73
GLUCOSE BLD-MCNC: 182 MG/DL (ref 65–99)
MAGNESIUM SERPL-MCNC: 2.4 MG/DL (ref 1.6–2.4)
POTASSIUM BLD-SCNC: 3.7 MMOL/L (ref 3.5–5.2)
SODIUM BLD-SCNC: 138 MMOL/L (ref 136–145)

## 2017-07-11 PROCEDURE — 99221 1ST HOSP IP/OBS SF/LOW 40: CPT | Performed by: NURSE PRACTITIONER

## 2017-07-11 PROCEDURE — 99232 SBSQ HOSP IP/OBS MODERATE 35: CPT | Performed by: INTERNAL MEDICINE

## 2017-07-11 PROCEDURE — 71010 HC CHEST PA OR AP: CPT

## 2017-07-11 PROCEDURE — 25010000002 METHYLPREDNISOLONE PER 40 MG: Performed by: INTERNAL MEDICINE

## 2017-07-11 PROCEDURE — 80048 BASIC METABOLIC PNL TOTAL CA: CPT | Performed by: INTERNAL MEDICINE

## 2017-07-11 PROCEDURE — 83735 ASSAY OF MAGNESIUM: CPT | Performed by: INTERNAL MEDICINE

## 2017-07-11 PROCEDURE — 25010000002 ENOXAPARIN PER 10 MG: Performed by: NURSE PRACTITIONER

## 2017-07-11 RX ADMIN — METHYLPREDNISOLONE SODIUM SUCCINATE 40 MG: 40 INJECTION, POWDER, FOR SOLUTION INTRAMUSCULAR; INTRAVENOUS at 09:21

## 2017-07-11 RX ADMIN — DILTIAZEM HYDROCHLORIDE 5 MG/HR: 100 INJECTION, POWDER, LYOPHILIZED, FOR SOLUTION INTRAVENOUS at 14:59

## 2017-07-11 RX ADMIN — DILTIAZEM HYDROCHLORIDE 5 MG/HR: 100 INJECTION, POWDER, LYOPHILIZED, FOR SOLUTION INTRAVENOUS at 09:20

## 2017-07-11 RX ADMIN — METHYLPREDNISOLONE SODIUM SUCCINATE 40 MG: 40 INJECTION, POWDER, FOR SOLUTION INTRAMUSCULAR; INTRAVENOUS at 02:55

## 2017-07-11 RX ADMIN — ATORVASTATIN CALCIUM 20 MG: 20 TABLET, FILM COATED ORAL at 18:10

## 2017-07-11 RX ADMIN — ENOXAPARIN SODIUM 80 MG: 80 INJECTION SUBCUTANEOUS at 09:21

## 2017-07-11 RX ADMIN — ASPIRIN 81 MG: 81 TABLET ORAL at 09:21

## 2017-07-11 RX ADMIN — METHYLPREDNISOLONE SODIUM SUCCINATE 40 MG: 40 INJECTION, POWDER, FOR SOLUTION INTRAMUSCULAR; INTRAVENOUS at 22:47

## 2017-07-11 RX ADMIN — DULOXETINE HYDROCHLORIDE 30 MG: 30 CAPSULE, DELAYED RELEASE ORAL at 09:21

## 2017-07-11 RX ADMIN — METHYLPREDNISOLONE SODIUM SUCCINATE 40 MG: 40 INJECTION, POWDER, FOR SOLUTION INTRAMUSCULAR; INTRAVENOUS at 14:45

## 2017-07-11 NOTE — PLAN OF CARE
Problem: Patient Care Overview (Adult)  Goal: Plan of Care Review  Outcome: Ongoing (interventions implemented as appropriate)    07/10/17 2010 07/11/17 0437   Coping/Psychosocial Response Interventions   Plan Of Care Reviewed With patient --    Patient Care Overview   Progress --  improving   Outcome Evaluation   Outcome Summary/Follow up Plan --  Pt hopes to be discharged today. No new nursing issues to report from the overnight.         Problem: Respiratory Insufficiency (Adult)  Goal: Identify Related Risk Factors and Signs and Symptoms  Outcome: Outcome(s) achieved Date Met:  07/11/17  Goal: Acid/Base Balance  Outcome: Ongoing (interventions implemented as appropriate)  Goal: Effective Ventilation  Outcome: Ongoing (interventions implemented as appropriate)    Problem: Activity Intolerance (Adult)  Goal: Identify Related Risk Factors and Signs and Symptoms  Outcome: Outcome(s) achieved Date Met:  07/11/17  Goal: Activity Tolerance  Outcome: Ongoing (interventions implemented as appropriate)  Goal: Effective Energy Conservation Techniques  Outcome: Ongoing (interventions implemented as appropriate)

## 2017-07-11 NOTE — PROGRESS NOTES
[facility]     Mar/chart reviewed  F/u organizing pneumonia.  Discharge held yesterday 2/2 tachycardia  He feels better now, no  Palpitations.    Vital Sign Min/Max for last 24 hours  Temp  Min: 97.5 °F (36.4 °C)  Max: 97.8 °F (36.6 °C)   BP  Min: 119/59  Max: 136/75   Pulse  Min: 67  Max: 88   Resp  Min: 16  Max: 18   SpO2  Min: 94 %  Max: 95 %   Flow (L/min)  Min: 2  Max: 2   No Data Recorded     Nad, axox3,   perrl, eomi, no icterus,  mmm, no jvd, trachea midline, neck supple,  chest cta bilaterally, no crackles, no wheezes,   rrr,   soft, nt, nd +bs,  no c/c/ e    Labs:  Will order    A/P:  1. Organizing pneumonia -- continue prednisone  2. Acute hypoxemic respiratory failure -- was off oxygen yesterday  3. Pafib -- cardiology following -- off amio due to cncern for possible toxicity  4. CAD    Check bmp, mg, repeat cxr

## 2017-07-11 NOTE — PROGRESS NOTES
[facility]  Cardiology Progress Note    Patient Identification:  Name: José Moody  Age: 72 y.o.  Sex: male  :  1945  MRN: [id number]                 Follow Up / Chief Complaint: r/o Amiiodarone toxicity, CAD, PAF, PSVT, HTN    Interval History:  72-year-old male with history of CAD and paroxysmal atrial fibrillation admitted with reports of intermittent palpitations, chest pain, dyspnea on exertion and bilateral pulmonary infiltrates on chest x-ray.  Patient decided to restart previously discontinued amiodarone 2 weeks ago due to persistent palpitations on low-dose beta blocker.  Documented PSVT on telemetry this admission.  History of bradycardia and suspected sick sinus syndrome     Subjective:  Reported palpitations yesterday afternoon during recurrent PAFib/flutter. Denies chest pain or SOA. Discussed plans for EPS evaluation with pt and daughter. All questions were answered. They patrick voiced understanding and agreement with treatment plan.    Objective:  Recurrent PAFib/flutter rvr last pm (see below) after receiving oral Cardizem 180mg. Converted back to SR with Cardizem gtt. No pauses.  (Transient)  bradycardia ~57 after converting. No high degree a-v blocks or pauses    -130 / 70-80's this am without antihypertensive therapy. On full dose Lovenox    Past Medical History:  Past Medical History:   Diagnosis Date   • Anxiety    • Arthritis     LT HIP   • Atrial fibrillation    • Coronary artery disease    • High cholesterol    • History of atrial fibrillation     LAST HAD    • Limited joint range of motion     LT HIP   • PONV (postoperative nausea and vomiting)     AFTER TONSILLECTOMY     Past Surgical History:  Past Surgical History:   Procedure Laterality Date   • BRONCHOSCOPY Bilateral 2017    Procedure: BRONCHOSCOPY WITH BAL RIGHT AND LEFT UPPER LOBE;  Surgeon: Alin Stallworth MD;  Location: [facility] ENDOSCOPY;  Service:    • CARDIAC SURGERY     •  COLONOSCOPY  2009   • CORONARY ARTERY BYPASS GRAFT  2014    3 VESSELS    • JOINT REPLACEMENT     • TONSILLECTOMY  1953   • TOTAL HIP ARTHROPLASTY Left 3/9/2017    Procedure: LT TOTAL HIP ARTHROPLASTY;  Surgeon: José Machado MD;  Location: [facility] OR;  Service:         Social History:   Social History   Substance Use Topics   • Smoking status: Former Smoker     Packs/day: 0.50     Years: 27.00     Types: Cigarettes     Quit date: 2001   • Smokeless tobacco: Never Used   • Alcohol use No      Family History:  Family History   Problem Relation Age of Onset   • Family history unknown: Yes          Allergies:  No Known Allergies  Scheduled Meds:    aspirin 81 mg Daily   atorvastatin 20 mg Q PM   DULoxetine 30 mg Daily   enoxaparin 1 mg/kg Q12H   methylPREDNISolone sodium succinate 40 mg Q6H           INTAKE AND OUTPUT:    Intake/Output Summary (Last 24 hours) at 07/11/17 0858  Last data filed at 07/11/17 0500   Gross per 24 hour   Intake                0 ml   Output              450 ml   Net             -450 ml       Review of Systems:   GI: No nausea or vomiting  Cardiac: No chest pain.  Had palpitations last evening during PAFib/flutter  Pulmonary:  No SOA, PND or orthopnea      Constitutional:  Temp:  [97.5 °F (36.4 °C)-97.8 °F (36.6 °C)] 97.6 °F (36.4 °C)  Heart Rate:  [67-88] 67  Resp:  [16-18] 17  BP: (119-136)/(59-75) 127/64    Physical Exam  General Appearance: Awake, alert and resting quietly.  Appears in no acute distress  Eyes:  PERTL  HEENT:  No JVD. Oral mucosa moist, no cyanosis  Lungs:  Respirations are regular and unlabored at rest on room air.  Bilateral breath sounds with good air entry in all lung fields.  No crackles or wheezes auscultated Heart:  S1S2 regular rate and rhythm at present.  No murmur  GI: Abdomen soft, non-tender, normal bowel sounds;  Musculoskeletal:  No abnormal movements   Skin:  Color is pink.  Skin warm and dry to touch.  Normal turgor   Neuro: AAO x3 CN II-XII grossly  intact         Cardiographics  Telemetry: Currently, SR. Had atrial flutter with rvr yesterday afternoon   This am:      7- @1643        Echocardiogram:   · Left ventricular systolic function is normal  · .Calculated EF = 56%. Estimated EF appears to be in the range of 56 - 60%.  · Left ventricular diastolic dysfunction (grade I) consistent with impaired relaxation.  · Mild pulmonic valve regurgitation is present.  · calcification of the aortic valve    Holter: Results pending    Lab Review     Results from last 7 days  Lab Units 07/08/17  0145 07/07/17  1333   TROPONIN T ng/mL <0.010 <0.010      7/8/2017 01:45   TSH Baseline 6.340 (H)   T3, Free 2.52       Assessment:  - Palpitations  - Chest pain  -  PAFib/flutter with rvr  - h/o PAF  - h/o 3v CABG 2014, (-) stress test 6-2016  - CARTER  - Bilateral patchy nodular pulmonary infiltrates-> Pulmonolgy  - Abnormal TSH -> primary service  - HTN  - Dyslipidemia      Plan:    -  PAFib / flutter with rvr  =>  recurrent episodes while on low-dose Ziac and after oral Cardizem .Converted to SR on Cardizem gtt.  Currently regular sinus rhythm  [id number]= 1 On bid Lovenox.  EPS evaluation for recommendations regarding AAD    - Pulmonary infiltrates: Patient had started himself back on Amiodarone 2 weeks prior to admission.  Amiodarone has been discontinued    - Suspected sick sinus syndrome - history of bradycardia.  Had recurrent PAFib/flutter while on low dose beta blocker.      - Chest pain -   No MI.  No recurrence.  H/o 3v CABG 2014, (-) stress test 6-2016    - HTN - BP running 120's-130's / 60's-70's this am on Cardizem gtt    - Dyslipidemia - at goal on current dose of statin       Continue low dose Cardizem gtt and full dose Lovenox until patient is seen by EPS   Abnl TSH management as per IM       I reviewed the patient's new clinical results and treatment plan. I personally viewed and interpreted the patient's EKG/Telemetry data    )7/11/2017  Tanisha  "MD KATHARINE Mosqueda/Transcription:   \"Dictated utilizing Dragon dictation\".     "

## 2017-07-11 NOTE — PLAN OF CARE
Problem: Patient Care Overview (Adult)  Goal: Plan of Care Review  Outcome: Ongoing (interventions implemented as appropriate)    07/11/17 8442   Coping/Psychosocial Response Interventions   Plan Of Care Reviewed With patient   Patient Care Overview   Progress improving   Outcome Evaluation   Outcome Summary/Follow up Plan VSS, to remain on cardizem gtt per KORI Turner since previously not tolerating changing to PO meds, EP consulted & rec'd pt take low dose BB, xray chest shows no significant change       Goal: Adult Individualization and Mutuality  Outcome: Ongoing (interventions implemented as appropriate)  Goal: Discharge Needs Assessment  Outcome: Ongoing (interventions implemented as appropriate)    Problem: Respiratory Insufficiency (Adult)  Goal: Acid/Base Balance  Outcome: Ongoing (interventions implemented as appropriate)  Goal: Effective Ventilation  Outcome: Ongoing (interventions implemented as appropriate)    Problem: Activity Intolerance (Adult)  Goal: Activity Tolerance  Outcome: Ongoing (interventions implemented as appropriate)  Goal: Effective Energy Conservation Techniques  Outcome: Ongoing (interventions implemented as appropriate)

## 2017-07-11 NOTE — CONSULTS
Electrophysiology Hospital Consult            Patient Name: José Moody  Age/Sex: 72 y.o. male  : 1945  MRN: [id number]    Date of Admission: 2017  Date of Encounter Visit: 17  Encounter Provider: KORI Lucas  Referring Provider: Beto Preston MD  Place of Service: [facility] CARDIOLOGY  Patient Care Team:  Edmundo Reza MD as PCP - General (Family Medicine)  Tanisha Mosqueda MD (Cardiology)      Subjective:   EP Consultation for: AAD recommendations     Chief Complaint: Atrial fibrillation     History of Present Illness:  José Moody is a 72 y.o. male patient of Dr. Chao with a history of bradycardia with suspected sick sinus syndrome, CAD s/p CABG in , post-operative atrial fibrillation that was placed on amiodarone and coumadin; discontinued after 3 months. His beta blocker had to be discontinued as well at that time due to bradycardia. He presented to their office last month with the complaint of palpitations and EKG showed sinus rhythm and was started on low dose Ziac. After two weeks of seeing them in office patient started to have more palpitations and restarted his amiodarone on his own. Patient was admitted with atypical pneumonia and thought to be amiodarone toxicity, but patient has only been on amiodarone for two weeks. His amiodarone was discontinued and a was in SR at the time. A Zio Patch was placed and patient was to go home per patient request. As he was being discharged patient had a burst of atrial fibrillation and heart rate was reported to be 190. His discharged was cancelled and he was placed on a Cardizem gtt. Beat blocker have not been tried again due to history of bradycardia with the use of beta blockers. We have been asked to see for AAD recommendations.     He denies chest pain but has had the progressive shortness of breath for the last few weeks along with the palpitations. He says that he  has always had a little bit of a low heart rate due to being very active as a younger man. He has never had any dizziness, near syncope or syncope. Per Dr. Stallworth's note is it felt that he has organizing pneumonia and he is being treated with steroids.       Past Medical History:  Past Medical History:   Diagnosis Date   • Anxiety    • Arthritis     LT HIP   • Atrial fibrillation    • Coronary artery disease    • High cholesterol    • History of atrial fibrillation     LAST HAD 1-2016   • Limited joint range of motion     LT HIP   • PONV (postoperative nausea and vomiting)     AFTER TONSILLECTOMY       Past Surgical History:   Procedure Laterality Date   • BRONCHOSCOPY Bilateral 7/8/2017    Procedure: BRONCHOSCOPY WITH BAL RIGHT AND LEFT UPPER LOBE;  Surgeon: Alin Stallworth MD;  Location: [facility] ENDOSCOPY;  Service:    • CARDIAC SURGERY     • COLONOSCOPY  2009   • CORONARY ARTERY BYPASS GRAFT  2014    3 VESSELS    • JOINT REPLACEMENT     • TONSILLECTOMY  1953   • TOTAL HIP ARTHROPLASTY Left 3/9/2017    Procedure: LT TOTAL HIP ARTHROPLASTY;  Surgeon: José Machado MD;  Location: [facility] MAIN OR;  Service:        Home Medications:   Prescriptions Prior to Admission   Medication Sig Dispense Refill Last Dose   • amiodarone (PACERONE) 200 MG tablet Take 200 mg by mouth Daily.      • atorvastatin (LIPITOR) 20 MG tablet Take 20 mg by mouth Every Evening.   Taking   • bisoprolol-hydrochlorothiazide (ZIAC) 5-6.25 MG per tablet Take 1 tablet by mouth Daily. 90 tablet 1    • DULoxetine (CYMBALTA) 30 MG capsule Take 30 mg by mouth Daily.      • ibuprofen (ADVIL,MOTRIN) 400 MG tablet Take 400 mg by mouth Every 6 (Six) Hours As Needed for mild pain (1-3).   Taking       Allergies:  No Known Allergies    Past Social History:  Social History     Social History   • Marital status: Single     Spouse name: N/A   • Number of children: N/A   • Years of education: N/A     Occupational History   • Not on file.     Social History  Main Topics   • Smoking status: Former Smoker     Packs/day: 0.50     Years: 27.00     Types: Cigarettes     Quit date: 2001   • Smokeless tobacco: Never Used   • Alcohol use No   • Drug use: No   • Sexual activity: Defer     Other Topics Concern   • Not on file     Social History Narrative       Past Family History:  Family History   Problem Relation Age of Onset   • Family history unknown: Yes       Review of Systems: All systems reviewed. Pertinent positives identified in HPI. All other systems are negative.     14 point ROS was performed and is negative except as outlined in HPI.     Objective:     Objective:  Vital Signs (last 24 hours)       07/10 0700  -  07/11 0659 07/11 0700  -  07/11 0933   Most Recent    Temp (°F) 97.5 -  97.8      97.6     97.6 (36.4)    Heart Rate 71 -  88      67     67    Resp 16 -  18      17     17    /59 -  136/75      127/64     127/64    SpO2 (%) 93 -  95      94     94        Temp:  [97.5 °F (36.4 °C)-97.8 °F (36.6 °C)] 97.5 °F (36.4 °C)  Heart Rate:  [67-72] 70  Resp:  [16-18] 17  BP: (122-136)/(63-75) 128/63  Body mass index is 30.18 kg/(m^2).        Physical Exam:   Physical Exam   Constitutional: He is oriented to person, place, and time. He appears well-developed and well-nourished. No distress.   HENT:   Head: Normocephalic and atraumatic.   Eyes: Conjunctivae are normal. No scleral icterus.   Neck: Neck supple.   Cardiovascular: Normal rate, regular rhythm and normal heart sounds.    Pulmonary/Chest: Effort normal and breath sounds normal. No respiratory distress.   Abdominal: Soft.   Musculoskeletal: Normal range of motion. He exhibits no edema.   Neurological: He is alert and oriented to person, place, and time.   Skin: Skin is warm and dry.   Psychiatric: He has a normal mood and affect. His behavior is normal.        Labs:   Lab Review:       Results from last 7 days  Lab Units 07/11/17  1008 07/08/17  0145 07/07/17  1333   SODIUM mmol/L 138 140 138   POTASSIUM  mmol/L 3.7 3.6 4.1   CHLORIDE mmol/L 99 103 101   CO2 mmol/L 22.9 24.5 22.6   BUN mg/dL 28* 13 16   CREATININE mg/dL 1.18 0.89 1.08   GLUCOSE mg/dL 182* 121* 92   CALCIUM mg/dL 8.9 8.4* 9.4   AST (SGOT) U/L  --   --  19   ALT (SGPT) U/L  --   --  16       Results from last 7 days  Lab Units 07/08/17  0145 07/07/17  1333   TROPONIN T ng/mL <0.010 <0.010       Results from last 7 days  Lab Units 07/07/17  1333   WBC 10*3/mm3 10.01   HEMOGLOBIN g/dL 15.0   HEMATOCRIT % 44.4   PLATELETS 10*3/mm3 269           Results from last 7 days  Lab Units 07/10/17  0608   CHOLESTEROL mg/dL 154       Results from last 7 days  Lab Units 07/11/17  1008   MAGNESIUM mg/dL 2.4       Results from last 7 days  Lab Units 07/10/17  0608   CHOLESTEROL mg/dL 154   TRIGLYCERIDES mg/dL 85   HDL CHOL mg/dL 44       Results from last 7 days  Lab Units 07/07/17  1333   PROBNP pg/mL 35.7           Results from last 7 days  Lab Units 07/08/17  0145   TSH mIU/mL 6.340*           Imaging:     Imaging Results (most recent)     Procedure Component Value Units Date/Time    CT Chest With Contrast [[id number]] Collected:  07/07/17 1628     Updated:  07/07/17 1634    Narrative:       CT CHEST WITH IV CONTRAST     HISTORY: 72-year-old male with shortness of breath.     TECHNIQUE: 3 mm images were obtained through the chest after the  administration of IV contrast. There are no previous chest CTs for  comparison. Compared with chest radiograph performed earlier today.     FINDINGS: There are patchy irregular shaped airspace consolidations  within both lung fields involving the central and peripheral aspects of  both lungs. There are no pleural or pericardial effusions. There are  shotty mediastinal and hilar nodes, but there are no pathologically  enlarged nodes. There is a tiny right hepatic lobe cyst and there are  bilateral renal cysts.       Impression:       Extensive patchy airspace opacities throughout both lung  fields have a nonspecific appearance,  but atypical pneumonia is  suspected. BOOP should be considered as well.     Discussed with Dr. Ross.     This report was finalized on 7/7/2017 4:31 PM by Dr. Jamaica Guadalupe MD.       XR Chest 2 View [[id number]] Collected:  07/07/17 1454     Updated:  07/07/17 1706    Narrative:       PA AND LATERAL CHEST X-RAY      HISTORY: Shortness of breath and weakness.     TECHNIQUE:  PA and lateral images of the chest are provided and  correlated with chest x-ray from 02/21/2017.     FINDINGS: There are sternal wires. The cardiomediastinal silhouette is  normal. There are new, patchy areas of opacity throughout the lungs  bilaterally, probably representing pneumonia. There is no pleural  effusion or pneumothorax.       Impression:       New, patchy opacity in the lungs bilaterally favored to  represent pneumonia. I called the findings to Dr. Ross at 2:20 PM.     This report was finalized on 7/7/2017 5:02 PM by Dr. Bryon Reddy MD.       CT Abdomen Pelvis Stone Protocol [[id number]] Collected:  07/09/17 1504     Updated:  07/09/17 1516    Narrative:       CT OF THE ABDOMEN AND PELVIS WITHOUT CONTRAST     HISTORY: 72-year-old male with a history of bilateral lung infiltrates  of uncertain etiology, undergoing further evaluation to assess the  etiology.     TECHNIQUE: Contiguous axial images were obtained through the abdomen and  pelvis without IV or oral contrast.     COMPARISON: None.     FINDINGS: There are multiple irregular opacities in the bilateral lower  lobes, right middle lobe and lingula as stated on the prior chest CT of  07/07/2017. No significant change in the appearance of the opacities has  occurred since that examination.     Calcified granulomata are seen within the spleen. The adrenal glands,  pancreas, liver and gallbladder have a normal appearance. A 3.8 cm right  renal cyst is noted. There is a 2.9 cm left renal cyst. There is no  evidence for hydronephrosis or hydroureter. No free fluid or free gas  is  seen in the abdomen or pelvis. A normal appendix is visualized. No  abnormality of the bowel is appreciated. Moderate atheromatous  calcification is seen within the abdominal aorta and bilateral iliac  arteries. Evidence of prior total left hip arthroplasty is noted.       Impression:       1. There is no evidence for an active or acute intra-abdominal or pelvic  process.  2. Bilateral renal cysts as described.  3. Reidentified are multiple irregular pulmonary opacities at both lung  bases which may represent atypical pneumonia and/or pneumonitis.  4. Moderate atheromatous calcification within the abdominal aorta and  bilateral iliac arteries.     This report was finalized on 7/9/2017 3:13 PM by Dr. Faisal Stark MD.         ECHO 7/8:   · Left ventricular systolic function is normal.  · Left ventricular diastolic dysfunction (grade I) consistent with impaired relaxation.  · Mild pulmonic valve regurgitation is present.  · calcification of the aortic valve      TELE:         EKG:         I personally viewed and interpreted the patient's EKG/Telemetry data.    Assessment:     Active Problems:    Bilateral pulmonary infiltrates on chest x-ray        Plan:     Dr. Valdovinos and I have reviewed and discussed his case. Given his current inflammatory lung process and hypoxic respiratory failure it is not surprising that he is having episodes of PAF. He has not had any evidence of significant bradycardia during this admission that I am aware of (I reviewed [facility], spoke with the nurse and the monitor tech). He is not really symptomatic with his Afib other than he feels palpitations. We would recommend trying him on low dose beta blocker such as metoprolol 25mg twice daily and see how he dose. His PAF will likely simmer down as his pulmonary status improves. I did discuss with him that he will likely have some recurrent episodes of PAF and he should just sit down and try to relax and often it will convert on it's  own. If he does develop symptomatic bradycardia on low dose beta blocker then we can re-evaluate.     Thank you for allowing me to participate in the care of José Moody. Feel free to contact me directly with any further questions or concerns.    KORI Lucas  [facility]  07/11/17  5:08 PM

## 2017-07-12 LAB
BACTERIA SPEC AEROBE CULT: NORMAL
BACTERIA SPEC AEROBE CULT: NORMAL
GLUCOSE BLDC GLUCOMTR-MCNC: 122 MG/DL (ref 70–130)
REF LAB TEST METHOD: NORMAL
REF LAB TEST METHOD: NORMAL

## 2017-07-12 PROCEDURE — 86256 FLUORESCENT ANTIBODY TITER: CPT | Performed by: INTERNAL MEDICINE

## 2017-07-12 PROCEDURE — 99232 SBSQ HOSP IP/OBS MODERATE 35: CPT | Performed by: NURSE PRACTITIONER

## 2017-07-12 PROCEDURE — 25010000002 AMIODARONE IN DEXTROSE 5% 150-4.21 MG/100ML-% SOLUTION: Performed by: INTERNAL MEDICINE

## 2017-07-12 PROCEDURE — 99233 SBSQ HOSP IP/OBS HIGH 50: CPT | Performed by: INTERNAL MEDICINE

## 2017-07-12 PROCEDURE — 86160 COMPLEMENT ANTIGEN: CPT | Performed by: INTERNAL MEDICINE

## 2017-07-12 PROCEDURE — 25010000002 METHYLPREDNISOLONE PER 40 MG: Performed by: INTERNAL MEDICINE

## 2017-07-12 PROCEDURE — 86431 RHEUMATOID FACTOR QUANT: CPT | Performed by: INTERNAL MEDICINE

## 2017-07-12 PROCEDURE — 82962 GLUCOSE BLOOD TEST: CPT

## 2017-07-12 PROCEDURE — 83520 IMMUNOASSAY QUANT NOS NONAB: CPT | Performed by: INTERNAL MEDICINE

## 2017-07-12 PROCEDURE — 63710000001 PREDNISONE PER 1 MG: Performed by: INTERNAL MEDICINE

## 2017-07-12 PROCEDURE — 86038 ANTINUCLEAR ANTIBODIES: CPT | Performed by: INTERNAL MEDICINE

## 2017-07-12 PROCEDURE — 25010000002 ENOXAPARIN PER 10 MG: Performed by: NURSE PRACTITIONER

## 2017-07-12 RX ORDER — AMIODARONE HYDROCHLORIDE 200 MG/1
200 TABLET ORAL DAILY
Status: DISCONTINUED | OUTPATIENT
Start: 2017-07-12 | End: 2017-07-13

## 2017-07-12 RX ORDER — PREDNISONE 20 MG/1
40 TABLET ORAL
Status: DISCONTINUED | OUTPATIENT
Start: 2017-07-12 | End: 2017-07-13 | Stop reason: HOSPADM

## 2017-07-12 RX ADMIN — DILTIAZEM HYDROCHLORIDE 5 MG/HR: 100 INJECTION, POWDER, LYOPHILIZED, FOR SOLUTION INTRAVENOUS at 07:37

## 2017-07-12 RX ADMIN — ASPIRIN 81 MG: 81 TABLET ORAL at 09:17

## 2017-07-12 RX ADMIN — ENOXAPARIN SODIUM 80 MG: 80 INJECTION SUBCUTANEOUS at 09:18

## 2017-07-12 RX ADMIN — AMIODARONE HYDROCHLORIDE 150 MG: 1.5 INJECTION, SOLUTION INTRAVENOUS at 12:18

## 2017-07-12 RX ADMIN — AMIODARONE HYDROCHLORIDE 200 MG: 200 TABLET ORAL at 12:18

## 2017-07-12 RX ADMIN — METOPROLOL TARTRATE 25 MG: 25 TABLET ORAL at 22:02

## 2017-07-12 RX ADMIN — ENOXAPARIN SODIUM 80 MG: 80 INJECTION SUBCUTANEOUS at 22:02

## 2017-07-12 RX ADMIN — METOPROLOL TARTRATE 25 MG: 25 TABLET ORAL at 13:25

## 2017-07-12 RX ADMIN — DULOXETINE HYDROCHLORIDE 30 MG: 30 CAPSULE, DELAYED RELEASE ORAL at 09:17

## 2017-07-12 RX ADMIN — PREDNISONE 40 MG: 20 TABLET ORAL at 15:09

## 2017-07-12 RX ADMIN — ATORVASTATIN CALCIUM 20 MG: 20 TABLET, FILM COATED ORAL at 17:27

## 2017-07-12 RX ADMIN — METHYLPREDNISOLONE SODIUM SUCCINATE 40 MG: 40 INJECTION, POWDER, FOR SOLUTION INTRAMUSCULAR; INTRAVENOUS at 09:18

## 2017-07-12 NOTE — PROGRESS NOTES
Electrophysiology Follow-Up Note      Patient Name: José Moody  Age/Sex: 72 y.o. male  : 1945  MRN: [id number]      Day of Service: 17       Chief Complaint/Follow-up: Palpitations/Shortness of breath/PAF      S: No chest pain, breathing some better. No palpitations.       Temp:  [97.5 °F (36.4 °C)-97.8 °F (36.6 °C)] 97.5 °F (36.4 °C)  Heart Rate:  [71-74] 71  Resp:  [16-18] 18  BP: (118-139)/(54-74) 139/74     PHYSICAL EXAM:    General Appearance: No acute distress, well developed and well nourished.   Eyes: Conjunctiva and lids: No erythema, swelling, or discharge. Sclera non-icteric.   HENT: Atraumatic, normocephalic. External eyes, ears, and nose normal. No hearing loss noted. Mucous membranes normal. Lips not cyanotic. Neck supple with no tenderness.  Respiratory: No signs of respiratory distress. Respiration rhythm and depth normal.   Clear to auscultation. No rales, crackles, rhonchi, or wheezing auscultated.   Cardiovascular:  Jugular Venous Pressure: Normal  Heart Rate and Rhythm: Normal, Heart Sounds: Normal S1 and S2. No S3 or S4 noted.  Murmurs: No murmurs noted. No rubs, thrills, or gallops.   Arterial Pulses: Posterior tibialis and dorsalis pedis pulses normal.   Lower Extremities: No edema noted.  Gastrointestinal:  Abdomen soft, non-distended, non-tender. Normal bowel sounds. No hepatomegaly.   Musculoskeletal: Normal movement of extremities  Skin and Nails: General appearance normal. No pallor, cyanosis, diaphoresis. Skin temperature normal. No clubbing of fingernails.   Psychiatric: Patient alert and oriented to person, place, and time. Speech and behavior appropriate. Normal mood and affect.       ECG/TELE:             Results from last 7 days  Lab Units 17  1008 17  0145 17  1333   SODIUM mmol/L 138 140 138   POTASSIUM mmol/L 3.7 3.6 4.1   CHLORIDE mmol/L 99 103 101   CO2 mmol/L 22.9 24.5 22.6   BUN mg/dL 28* 13 16   CREATININE mg/dL 1.18 0.89 1.08    GLUCOSE mg/dL 182* 121* 92   CALCIUM mg/dL 8.9 8.4* 9.4       Results from last 7 days  Lab Units 07/07/17  1333   WBC 10*3/mm3 10.01   HEMOGLOBIN g/dL 15.0   HEMATOCRIT % 44.4   PLATELETS 10*3/mm3 269           Results from last 7 days  Lab Units 07/08/17  0145 07/07/17  1333   TROPONIN T ng/mL <0.010 <0.010       Results from last 7 days  Lab Units 07/08/17  0145   TSH mIU/mL 6.340*       Current Medications:   Scheduled Meds:  amiodarone 200 mg Oral Daily   aspirin 81 mg Oral Daily   atorvastatin 20 mg Oral Q PM   DULoxetine 30 mg Oral Daily   enoxaparin 1 mg/kg Subcutaneous Q12H   metoprolol tartrate 25 mg Oral Q12H   predniSONE 40 mg Oral Daily With Breakfast     Continuous Infusions:  lactated ringers 1,000 mL Last Rate: 1,000 mL (07/08/17 1025)         Active Problems:    Bilateral pulmonary infiltrates on chest x-ray       Plan:     -PAF, has not had any for about the last 1 1/2 days-He has a history of PAF in the past. Low dose metoprolol started this morning and has tolerated okay to this point.   -Hx of CAD/CABG 2014 (Had post op PAF)  -Organizing pneumonia-currently on steroids per pulmonary, further studies pending  -Acute hypoxemic respiratory failure-on oxygen currently    Dr. Valdovinos discussed with Dr. Stallworth and Dr. Mosqueda. He had been placed back on amio due to patient concerned about palpitations and low dose beta blocker has not controlled them in the past. We discussed in detail with the patient that he is going to have palpitations and short bouts of PAF mostly likely until his pulmonary status improves. He says his longest episodes are 10 minutes in duration. We explained that we would not recommend amiodarone given his pulmonary status. We told him we would suggest the metoprolol and he could even take an extra dose during an episode if needed. He was agreeable. We did tell him that we would recommend oral anticoagulation but will leave that up to Dr. Mosqueda as he has dicussed  with him and the patient had decided against anticoagulation.     Amaya Cordoba, KORI  07/12/17  4:14 PM

## 2017-07-12 NOTE — PROGRESS NOTES
[facility]     Mar/chart reviewed  F/u pneumonia.  Still gets short of breath with exertion, oxygen drops. Hr goes up    Vital Sign Min/Max for last 24 hours  Temp  Min: 97.5 °F (36.4 °C)  Max: 97.8 °F (36.6 °C)   BP  Min: 118/54  Max: 136/71   Pulse  Min: 70  Max: 74   Resp  Min: 16  Max: 17   SpO2  Min: 93 %  Max: 95 %   Flow (L/min)  Min: 2  Max: 2   No Data Recorded     Nad, axox3,   perrl, eomi, no icterus,  mmm, no jvd, trachea midline, neck supple,  chest good ae bilaterally, + crackles, no wheezes,   rrr,   soft, nt, nd +bs,  no c/c/ e    Labs:  Reviewed    CXR: pretty much unchanged      A/P:  1. Organizing pneumonia -- continue prednisone; will send STACY, ANCA, RA, c3, c4, and anca panel  2. Acute hypoxemic respiratory failure -- was off oxygen yesterday  3. Pafib -- cardiology following -- off amio due to cncern for possible toxicity  4. CAD    Discussed with Dr. Valdovinos, will stop diltiazem drip and start metoprolol   Hopefully home tomorrow. Will need close pulmonary follow up.

## 2017-07-12 NOTE — PROGRESS NOTES
[facility]  Cardiology Progress Note    Patient Identification:  Name: José Moody  Age: 72 y.o.  Sex: male  :  1945  MRN: [id number]                 Follow Up / Chief Complaint: Atrial fibrillation    Interval History:  72-year-old male with known history of coronary artery disease, bypass graft surgery admitted with a respiratory insufficiency and developed atrial fibrillation, symptomatic approximately 2-3 days ago     Subjective:  Patient has been converted to normal sinus rhythm and remains free of any symptoms at this    Objective:    Past Medical History:  Past Medical History:   Diagnosis Date   • Anxiety    • Arthritis     LT HIP   • Atrial fibrillation    • Coronary artery disease    • High cholesterol    • History of atrial fibrillation     LAST HAD    • Limited joint range of motion     LT HIP   • PONV (postoperative nausea and vomiting)     AFTER TONSILLECTOMY     Past Surgical History:  Past Surgical History:   Procedure Laterality Date   • BRONCHOSCOPY Bilateral 2017    Procedure: BRONCHOSCOPY WITH BAL RIGHT AND LEFT UPPER LOBE;  Surgeon: Alin Stallworth MD;  Location: [facility] ENDOSCOPY;  Service:    • CARDIAC SURGERY     • COLONOSCOPY     • CORONARY ARTERY BYPASS GRAFT      3 VESSELS    • JOINT REPLACEMENT     • TONSILLECTOMY     • TOTAL HIP ARTHROPLASTY Left 3/9/2017    Procedure: LT TOTAL HIP ARTHROPLASTY;  Surgeon: José Machado MD;  Location: [facility] OR;  Service:         Social History:   Social History   Substance Use Topics   • Smoking status: Former Smoker     Packs/day: 0.50     Years: 27.00     Types: Cigarettes     Quit date:    • Smokeless tobacco: Never Used   • Alcohol use No      Family History:  Family History   Problem Relation Age of Onset   • Family history unknown: Yes          Allergies:  No Known Allergies  Scheduled Meds:    amiodarone 200 mg Daily   aspirin 81 mg Daily   atorvastatin 20 mg Q PM   DULoxetine 30  mg Daily   enoxaparin 1 mg/kg Q12H   metoprolol tartrate 25 mg Q12H   predniSONE 40 mg Daily With Breakfast           INTAKE AND OUTPUT:    Intake/Output Summary (Last 24 hours) at 07/12/17 1310  Last data filed at 07/12/17 0845   Gross per 24 hour   Intake           856.19 ml   Output              650 ml   Net           206.19 ml       Review of Systems:   GI:No nausea vomiting  Cardiac: No chest pains or tightness in chest  Pulmonary: No cough or the sputum    Constitutional:  Temp:  [97.5 °F (36.4 °C)-97.8 °F (36.6 °C)] 97.8 °F (36.6 °C)  Heart Rate:  [70-74] 74  Resp:  [16-17] 16  BP: (118-136)/(54-71) 136/71    Physical Exam by Tanisha Mosqueda MD  General:  Appears in no acute distress  Eyes: PERTL,  HEENT:  No JVD. Thyroid not visibly enlarged. No mucosal pallor or cyanosis  Respiratory: Respirations regular and unlabored at rest. BBS with good air entry in all fields. No crackles, rubs or wheezes auscultated  Cardiovascular: S1S2 Regular rate and rhythm. No murmur, rub or gallop. No carotid bruits. DP/PT pulses     . No pretibial pitting edema  Gastrointestinal: Abdomen soft, flat, non tender. Bowel sounds present. No hepatosplenomegaly. No ascites  Musculoskeletal: BYNUM x4. No abnormal movements  Extremities: No digital clubbing or cyanosis  Skin: Color pink. Skin warm and dry to touch. No rashes    Neuro: AAO x3 CN II-XII grossly intact  Psych: Mood and affect normal, pleasant and cooperative          Cardiographics  Telemetry:     ECG:     Echocardiogram:     Lab Review     Results from last 7 days  Lab Units 07/08/17  0145 07/07/17  1333   TROPONIN T ng/mL <0.010 <0.010       Results from last 7 days  Lab Units 07/11/17  1008   MAGNESIUM mg/dL 2.4       Results from last 7 days  Lab Units 07/11/17  1008   SODIUM mmol/L 138   POTASSIUM mmol/L 3.7   BUN mg/dL 28*   CREATININE mg/dL 1.18   CALCIUM mg/dL 8.9     @LABRCNTIPbnp@    Results from last 7 days  Lab Units 07/07/17  1333   WBC 10*3/mm3 10.01  "  HEMOGLOBIN g/dL 15.0   HEMATOCRIT % 44.4   PLATELETS 10*3/mm3 269             Assessment:  Recurrent paroxysmal atrial fibrillation  Coronary artery disease with a status post CABG  Respiratory insufficiency      Plan:  Had a long discussion multiple times with the patient and family, in the past low-dose beta blockers have always failed to control episodes of palpitation    Patient was on amiodarone in the past with no symptoms    Patient would like to go back on amiodarone    José Moody IS ON AMIODARONE,   Significant side effects associated with this medication has been explained     Pros and cons of the medications has been discussed, decision has been to continue the medication   6 months to yearly checkup for eye examination, thyroid function test, chest x-ray and liver function test has been advised    Patient understands well    At this point patient is not on chronic anticoagulation    Pros and cons of anticoagulation has been discussed    At this point it has been decided to stay away from chronic long-term anticoagulation    Labs/tests ordered for am      I reviewed the patient's new clinical results and treatment plan with Dr Mosqueda.  I personally viewed and interpreted the patient's EKG/Telemetry data    )7/12/2017  Tanisha Mosqueda MD      [facility] Dragon/Transcription:   \"Dictated utilizing Dragon dictation\".     "

## 2017-07-12 NOTE — PLAN OF CARE
Problem: Patient Care Overview (Adult)  Goal: Plan of Care Review  Outcome: Ongoing (interventions implemented as appropriate)    07/12/17 1711   Coping/Psychosocial Response Interventions   Plan Of Care Reviewed With patient   Patient Care Overview   Progress improving   Outcome Evaluation   Outcome Summary/Follow up Plan Discontinued cardizem drip.Pt. given Amnio.bolus and started on PO Amnio. Also started metoprolol.and converted medrol IV to Po. Pt. for probable discharge in am. Tolerating meals. Up ad-gloria. Will continue to monitor heartrate. 07/12/17         Problem: Respiratory Insufficiency (Adult)  Goal: Acid/Base Balance  Outcome: Ongoing (interventions implemented as appropriate)  Goal: Effective Ventilation  Outcome: Ongoing (interventions implemented as appropriate)    Problem: Activity Intolerance (Adult)  Goal: Activity Tolerance  Outcome: Ongoing (interventions implemented as appropriate)  Goal: Effective Energy Conservation Techniques  Outcome: Ongoing (interventions implemented as appropriate)

## 2017-07-13 VITALS
HEIGHT: 66 IN | SYSTOLIC BLOOD PRESSURE: 140 MMHG | DIASTOLIC BLOOD PRESSURE: 85 MMHG | HEART RATE: 89 BPM | TEMPERATURE: 97.7 F | OXYGEN SATURATION: 93 % | WEIGHT: 187 LBS | BODY MASS INDEX: 30.05 KG/M2 | RESPIRATION RATE: 18 BRPM

## 2017-07-13 LAB
CYTO UR: NORMAL
LAB AP CASE REPORT: NORMAL
LAB AP CLINICAL INFORMATION: NORMAL
Lab: NORMAL
PATH REPORT.ADDENDUM SPEC: NORMAL
PATH REPORT.FINAL DX SPEC: NORMAL
PATH REPORT.GROSS SPEC: NORMAL
PLATELET # BLD AUTO: 281 10*3/MM3 (ref 140–500)

## 2017-07-13 PROCEDURE — 99231 SBSQ HOSP IP/OBS SF/LOW 25: CPT | Performed by: INTERNAL MEDICINE

## 2017-07-13 PROCEDURE — 94620 HC PULMONARY STRESS TEST SIMPLE: CPT

## 2017-07-13 PROCEDURE — 93227 XTRNL ECG REC<48 HR R&I: CPT | Performed by: INTERNAL MEDICINE

## 2017-07-13 PROCEDURE — 85049 AUTOMATED PLATELET COUNT: CPT | Performed by: INTERNAL MEDICINE

## 2017-07-13 PROCEDURE — 63710000001 PREDNISONE PER 1 MG: Performed by: INTERNAL MEDICINE

## 2017-07-13 RX ADMIN — METOPROLOL TARTRATE 25 MG: 25 TABLET ORAL at 09:09

## 2017-07-13 RX ADMIN — ASPIRIN 81 MG: 81 TABLET ORAL at 09:00

## 2017-07-13 RX ADMIN — DULOXETINE HYDROCHLORIDE 30 MG: 30 CAPSULE, DELAYED RELEASE ORAL at 09:00

## 2017-07-13 RX ADMIN — PREDNISONE 40 MG: 20 TABLET ORAL at 09:00

## 2017-07-13 NOTE — PROGRESS NOTES
Exercise Oximetry    Patient Name:José Moody   MRN: [id number]   Date: 07/13/17             ROOM AIR BASELINE   SpO2% 92   Heart Rate 72   Blood Pressure      EXERCISE ON ROOM AIR SpO2% EXERCISE ON O2 @  LPM SpO2%   1 MINUTE 91 1 MINUTE    2 MINUTES 91 2 MINUTES    3 MINUTES 90 3 MINUTES    4 MINUTES 89 4 MINUTES    5 MINUTES 90 5 MINUTES    6 MINUTES 89 6 MINUTES               Distance Walked   Distance Walked   Dyspnea (Doc Scale)   Dyspnea (Doc Scale)   Fatigue (Doc Scale)   Fatigue (Doc Scale)   SpO2% Post Exercise  89 SpO2% Post Exercise   HR Post Exercise  79 HR Post Exercise   Time to Recovery   Time to Recovery     Comments: Pt completed 6 min walk on Ra. sats were 89-91% patient had no dyspnea during or after walk. Pt does not need home o2.

## 2017-07-13 NOTE — PLAN OF CARE
Problem: Patient Care Overview (Adult)  Goal: Plan of Care Review  Outcome: Outcome(s) achieved Date Met:  07/13/17 07/13/17 1115   Coping/Psychosocial Response Interventions   Plan Of Care Reviewed With patient;daughter   Patient Care Overview   Progress improving   Outcome Evaluation   Outcome Summary/Follow up Plan Pt. stable VS . Will discharge to home with oxygen at 2L. Walking oximeter 89-91%. Appetite good. Provided discharge instruction and education sheets to pt and dtr. Pt. discharge to home.         Problem: Respiratory Insufficiency (Adult)  Goal: Acid/Base Balance  Outcome: Outcome(s) achieved Date Met:  07/13/17  Goal: Effective Ventilation  Outcome: Outcome(s) achieved Date Met:  07/13/17    Problem: Activity Intolerance (Adult)  Goal: Activity Tolerance  Outcome: Outcome(s) achieved Date Met:  07/13/17  Goal: Effective Energy Conservation Techniques  Outcome: Outcome(s) achieved Date Met:  07/13/17

## 2017-07-13 NOTE — PROGRESS NOTES
Continued Stay Note  [facility]     Patient Name: José Moody  MRN: [id number]  Today's Date: 7/13/2017    Admit Date: 7/7/2017          Discharge Plan       07/13/17 1743    Case Management/Social Work Plan    Plan      Final Note    Final Note Discharged home with Home 02 from [facility]. Kody CARDOZA              Discharge Codes     None        Expected Discharge Date and Time     Expected Discharge Date Expected Discharge Time    Jul 13, 2017             Shama Mcclure, RN

## 2017-07-13 NOTE — PROGRESS NOTES
[facility]  Cardiology Progress Note    Patient Identification:  Name: José Moody  Age: 72 y.o.  Sex: male  :  1945  MRN: [id number]                 Follow Up / Chief Complaint: Atrial fibrillation    Interval History:  72-year-old male with known history of coronary artery disease, bypass graft surgery admitted with a respiratory insufficiency and developed atrial fibrillation, symptomatic approximately 2-3 days ago     Subjective:  Patient has been converted to normal sinus rhythm and remains free of any symptoms at this    Objective:    Past Medical History:  Past Medical History:   Diagnosis Date   • Anxiety    • Arthritis     LT HIP   • Atrial fibrillation    • Coronary artery disease    • High cholesterol    • History of atrial fibrillation     LAST HAD    • Limited joint range of motion     LT HIP   • PONV (postoperative nausea and vomiting)     AFTER TONSILLECTOMY     Past Surgical History:  Past Surgical History:   Procedure Laterality Date   • BRONCHOSCOPY Bilateral 2017    Procedure: BRONCHOSCOPY WITH BAL RIGHT AND LEFT UPPER LOBE;  Surgeon: Alin Stallworth MD;  Location: [facility] ENDOSCOPY;  Service:    • CARDIAC SURGERY     • COLONOSCOPY     • CORONARY ARTERY BYPASS GRAFT      3 VESSELS    • JOINT REPLACEMENT     • TONSILLECTOMY     • TOTAL HIP ARTHROPLASTY Left 3/9/2017    Procedure: LT TOTAL HIP ARTHROPLASTY;  Surgeon: José Machado MD;  Location: [facility] OR;  Service:         Social History:   Social History   Substance Use Topics   • Smoking status: Former Smoker     Packs/day: 0.50     Years: 27.00     Types: Cigarettes     Quit date:    • Smokeless tobacco: Never Used   • Alcohol use No      Family History:  Family History   Problem Relation Age of Onset   • Family history unknown: Yes          Allergies:  No Known Allergies  Scheduled Meds:    aspirin 81 mg Daily   atorvastatin 20 mg Q PM   DULoxetine 30 mg Daily   enoxaparin 1  mg/kg Q12H   metoprolol tartrate 25 mg Q12H   predniSONE 40 mg Daily With Breakfast           INTAKE AND OUTPUT:    Intake/Output Summary (Last 24 hours) at 07/13/17 1014  Last data filed at 07/12/17 1703   Gross per 24 hour   Intake              600 ml   Output                0 ml   Net              600 ml       Review of Systems:   GI:No nausea vomiting  Cardiac: No chest pains or tightness in chest  Pulmonary: No cough or the sputum    Constitutional:  Temp:  [97.5 °F (36.4 °C)-98.4 °F (36.9 °C)] 97.7 °F (36.5 °C)  Heart Rate:  [71-89] 89  Resp:  [18-20] 18  BP: (139-152)/(74-85) 140/85    Physical Exam by Tanisha Mosqueda MD  General:  Appears in no acute distress  Eyes: PERTL,  HEENT:  No JVD. Thyroid not visibly enlarged. No mucosal pallor or cyanosis  Respiratory: Respirations regular and unlabored at rest. BBS with good air entry in all fields. No crackles, rubs or wheezes auscultated  Cardiovascular: S1S2 Regular rate and rhythm. No murmur, rub or gallop. No carotid bruits. DP/PT pulses     . No pretibial pitting edema  Gastrointestinal: Abdomen soft, flat, non tender. Bowel sounds present. No hepatosplenomegaly. No ascites  Musculoskeletal: BYNUM x4. No abnormal movements  Extremities: No digital clubbing or cyanosis  Skin: Color pink. Skin warm and dry to touch. No rashes    Neuro: AAO x3 CN II-XII grossly intact  Psych: Mood and affect normal, pleasant and cooperative          Cardiographics  Telemetry:     ECG:     Echocardiogram:     Lab Review     Results from last 7 days  Lab Units 07/08/17  0145 07/07/17  1333   TROPONIN T ng/mL <0.010 <0.010       Results from last 7 days  Lab Units 07/11/17  1008   MAGNESIUM mg/dL 2.4       Results from last 7 days  Lab Units 07/11/17  1008   SODIUM mmol/L 138   POTASSIUM mmol/L 3.7   BUN mg/dL 28*   CREATININE mg/dL 1.18   CALCIUM mg/dL 8.9     @LABRCNTIPbnp@    Results from last 7 days  Lab Units 07/13/17  0729 07/07/17  1333   WBC 10*3/mm3  --  10.01  "  HEMOGLOBIN g/dL  --  15.0   HEMATOCRIT %  --  44.4   PLATELETS 10*3/mm3 602 205             Assessment:  Recurrent paroxysmal atrial fibrillation  Coronary artery disease with a status post CABG  Respiratory insufficiency      Plan:  Had a long discussion multiple times with the patient and family, in the past low-dose beta blockers have always failed to control episodes of palpitation    Patient was on amiodarone in the past with no symptoms    Patient would like to go back on amiodarone    José Moody IS ON AMIODARONE,   Significant side effects associated with this medication has been explained     Pros and cons of the medications has been discussed, decision has been to continue the medication   6 months to yearly checkup for eye examination, thyroid function test, chest x-ray and liver function test has been advised    Patient understands well    At this point patient is not on chronic anticoagulation    Pros and cons of anticoagulation has been discussed    At this point it has been decided to stay away from chronic long-term anticoagulation    Labs/tests ordered for am      I reviewed the patient's new clinical results and treatment plan with Dr Mosqueda.  I personally viewed and interpreted the patient's EKG/Telemetry data    )7/13/2017  Tanisha Mosqueda MD      [facility] Dragon/Transcription:   \"Dictated utilizing Dragon dictation\".     "

## 2017-07-14 LAB
ANA SER QL: POSITIVE
C-ANCA TITR SER IF: NORMAL TITER
C3 SERPL-MCNC: 149 MG/DL (ref 82–167)
C4 SERPL-MCNC: 25 MG/DL (ref 14–44)
MYELOPEROXIDASE AB SER-ACNC: <9 U/ML (ref 0–9)
P-ANCA ATYPICAL TITR SER IF: NORMAL TITER
P-ANCA TITR SER IF: NORMAL TITER
PROTEINASE3 AB SER IA-ACNC: <3.5 U/ML (ref 0–3.5)
RA LATEX TURBID: <10 IU/ML (ref 0–13.9)

## 2017-07-17 ENCOUNTER — OFFICE VISIT (OUTPATIENT)
Dept: CARDIOLOGY | Facility: CLINIC | Age: 72
End: 2017-07-17

## 2017-07-17 VITALS
HEIGHT: 66 IN | SYSTOLIC BLOOD PRESSURE: 130 MMHG | HEART RATE: 82 BPM | BODY MASS INDEX: 31.02 KG/M2 | DIASTOLIC BLOOD PRESSURE: 86 MMHG | WEIGHT: 193 LBS

## 2017-07-17 DIAGNOSIS — I48.91 ATRIAL FIBRILLATION, UNSPECIFIED TYPE (HCC): ICD-10-CM

## 2017-07-17 DIAGNOSIS — Z95.1 S/P CABG (CORONARY ARTERY BYPASS GRAFT): Primary | ICD-10-CM

## 2017-07-17 DIAGNOSIS — I25.10 CORONARY ARTERY DISEASE INVOLVING NATIVE HEART WITHOUT ANGINA PECTORIS, UNSPECIFIED VESSEL OR LESION TYPE: ICD-10-CM

## 2017-07-17 DIAGNOSIS — R00.2 PALPITATIONS: ICD-10-CM

## 2017-07-17 PROCEDURE — 93000 ELECTROCARDIOGRAM COMPLETE: CPT | Performed by: INTERNAL MEDICINE

## 2017-07-17 PROCEDURE — 99214 OFFICE O/P EST MOD 30 MIN: CPT | Performed by: INTERNAL MEDICINE

## 2017-07-17 RX ORDER — METOPROLOL TARTRATE 50 MG/1
50 TABLET, FILM COATED ORAL EVERY 12 HOURS SCHEDULED
Qty: 180 TABLET | Refills: 3 | Status: SHIPPED | OUTPATIENT
Start: 2017-07-17 | End: 2018-07-02 | Stop reason: SDUPTHER

## 2017-07-17 NOTE — PROGRESS NOTES
Subjective:       José Moody is a 72 y.o. male who here for follow up    CC  Follow-up after the recent hospitalization  HPI  72-year-old white male with known history of atrial fibrillation, palpitation coronary artery bypass graft surgery here for the follow-up since hospitalization patient had one episode of atrial fibrillation lasting for 1 minute    Patient has significant pulmonary infection recently     Problem List Items Addressed This Visit        Cardiovascular and Mediastinum    Atrial fibrillation    Relevant Medications    metoprolol tartrate (LOPRESSOR) 50 MG tablet    Palpitations       Other    S/P CABG (coronary artery bypass graft) - Primary    Relevant Orders    ECG 12 Lead      Other Visit Diagnoses     Coronary artery disease involving native heart without angina pectoris, unspecified vessel or lesion type            .    The following portions of the patient's history were reviewed and updated as appropriate: allergies, current medications, past family history, past medical history, past social history, past surgical history and problem list.    Past Medical History:   Diagnosis Date   • Anxiety    • Arthritis     LT HIP   • Atrial fibrillation    • Coronary artery disease    • High cholesterol    • History of atrial fibrillation     LAST HAD 1-2016   • Limited joint range of motion     LT HIP   • PONV (postoperative nausea and vomiting)     AFTER TONSILLECTOMY    reports that he quit smoking about 16 years ago. His smoking use included Cigarettes. He has a 13.50 pack-year smoking history. He has never used smokeless tobacco. He reports that he does not drink alcohol or use illicit drugs.  Family History   Problem Relation Age of Onset   • Family history unknown: Yes       Review of Systems  Constitutional: No wt loss, fever, fatigue  Gastrointestinal: No nausea, abdominal pain  Behavioral/Psych: No insomnia or anxiety   Cardiovascular No chest pains or tightness in chest  Objective:     "   Physical Exam             Physical Exam  /86  Pulse 82  Ht 66\" (167.6 cm)  Wt 193 lb (87.5 kg)  BMI 31.15 kg/m2    General appearance: NAD, conversant   Eyes: anicteric sclerae, moist conjunctivae; no lid-lag; PERRLA   HENT: Atraumatic; oropharynx clear with moist mucous membranes and no mucosal ulcerations;  normal hard and soft palate   Neck: Trachea midline; FROM, supple, no thyromegaly or lymphadenopathy   Lungs: CTA, with normal respiratory effort and no intercostal retractions   CV: S1-S2 regular, no murmurs, no rub, no gallop   Abdomen: Soft, non-tender; no masses or HSM   Extremities: No peripheral edema or extremity lymphadenopathy  Skin: Normal temperature, turgor and texture; no rash, ulcers or subcutaneous nodules   Psych: Appropriate affect, alert and oriented to person, place and time           Cardiographics  @  ECG 12 Lead  Date/Time: 7/17/2017 3:25 PM  Performed by: EDGARDO BUCKLEY  Authorized by: EDGARDO BUCKLEY   Comparison: compared with previous ECG   Similar to previous ECG  Rhythm: sinus rhythm  Other: no other findings  Clinical impression: normal ECG            Echocardiogram:        Current Outpatient Prescriptions:   •  aspirin 81 MG EC tablet, Take 1 tablet by mouth Daily., Disp: 30 tablet, Rfl: 0  •  atorvastatin (LIPITOR) 20 MG tablet, Take 20 mg by mouth Every Evening., Disp: , Rfl:   •  DULoxetine (CYMBALTA) 30 MG capsule, Take 30 mg by mouth Daily., Disp: , Rfl:   •  ibuprofen (ADVIL,MOTRIN) 400 MG tablet, Take 400 mg by mouth Every 6 (Six) Hours As Needed for mild pain (1-3)., Disp: , Rfl:   •  metoprolol tartrate (LOPRESSOR) 25 MG tablet, Take 1 tablet by mouth Every 12 (Twelve) Hours., Disp: 60 tablet, Rfl: 0  •  predniSONE (DELTASONE) 20 MG tablet, Take 2 tablets by mouth Daily., Disp: 60 tablet, Rfl: 0   Assessment:        Patient Active Problem List   Diagnosis   • Abnormal thallium stress test   • Atrial fibrillation   • S/P CABG (coronary artery " bypass graft)   • Chest pain   • Hyperlipidemia   • Shortness of breath   • Weak pulse   • SOB (shortness of breath)   • Chronic coronary artery disease   • Degenerative joint disease (DJD) of hip   • Palpitations   • Bilateral pulmonary infiltrates on chest x-ray               Plan:            ICD-10-CM ICD-9-CM   1. S/P CABG (coronary artery bypass graft) Z95.1 V45.81   2. Atrial fibrillation, unspecified type I48.91 427.31   3. Coronary artery disease involving native heart without angina pectoris, unspecified vessel or lesion type I25.10 414.01   4. Palpitations R00.2 785.1     1. S/P CABG (coronary artery bypass graft)  Stable, no angina  - ECG 12 Lead    2. Atrial fibrillation, unspecified type  Now in normal sinus rhythm    3. Coronary artery disease involving native heart without angina pectoris, unspecified vessel or lesion type  Stable    4. Palpitations  One episode of atrial fibrillation    There has been a long discussion with the patient should be on amiodarone are not, at this point she will be off of the amiodarone unless the symptoms are significant     INCREASE METOPROLOL 50 MG PO BID    SEE IN 1 MONTH    COUNSELING:    José Chavez was given to patient for the following topics: diagnostic results, risk factor reductions, impressions, risks and benefits of treatment options and importance of treatment compliance .       SMOKING COUNSELING:    Counseling given: Not Answered      EM[facility] Dragon/Transcription disclaimer:   Much of this encounter note is an electronic transcription/translation of spoken language to printed text. The electronic translation of spoken language may permit erroneous, or at times, nonsensical words or phrases to be inadvertently transcribed; Although I have reviewed the note for such errors, some may still exist.

## 2017-08-05 LAB
FUNGUS WND CULT: NORMAL
FUNGUS WND CULT: NORMAL

## 2017-08-07 ENCOUNTER — TRANSCRIBE ORDERS (OUTPATIENT)
Dept: PULMONOLOGY | Facility: HOSPITAL | Age: 72
End: 2017-08-07

## 2017-08-07 DIAGNOSIS — R91.8 PULMONARY INFILTRATE: Primary | ICD-10-CM

## 2017-08-19 LAB
MYCOBACTERIUM SPEC CULT: NORMAL
MYCOBACTERIUM SPEC CULT: NORMAL
NIGHT BLUE STAIN TISS: NORMAL
NIGHT BLUE STAIN TISS: NORMAL

## 2017-08-21 ENCOUNTER — OFFICE VISIT (OUTPATIENT)
Dept: CARDIOLOGY | Facility: CLINIC | Age: 72
End: 2017-08-21

## 2017-08-21 VITALS
DIASTOLIC BLOOD PRESSURE: 80 MMHG | HEART RATE: 69 BPM | SYSTOLIC BLOOD PRESSURE: 130 MMHG | WEIGHT: 195 LBS | BODY MASS INDEX: 31.34 KG/M2 | HEIGHT: 66 IN

## 2017-08-21 DIAGNOSIS — R00.2 PALPITATIONS: ICD-10-CM

## 2017-08-21 DIAGNOSIS — E78.5 HYPERLIPIDEMIA, UNSPECIFIED HYPERLIPIDEMIA TYPE: ICD-10-CM

## 2017-08-21 DIAGNOSIS — I48.0 PAROXYSMAL ATRIAL FIBRILLATION (HCC): Primary | ICD-10-CM

## 2017-08-21 DIAGNOSIS — I25.10 CHRONIC CORONARY ARTERY DISEASE: ICD-10-CM

## 2017-08-21 DIAGNOSIS — R06.02 SHORTNESS OF BREATH: ICD-10-CM

## 2017-08-21 PROCEDURE — 93000 ELECTROCARDIOGRAM COMPLETE: CPT | Performed by: INTERNAL MEDICINE

## 2017-08-21 PROCEDURE — 99213 OFFICE O/P EST LOW 20 MIN: CPT | Performed by: INTERNAL MEDICINE

## 2017-08-21 NOTE — PROGRESS NOTES
" Subjective:       José Moody is a 72 y.o. male who here for follow up    CC  Follow-up for the coronary artery disease  HPI  72-year-old white male with known history of atrial fibrillation, hyperlipidemia, coronary artery disease palpitation and shortness of breath here for the follow-up has been doing well with no complaints of chest pains or tightness in chest no heaviness with a pressure sensation no syncopal near-syncopal episode     Problem List Items Addressed This Visit        Cardiovascular and Mediastinum    Atrial fibrillation - Primary    Relevant Orders    ECG 12 Lead    Hyperlipidemia    Chronic coronary artery disease    Palpitations       Respiratory    Shortness of breath        .    The following portions of the patient's history were reviewed and updated as appropriate: allergies, current medications, past family history, past medical history, past social history, past surgical history and problem list.    Past Medical History:   Diagnosis Date   • Anxiety    • Arthritis     LT HIP   • Atrial fibrillation    • Coronary artery disease    • High cholesterol    • History of atrial fibrillation     LAST HAD 1-2016   • Limited joint range of motion     LT HIP   • PONV (postoperative nausea and vomiting)     AFTER TONSILLECTOMY    reports that he quit smoking about 16 years ago. His smoking use included Cigarettes. He has a 13.50 pack-year smoking history. He has never used smokeless tobacco. He reports that he does not drink alcohol or use illicit drugs.  Family History   Problem Relation Age of Onset   • Family history unknown: Yes       Review of Systems  Constitutional: No wt loss, fever, fatigue  Gastrointestinal: No nausea, abdominal pain  Behavioral/Psych: No insomnia or anxiety   Cardiovascular No chest pains or tightness in chest  Objective:       Physical Exam             Physical Exam  /80  Pulse 69  Ht 66\" (167.6 cm)  Wt 195 lb (88.5 kg)  BMI 31.47 kg/m2    General " appearance: NAD, conversant   Eyes: anicteric sclerae, moist conjunctivae; no lid-lag; PERRLA   HENT: Atraumatic; oropharynx clear with moist mucous membranes and no mucosal ulcerations;  normal hard and soft palate   Neck: Trachea midline; FROM, supple, no thyromegaly or lymphadenopathy   Lungs: CTA, with normal respiratory effort and no intercostal retractions   CV: S1-S2 regular, no murmurs, no rub, no gallop   Abdomen: Soft, non-tender; no masses or HSM   Extremities: No peripheral edema or extremity lymphadenopathy  Skin: Normal temperature, turgor and texture; no rash, ulcers or subcutaneous nodules   Psych: Appropriate affect, alert and oriented to person, place and time           Cardiographics  @  ECG 12 Lead  Date/Time: 8/21/2017 2:01 PM  Performed by: EDGARDO BUCKLEY  Authorized by: EDGARDO BUCKLEY   Comparison: not compared with previous ECG   Previous ECG: no previous ECG available  Rhythm: sinus rhythm  ST Flattening: all  Clinical impression: normal ECG            Echocardiogram:        Current Outpatient Prescriptions:   •  aspirin 81 MG EC tablet, Take 1 tablet by mouth Daily., Disp: 30 tablet, Rfl: 0  •  atorvastatin (LIPITOR) 20 MG tablet, Take 20 mg by mouth Every Evening., Disp: , Rfl:   •  DULoxetine (CYMBALTA) 30 MG capsule, Take 60 mg by mouth Daily., Disp: , Rfl:   •  ibuprofen (ADVIL,MOTRIN) 400 MG tablet, Take 400 mg by mouth Every 6 (Six) Hours As Needed for mild pain (1-3)., Disp: , Rfl:   •  metoprolol tartrate (LOPRESSOR) 50 MG tablet, Take 1 tablet by mouth Every 12 (Twelve) Hours., Disp: 180 tablet, Rfl: 3   Assessment:        Patient Active Problem List   Diagnosis   • Abnormal thallium stress test   • Atrial fibrillation   • S/P CABG (coronary artery bypass graft)   • Chest pain   • Hyperlipidemia   • Shortness of breath   • Weak pulse   • SOB (shortness of breath)   • Chronic coronary artery disease   • Degenerative joint disease (DJD) of hip   • Palpitations   •  Bilateral pulmonary infiltrates on chest x-ray               Plan:            ICD-10-CM ICD-9-CM   1. Paroxysmal atrial fibrillation I48.0 427.31   2. Hyperlipidemia, unspecified hyperlipidemia type E78.5 272.4   3. Palpitations R00.2 785.1   4. Shortness of breath R06.02 786.05     1. Paroxysmal atrial fibrillation  Normal sinus rhythm    2. Hyperlipidemia, unspecified hyperlipidemia type  Risk of the hyperlipidemia, importance of the treatment has been explained    Pros and cons of the statins has been explained    Regular blood workup as well as side effects including the liver failure, myelopathy death has been explained          3. Palpitations  Well-controlled with current medications    4. Shortness of breath  Under control  cv stable    See us 1 yr  COUNSELING:    José Chavez was given to patient for the following topics: diagnostic results, risk factor reductions, impressions, risks and benefits of treatment options and importance of treatment compliance .       SMOKING COUNSELING:    Counseling given: Not Answered      EMR Dragon/Transcription disclaimer:   Much of this encounter note is an electronic transcription/translation of spoken language to printed text. The electronic translation of spoken language may permit erroneous, or at times, nonsensical words or phrases to be inadvertently transcribed; Although I have reviewed the note for such errors, some may still exist.

## 2017-09-05 ENCOUNTER — HOSPITAL ENCOUNTER (OUTPATIENT)
Dept: CT IMAGING | Facility: HOSPITAL | Age: 72
Discharge: HOME | End: 2017-09-05
Attending: INTERNAL MEDICINE | Admitting: INTERNAL MEDICINE

## 2017-09-05 DIAGNOSIS — R91.8 PULMONARY INFILTRATE: ICD-10-CM

## 2017-09-05 PROCEDURE — 71250 CT THORAX DX C-: CPT

## 2018-03-07 ENCOUNTER — TRANSCRIBE ORDERS (OUTPATIENT)
Dept: ADMINISTRATIVE | Facility: HOSPITAL | Age: 73
End: 2018-03-07

## 2018-03-07 DIAGNOSIS — M25.552 HIP JOINT PAINFUL ON MOVEMENT, LEFT: Primary | ICD-10-CM

## 2018-03-07 DIAGNOSIS — Z96.643 HISTORY OF TOTAL HIP REPLACEMENT, BILATERAL: ICD-10-CM

## 2018-03-16 ENCOUNTER — HOSPITAL ENCOUNTER (OUTPATIENT)
Dept: NUCLEAR MEDICINE | Facility: HOSPITAL | Age: 73
Discharge: HOME | End: 2018-03-16
Attending: ORTHOPAEDIC SURGERY

## 2018-03-16 DIAGNOSIS — M25.552 HIP JOINT PAINFUL ON MOVEMENT, LEFT: ICD-10-CM

## 2018-03-16 DIAGNOSIS — Z96.643 HISTORY OF TOTAL HIP REPLACEMENT, BILATERAL: ICD-10-CM

## 2018-03-16 PROCEDURE — 78306 BONE IMAGING WHOLE BODY: CPT

## 2018-03-16 PROCEDURE — A9503 TC99M MEDRONATE: HCPCS | Performed by: ORTHOPAEDIC SURGERY

## 2018-03-16 PROCEDURE — 0 TECHNETIUM MEDRONATE KIT: Performed by: ORTHOPAEDIC SURGERY

## 2018-03-16 RX ORDER — TC 99M MEDRONATE 20 MG/10ML
21 INJECTION, POWDER, LYOPHILIZED, FOR SOLUTION INTRAVENOUS
Status: COMPLETED | OUTPATIENT
Start: 2018-03-16 | End: 2018-03-16

## 2018-03-16 RX ADMIN — Medication 21 MILLICURIE: at 09:00

## 2018-04-16 ENCOUNTER — APPOINTMENT (OUTPATIENT)
Dept: PREADMISSION TESTING | Facility: HOSPITAL | Age: 73
End: 2018-04-16

## 2018-04-16 ENCOUNTER — HOSPITAL ENCOUNTER (OUTPATIENT)
Dept: GENERAL RADIOLOGY | Facility: HOSPITAL | Age: 73
Discharge: HOME | End: 2018-04-16

## 2018-04-16 ENCOUNTER — HOSPITAL ENCOUNTER (OUTPATIENT)
Dept: GENERAL RADIOLOGY | Facility: HOSPITAL | Age: 73
Discharge: HOME | End: 2018-04-16
Admitting: ORTHOPAEDIC SURGERY

## 2018-04-16 VITALS
BODY MASS INDEX: 37.89 KG/M2 | SYSTOLIC BLOOD PRESSURE: 119 MMHG | RESPIRATION RATE: 16 BRPM | TEMPERATURE: 97.5 F | HEART RATE: 66 BPM | OXYGEN SATURATION: 98 % | HEIGHT: 60 IN | WEIGHT: 193 LBS | DIASTOLIC BLOOD PRESSURE: 70 MMHG

## 2018-04-16 LAB
ABO GROUP BLD: NORMAL
ALBUMIN SERPL-MCNC: 4.1 G/DL (ref 3.5–5.2)
ALBUMIN/GLOB SERPL: 1.3 G/DL
ALP SERPL-CCNC: 95 U/L (ref 39–117)
ALT SERPL W P-5'-P-CCNC: 18 U/L (ref 1–41)
ANION GAP SERPL CALCULATED.3IONS-SCNC: 12.9 MMOL/L
APTT PPP: 25.7 SECONDS (ref 22.7–35.4)
AST SERPL-CCNC: 17 U/L (ref 1–40)
BASOPHILS # BLD AUTO: 0.05 10*3/MM3 (ref 0–0.2)
BASOPHILS NFR BLD AUTO: 0.7 % (ref 0–1.5)
BILIRUB SERPL-MCNC: 0.5 MG/DL (ref 0.1–1.2)
BILIRUB UR QL STRIP: NEGATIVE
BLD GP AB SCN SERPL QL: NEGATIVE
BUN BLD-MCNC: 12 MG/DL (ref 8–23)
BUN/CREAT SERPL: 14.1 (ref 7–25)
CALCIUM SPEC-SCNC: 9.1 MG/DL (ref 8.6–10.5)
CHLORIDE SERPL-SCNC: 102 MMOL/L (ref 98–107)
CLARITY UR: CLEAR
CO2 SERPL-SCNC: 25.1 MMOL/L (ref 22–29)
COLOR UR: YELLOW
CREAT BLD-MCNC: 0.85 MG/DL (ref 0.76–1.27)
DEPRECATED RDW RBC AUTO: 50.6 FL (ref 37–54)
EOSINOPHIL # BLD AUTO: 0.27 10*3/MM3 (ref 0–0.7)
EOSINOPHIL NFR BLD AUTO: 3.7 % (ref 0.3–6.2)
ERYTHROCYTE [DISTWIDTH] IN BLOOD BY AUTOMATED COUNT: 15.8 % (ref 11.5–14.5)
GFR SERPL CREATININE-BSD FRML MDRD: 89 ML/MIN/1.73
GLOBULIN UR ELPH-MCNC: 3.1 GM/DL
GLUCOSE BLD-MCNC: 99 MG/DL (ref 65–99)
GLUCOSE UR STRIP-MCNC: NEGATIVE MG/DL
HCT VFR BLD AUTO: 44.1 % (ref 40.4–52.2)
HGB BLD-MCNC: 14.7 G/DL (ref 13.7–17.6)
HGB UR QL STRIP.AUTO: NEGATIVE
IMM GRANULOCYTES # BLD: 0.02 10*3/MM3 (ref 0–0.03)
IMM GRANULOCYTES NFR BLD: 0.3 % (ref 0–0.5)
INR PPP: 0.96 (ref 0.9–1.1)
KETONES UR QL STRIP: NEGATIVE
LEUKOCYTE ESTERASE UR QL STRIP.AUTO: NEGATIVE
LYMPHOCYTES # BLD AUTO: 2.45 10*3/MM3 (ref 0.9–4.8)
LYMPHOCYTES NFR BLD AUTO: 33.7 % (ref 19.6–45.3)
MCH RBC QN AUTO: 29.2 PG (ref 27–32.7)
MCHC RBC AUTO-ENTMCNC: 33.3 G/DL (ref 32.6–36.4)
MCV RBC AUTO: 87.5 FL (ref 79.8–96.2)
MONOCYTES # BLD AUTO: 0.83 10*3/MM3 (ref 0.2–1.2)
MONOCYTES NFR BLD AUTO: 11.4 % (ref 5–12)
NEUTROPHILS # BLD AUTO: 3.64 10*3/MM3 (ref 1.9–8.1)
NEUTROPHILS NFR BLD AUTO: 50.2 % (ref 42.7–76)
NITRITE UR QL STRIP: NEGATIVE
PH UR STRIP.AUTO: 6 [PH] (ref 5–8)
PLATELET # BLD AUTO: 203 10*3/MM3 (ref 140–500)
PMV BLD AUTO: 8.2 FL (ref 6–12)
POTASSIUM BLD-SCNC: 4.2 MMOL/L (ref 3.5–5.2)
PROT SERPL-MCNC: 7.2 G/DL (ref 6–8.5)
PROT UR QL STRIP: NEGATIVE
PROTHROMBIN TIME: 12.6 SECONDS (ref 11.7–14.2)
RBC # BLD AUTO: 5.04 10*6/MM3 (ref 4.6–6)
RH BLD: POSITIVE
SODIUM BLD-SCNC: 140 MMOL/L (ref 136–145)
SP GR UR STRIP: 1.01 (ref 1–1.03)
T&S EXPIRATION DATE: NORMAL
UROBILINOGEN UR QL STRIP: NORMAL
WBC NRBC COR # BLD: 7.26 10*3/MM3 (ref 4.5–10.7)

## 2018-04-16 PROCEDURE — 85610 PROTHROMBIN TIME: CPT | Performed by: ORTHOPAEDIC SURGERY

## 2018-04-16 PROCEDURE — 86900 BLOOD TYPING SEROLOGIC ABO: CPT | Performed by: ORTHOPAEDIC SURGERY

## 2018-04-16 PROCEDURE — 71046 X-RAY EXAM CHEST 2 VIEWS: CPT

## 2018-04-16 PROCEDURE — 81003 URINALYSIS AUTO W/O SCOPE: CPT | Performed by: ORTHOPAEDIC SURGERY

## 2018-04-16 PROCEDURE — 36415 COLL VENOUS BLD VENIPUNCTURE: CPT

## 2018-04-16 PROCEDURE — 85025 COMPLETE CBC W/AUTO DIFF WBC: CPT | Performed by: ORTHOPAEDIC SURGERY

## 2018-04-16 PROCEDURE — 85730 THROMBOPLASTIN TIME PARTIAL: CPT | Performed by: ORTHOPAEDIC SURGERY

## 2018-04-16 PROCEDURE — 80053 COMPREHEN METABOLIC PANEL: CPT | Performed by: ORTHOPAEDIC SURGERY

## 2018-04-16 PROCEDURE — 86901 BLOOD TYPING SEROLOGIC RH(D): CPT | Performed by: ORTHOPAEDIC SURGERY

## 2018-04-16 PROCEDURE — 86850 RBC ANTIBODY SCREEN: CPT | Performed by: ORTHOPAEDIC SURGERY

## 2018-04-16 PROCEDURE — 73502 X-RAY EXAM HIP UNI 2-3 VIEWS: CPT

## 2018-04-16 NOTE — DISCHARGE INSTRUCTIONS
Take the following medications the morning of surgery with a small sip of water:    METOPROLOL    General Instructions:  • Do not eat solid food after midnight the night before surgery.  • You may drink clear liquids day of surgery but must stop at least one hour before your hospital arrival time @ 1000 AM STOP DRINKING!!  • It is beneficial for you to have a clear drink that contains carbohydrates the day of surgery.  We suggest a 12 to 20 ounce bottle of Gatorade or Powerade for non-diabetic patients or a 12 to 20 ounce bottle of G2 or Powerade Zero for diabetic patients.    Clear liquids are liquids you can see through.  Nothing red in color.     Plain water                               Sports drinks  Sodas                                   Gelatin (Jell-O)  Fruit juices without pulp such as white grape juice and apple juice  Popsicles that contain no fruit or yogurt  Tea or coffee (no cream or milk added)  Gatorade / Powerade  G2 / Powerade Zero    • Patients who avoid smoking, chewing tobacco and alcohol for 4 weeks prior to surgery have a reduced risk of post-operative complications.  Quit smoking as many days before surgery as you can.  • Do not smoke, use chewing tobacco or drink alcohol the day of surgery.   • Bring any papers given to you in the doctor’s office.  • Wear clean comfortable clothes and socks.  • Do not wear contact lenses or make-up.  Bring a case for your glasses.   • Bring crutches or walker if applicable.  • Remove all piercings.  Leave jewelry and any other valuables at home.  • Hair extensions with metal clips must be removed prior to surgery.  • The Pre-Admission Testing nurse will instruct you to bring medications if unable to obtain an accurate list in Pre-Admission Testing.        You were given a blood bank ID arm band remember to bring it with you the day of surgery.    Preventing a Surgical Site Infection:  • For 2 to 3 days before surgery, avoid shaving with a razor because the  razor can irritate skin and make it easier to develop an infection.  • The night prior to surgery sleep in a clean bed with clean clothing.  Do not allow pets to sleep with you.  • Shower on the morning of surgery using a fresh bar of anti-bacterial soap (such as Dial) and clean washcloth.  Dry with a clean towel and dress in clean clothing.  • Ask your surgeon if you will be receiving antibiotics prior to surgery.  • Make sure you, your family, and all healthcare providers clean their hands with soap and water or an alcohol based hand  before caring for you or your wound.    Day of surgery: 04- ARRIVE @ 1100 AM REPORT TO THE MAIN OR   Upon arrival, a Pre-op nurse and Anesthesiologist will review your health history, obtain vital signs, and answer questions you may have.  The only belongings needed at this time will be your home medications.  If you are staying overnight your family can leave the rest of your belongings in the car and bring them to your room later.  A Pre-op nurse will start an IV and you may receive medication in preparation for surgery, including something to help you relax.  Your family will be able to see you in the Pre-op area.  While you are in surgery your family should notify the waiting room  if they leave the waiting room area and provide a contact phone number.    Please be aware that surgery does come with discomfort.  We want to make every effort to control your discomfort so please discuss any uncontrolled symptoms with your nurse.   Your doctor will most likely have prescribed pain medications.      If you are going home after surgery you will receive individualized written care instructions before being discharged.  A responsible adult must drive you to and from the hospital on the day of your surgery and stay with you for 24 hours.    If you are staying overnight following surgery, you will be transported to your hospital room following the recovery  period.  [facility] has all private rooms.    If you have any questions please call Pre-Admission Testing at [phone].  Deductibles and co-payments are collected on the day of service. Please be prepared to pay the required co-pay, deductible or deposit on the day of service as defined by your plan.    2% CHLORAHEXIDINE GLUCONATE* CLOTH  Preparing or “prepping” skin before surgery can reduce the risk of infection at the surgical site. To make the process easier, [facility] has chosen disposable cloths moistened with a rinse-free, 2% Chlorhexidine Gluconate (CHG) antiseptic solution. The steps below outline the prepping process and should be carefully followed.        Use the prep cloth on the area that is circled in the diagram             Directions Night before Surgery 04- pm prior to surgery  1) Shower using a fresh bar of anti-bacterial soap (such as Dial) and clean washcloth.  Use a clean towel to completely dry your skin.  2) Do not use any lotions, oils or creams on your skin.  3) Open the package and remove 1 cloth, wipe your skin for 30 seconds in a circular motion.  Allow to dry for 3 minutes.  4) Repeat #3 with second cloth.  5) Do not touch your eyes, ears, or mouth with the prep cloth.  6) Allow the wet prep solution to air dry.  7) Discard the prep cloth and wash your hands with soap and water.   8) Dress in clean bed clothes and sleep on fresh clean bed sheets.   9) You may experience some temporary itching after the prep.    Directions Day of Surgery 04- AM PRIOR TO SURGERY   1) Repeat steps 1,2,3,4,5,6,7, and 9.   2) Dress in clean clothes before coming to the hospital.    BACTROBAN NASAL OINTMENT  There are many germs normally in your nose. Bactroban is an ointment that will help reduce these germs. Please follow these instructions for Bactroban use:      ____The day before surgery in the morning  [id number]_______    ____The day before surgery in  the evening              [id number]______    ____The day of surgery in the morning    [id number]_______    **Squirt ½ package of Bactroban Ointment onto a cotton applicator and apply to inside of 1st nostril.  Squirt the remaining Bactroban and apply to the inside of the other nostril.

## 2018-04-30 ENCOUNTER — ANESTHESIA EVENT (OUTPATIENT)
Dept: PERIOP | Facility: HOSPITAL | Age: 73
End: 2018-04-30

## 2018-04-30 ENCOUNTER — APPOINTMENT (OUTPATIENT)
Dept: GENERAL RADIOLOGY | Facility: HOSPITAL | Age: 73
End: 2018-04-30

## 2018-04-30 ENCOUNTER — HOSPITAL ENCOUNTER (INPATIENT)
Facility: HOSPITAL | Age: 73
LOS: 1 days | Discharge: HOME HEALTH | End: 2018-05-01
Attending: ORTHOPAEDIC SURGERY | Admitting: ORTHOPAEDIC SURGERY

## 2018-04-30 ENCOUNTER — ANESTHESIA (OUTPATIENT)
Dept: PERIOP | Facility: HOSPITAL | Age: 73
End: 2018-04-30

## 2018-04-30 DIAGNOSIS — M16.10 PRIMARY OSTEOARTHRITIS OF HIP, UNSPECIFIED LATERALITY: Primary | ICD-10-CM

## 2018-04-30 DIAGNOSIS — R26.89 DECREASED MOBILITY: ICD-10-CM

## 2018-04-30 DIAGNOSIS — M25.559 PAINFUL HIP: ICD-10-CM

## 2018-04-30 PROCEDURE — 25010000002 ONDANSETRON PER 1 MG: Performed by: NURSE ANESTHETIST, CERTIFIED REGISTERED

## 2018-04-30 PROCEDURE — 63710000001 DULOXETINE 60 MG CAPSULE DELAYED-RELEASE PARTICLES: Performed by: ORTHOPAEDIC SURGERY

## 2018-04-30 PROCEDURE — 25010000002 FENTANYL CITRATE (PF) 100 MCG/2ML SOLUTION: Performed by: NURSE ANESTHETIST, CERTIFIED REGISTERED

## 2018-04-30 PROCEDURE — 0SRS0JZ REPLACEMENT OF LEFT HIP JOINT, FEMORAL SURFACE WITH SYNTHETIC SUBSTITUTE, OPEN APPROACH: ICD-10-PCS | Performed by: ORTHOPAEDIC SURGERY

## 2018-04-30 PROCEDURE — 87070 CULTURE OTHR SPECIMN AEROBIC: CPT | Performed by: ORTHOPAEDIC SURGERY

## 2018-04-30 PROCEDURE — 25010000003 CEFAZOLIN IN DEXTROSE 2-4 GM/100ML-% SOLUTION: Performed by: ORTHOPAEDIC SURGERY

## 2018-04-30 PROCEDURE — A9270 NON-COVERED ITEM OR SERVICE: HCPCS | Performed by: ORTHOPAEDIC SURGERY

## 2018-04-30 PROCEDURE — G0378 HOSPITAL OBSERVATION PER HR: HCPCS

## 2018-04-30 PROCEDURE — 25010000002 HYDROMORPHONE PER 4 MG: Performed by: NURSE ANESTHETIST, CERTIFIED REGISTERED

## 2018-04-30 PROCEDURE — 25010000002 KETOROLAC TROMETHAMINE PER 15 MG: Performed by: ORTHOPAEDIC SURGERY

## 2018-04-30 PROCEDURE — 63710000001 OXYCODONE-ACETAMINOPHEN 5-325 MG TABLET: Performed by: ORTHOPAEDIC SURGERY

## 2018-04-30 PROCEDURE — 25010000002 MIDAZOLAM PER 1 MG: Performed by: ANESTHESIOLOGY

## 2018-04-30 PROCEDURE — 25010000002 PROPOFOL 10 MG/ML EMULSION: Performed by: NURSE ANESTHETIST, CERTIFIED REGISTERED

## 2018-04-30 PROCEDURE — 88305 TISSUE EXAM BY PATHOLOGIST: CPT | Performed by: ORTHOPAEDIC SURGERY

## 2018-04-30 PROCEDURE — 63710000001 ASPIRIN EC 325 MG TABLET DELAYED-RELEASE: Performed by: ORTHOPAEDIC SURGERY

## 2018-04-30 PROCEDURE — C1776 JOINT DEVICE (IMPLANTABLE): HCPCS | Performed by: ORTHOPAEDIC SURGERY

## 2018-04-30 PROCEDURE — 25010000002 VANCOMYCIN 10 G RECONSTITUTED SOLUTION: Performed by: ORTHOPAEDIC SURGERY

## 2018-04-30 PROCEDURE — 25010000002 DEXAMETHASONE PER 1 MG: Performed by: NURSE ANESTHETIST, CERTIFIED REGISTERED

## 2018-04-30 PROCEDURE — 73501 X-RAY EXAM HIP UNI 1 VIEW: CPT

## 2018-04-30 PROCEDURE — 0SPS0JZ REMOVAL OF SYNTHETIC SUBSTITUTE FROM LEFT HIP JOINT, FEMORAL SURFACE, OPEN APPROACH: ICD-10-PCS | Performed by: ORTHOPAEDIC SURGERY

## 2018-04-30 PROCEDURE — 88331 PATH CONSLTJ SURG 1 BLK 1SPC: CPT | Performed by: ORTHOPAEDIC SURGERY

## 2018-04-30 PROCEDURE — 87205 SMEAR GRAM STAIN: CPT | Performed by: ORTHOPAEDIC SURGERY

## 2018-04-30 PROCEDURE — 87075 CULTR BACTERIA EXCEPT BLOOD: CPT | Performed by: ORTHOPAEDIC SURGERY

## 2018-04-30 PROCEDURE — 25010000002 PHENYLEPHRINE PER 1 ML: Performed by: NURSE ANESTHETIST, CERTIFIED REGISTERED

## 2018-04-30 PROCEDURE — 63710000001 METOPROLOL TARTRATE 50 MG TABLET: Performed by: ORTHOPAEDIC SURGERY

## 2018-04-30 DEVICE — IMPLANTABLE DEVICE
Type: IMPLANTABLE DEVICE | Site: FEMUR | Status: FUNCTIONAL
Brand: REACH REVISION HIP SYSTEM

## 2018-04-30 DEVICE — IMPLANTABLE DEVICE
Type: IMPLANTABLE DEVICE | Site: FEMUR | Status: FUNCTIONAL
Brand: BIOMET HIP SYSTEM

## 2018-04-30 RX ORDER — FLUMAZENIL 0.1 MG/ML
0.2 INJECTION INTRAVENOUS AS NEEDED
Status: DISCONTINUED | OUTPATIENT
Start: 2018-04-30 | End: 2018-04-30 | Stop reason: HOSPADM

## 2018-04-30 RX ORDER — ACETAMINOPHEN 650 MG/1
650 SUPPOSITORY RECTAL EVERY 4 HOURS PRN
Status: DISCONTINUED | OUTPATIENT
Start: 2018-04-30 | End: 2018-05-01 | Stop reason: HOSPADM

## 2018-04-30 RX ORDER — SODIUM CHLORIDE, SODIUM LACTATE, POTASSIUM CHLORIDE, CALCIUM CHLORIDE 600; 310; 30; 20 MG/100ML; MG/100ML; MG/100ML; MG/100ML
100 INJECTION, SOLUTION INTRAVENOUS CONTINUOUS
Status: DISCONTINUED | OUTPATIENT
Start: 2018-04-30 | End: 2018-05-01 | Stop reason: HOSPADM

## 2018-04-30 RX ORDER — FERROUS SULFATE 325(65) MG
325 TABLET ORAL
Status: DISCONTINUED | OUTPATIENT
Start: 2018-05-01 | End: 2018-05-01 | Stop reason: HOSPADM

## 2018-04-30 RX ORDER — METOPROLOL TARTRATE 50 MG/1
50 TABLET, FILM COATED ORAL EVERY 12 HOURS SCHEDULED
Status: DISCONTINUED | OUTPATIENT
Start: 2018-04-30 | End: 2018-05-01 | Stop reason: HOSPADM

## 2018-04-30 RX ORDER — DULOXETIN HYDROCHLORIDE 60 MG/1
60 CAPSULE, DELAYED RELEASE ORAL DAILY
Status: DISCONTINUED | OUTPATIENT
Start: 2018-04-30 | End: 2018-05-01 | Stop reason: HOSPADM

## 2018-04-30 RX ORDER — HYDROCODONE BITARTRATE AND ACETAMINOPHEN 7.5; 325 MG/1; MG/1
1 TABLET ORAL ONCE AS NEEDED
Status: DISCONTINUED | OUTPATIENT
Start: 2018-04-30 | End: 2018-04-30 | Stop reason: HOSPADM

## 2018-04-30 RX ORDER — HYDROMORPHONE HYDROCHLORIDE 1 MG/ML
0.5 INJECTION, SOLUTION INTRAMUSCULAR; INTRAVENOUS; SUBCUTANEOUS EVERY 4 HOURS PRN
Status: DISCONTINUED | OUTPATIENT
Start: 2018-04-30 | End: 2018-05-01 | Stop reason: HOSPADM

## 2018-04-30 RX ORDER — ONDANSETRON 4 MG/1
4 TABLET, ORALLY DISINTEGRATING ORAL EVERY 6 HOURS PRN
Status: DISCONTINUED | OUTPATIENT
Start: 2018-04-30 | End: 2018-05-01 | Stop reason: HOSPADM

## 2018-04-30 RX ORDER — HYDRALAZINE HYDROCHLORIDE 20 MG/ML
5 INJECTION INTRAMUSCULAR; INTRAVENOUS
Status: DISCONTINUED | OUTPATIENT
Start: 2018-04-30 | End: 2018-04-30 | Stop reason: HOSPADM

## 2018-04-30 RX ORDER — MIDAZOLAM HYDROCHLORIDE 1 MG/ML
2 INJECTION INTRAMUSCULAR; INTRAVENOUS
Status: DISCONTINUED | OUTPATIENT
Start: 2018-04-30 | End: 2018-04-30 | Stop reason: HOSPADM

## 2018-04-30 RX ORDER — NALOXONE HCL 0.4 MG/ML
0.1 VIAL (ML) INJECTION
Status: DISCONTINUED | OUTPATIENT
Start: 2018-04-30 | End: 2018-05-01 | Stop reason: HOSPADM

## 2018-04-30 RX ORDER — LIDOCAINE HYDROCHLORIDE 20 MG/ML
INJECTION, SOLUTION INFILTRATION; PERINEURAL AS NEEDED
Status: DISCONTINUED | OUTPATIENT
Start: 2018-04-30 | End: 2018-04-30 | Stop reason: SURG

## 2018-04-30 RX ORDER — BISACODYL 5 MG/1
10 TABLET, DELAYED RELEASE ORAL DAILY PRN
Status: DISCONTINUED | OUTPATIENT
Start: 2018-04-30 | End: 2018-05-01 | Stop reason: HOSPADM

## 2018-04-30 RX ORDER — MIDAZOLAM HYDROCHLORIDE 1 MG/ML
1 INJECTION INTRAMUSCULAR; INTRAVENOUS
Status: DISCONTINUED | OUTPATIENT
Start: 2018-04-30 | End: 2018-04-30 | Stop reason: HOSPADM

## 2018-04-30 RX ORDER — HYDROMORPHONE HCL 110MG/55ML
0.5 PATIENT CONTROLLED ANALGESIA SYRINGE INTRAVENOUS
Status: DISCONTINUED | OUTPATIENT
Start: 2018-04-30 | End: 2018-04-30 | Stop reason: HOSPADM

## 2018-04-30 RX ORDER — EPHEDRINE SULFATE 50 MG/ML
5 INJECTION, SOLUTION INTRAVENOUS ONCE AS NEEDED
Status: DISCONTINUED | OUTPATIENT
Start: 2018-04-30 | End: 2018-04-30 | Stop reason: HOSPADM

## 2018-04-30 RX ORDER — PROMETHAZINE HYDROCHLORIDE 25 MG/ML
12.5 INJECTION, SOLUTION INTRAMUSCULAR; INTRAVENOUS ONCE AS NEEDED
Status: DISCONTINUED | OUTPATIENT
Start: 2018-04-30 | End: 2018-04-30 | Stop reason: HOSPADM

## 2018-04-30 RX ORDER — ROCURONIUM BROMIDE 10 MG/ML
INJECTION, SOLUTION INTRAVENOUS AS NEEDED
Status: DISCONTINUED | OUTPATIENT
Start: 2018-04-30 | End: 2018-04-30 | Stop reason: SURG

## 2018-04-30 RX ORDER — CEFAZOLIN SODIUM 2 G/100ML
2 INJECTION, SOLUTION INTRAVENOUS EVERY 8 HOURS
Status: COMPLETED | OUTPATIENT
Start: 2018-04-30 | End: 2018-05-01

## 2018-04-30 RX ORDER — PROMETHAZINE HYDROCHLORIDE 25 MG/1
25 SUPPOSITORY RECTAL ONCE AS NEEDED
Status: DISCONTINUED | OUTPATIENT
Start: 2018-04-30 | End: 2018-04-30 | Stop reason: HOSPADM

## 2018-04-30 RX ORDER — ONDANSETRON 2 MG/ML
4 INJECTION INTRAMUSCULAR; INTRAVENOUS ONCE AS NEEDED
Status: DISCONTINUED | OUTPATIENT
Start: 2018-04-30 | End: 2018-04-30 | Stop reason: HOSPADM

## 2018-04-30 RX ORDER — ACETAMINOPHEN 325 MG/1
325 TABLET ORAL EVERY 4 HOURS PRN
Status: DISCONTINUED | OUTPATIENT
Start: 2018-04-30 | End: 2018-05-01 | Stop reason: HOSPADM

## 2018-04-30 RX ORDER — ONDANSETRON 2 MG/ML
4 INJECTION INTRAMUSCULAR; INTRAVENOUS EVERY 6 HOURS PRN
Status: DISCONTINUED | OUTPATIENT
Start: 2018-04-30 | End: 2018-05-01 | Stop reason: HOSPADM

## 2018-04-30 RX ORDER — ACETAMINOPHEN 160 MG/5ML
650 SOLUTION ORAL EVERY 4 HOURS PRN
Status: DISCONTINUED | OUTPATIENT
Start: 2018-04-30 | End: 2018-05-01 | Stop reason: HOSPADM

## 2018-04-30 RX ORDER — NALOXONE HCL 0.4 MG/ML
0.2 VIAL (ML) INJECTION AS NEEDED
Status: DISCONTINUED | OUTPATIENT
Start: 2018-04-30 | End: 2018-04-30 | Stop reason: HOSPADM

## 2018-04-30 RX ORDER — BISACODYL 10 MG
10 SUPPOSITORY, RECTAL RECTAL DAILY PRN
Status: DISCONTINUED | OUTPATIENT
Start: 2018-04-30 | End: 2018-05-01 | Stop reason: HOSPADM

## 2018-04-30 RX ORDER — OXYCODONE HYDROCHLORIDE AND ACETAMINOPHEN 5; 325 MG/1; MG/1
2 TABLET ORAL EVERY 4 HOURS PRN
Status: DISCONTINUED | OUTPATIENT
Start: 2018-04-30 | End: 2018-05-01 | Stop reason: HOSPADM

## 2018-04-30 RX ORDER — DIPHENHYDRAMINE HYDROCHLORIDE 50 MG/ML
12.5 INJECTION INTRAMUSCULAR; INTRAVENOUS
Status: DISCONTINUED | OUTPATIENT
Start: 2018-04-30 | End: 2018-04-30 | Stop reason: HOSPADM

## 2018-04-30 RX ORDER — ONDANSETRON 4 MG/1
4 TABLET, FILM COATED ORAL EVERY 6 HOURS PRN
Status: DISCONTINUED | OUTPATIENT
Start: 2018-04-30 | End: 2018-05-01 | Stop reason: HOSPADM

## 2018-04-30 RX ORDER — MAGNESIUM HYDROXIDE 1200 MG/15ML
LIQUID ORAL AS NEEDED
Status: DISCONTINUED | OUTPATIENT
Start: 2018-04-30 | End: 2018-04-30 | Stop reason: HOSPADM

## 2018-04-30 RX ORDER — PROMETHAZINE HYDROCHLORIDE 25 MG/1
12.5 TABLET ORAL ONCE AS NEEDED
Status: DISCONTINUED | OUTPATIENT
Start: 2018-04-30 | End: 2018-04-30 | Stop reason: HOSPADM

## 2018-04-30 RX ORDER — SODIUM CHLORIDE, SODIUM LACTATE, POTASSIUM CHLORIDE, CALCIUM CHLORIDE 600; 310; 30; 20 MG/100ML; MG/100ML; MG/100ML; MG/100ML
9 INJECTION, SOLUTION INTRAVENOUS CONTINUOUS
Status: DISCONTINUED | OUTPATIENT
Start: 2018-04-30 | End: 2018-05-01 | Stop reason: HOSPADM

## 2018-04-30 RX ORDER — ACETAMINOPHEN 500 MG
1000 TABLET ORAL ONCE
Status: COMPLETED | OUTPATIENT
Start: 2018-04-30 | End: 2018-04-30

## 2018-04-30 RX ORDER — TRANEXAMIC ACID 100 MG/ML
INJECTION, SOLUTION INTRAVENOUS AS NEEDED
Status: DISCONTINUED | OUTPATIENT
Start: 2018-04-30 | End: 2018-04-30 | Stop reason: SURG

## 2018-04-30 RX ORDER — ASPIRIN 325 MG
325 TABLET, DELAYED RELEASE (ENTERIC COATED) ORAL DAILY
Status: DISCONTINUED | OUTPATIENT
Start: 2018-04-30 | End: 2018-05-01 | Stop reason: HOSPADM

## 2018-04-30 RX ORDER — ACETAMINOPHEN 325 MG/1
650 TABLET ORAL EVERY 4 HOURS PRN
Status: DISCONTINUED | OUTPATIENT
Start: 2018-04-30 | End: 2018-05-01 | Stop reason: HOSPADM

## 2018-04-30 RX ORDER — ONDANSETRON 2 MG/ML
INJECTION INTRAMUSCULAR; INTRAVENOUS AS NEEDED
Status: DISCONTINUED | OUTPATIENT
Start: 2018-04-30 | End: 2018-04-30 | Stop reason: SURG

## 2018-04-30 RX ORDER — PROPOFOL 10 MG/ML
VIAL (ML) INTRAVENOUS AS NEEDED
Status: DISCONTINUED | OUTPATIENT
Start: 2018-04-30 | End: 2018-04-30 | Stop reason: SURG

## 2018-04-30 RX ORDER — OXYCODONE AND ACETAMINOPHEN 7.5; 325 MG/1; MG/1
1 TABLET ORAL ONCE AS NEEDED
Status: DISCONTINUED | OUTPATIENT
Start: 2018-04-30 | End: 2018-04-30 | Stop reason: HOSPADM

## 2018-04-30 RX ORDER — KETOROLAC TROMETHAMINE 15 MG/ML
15 INJECTION, SOLUTION INTRAMUSCULAR; INTRAVENOUS EVERY 6 HOURS
Status: DISCONTINUED | OUTPATIENT
Start: 2018-04-30 | End: 2018-05-01 | Stop reason: HOSPADM

## 2018-04-30 RX ORDER — DEXAMETHASONE SODIUM PHOSPHATE 10 MG/ML
INJECTION INTRAMUSCULAR; INTRAVENOUS AS NEEDED
Status: DISCONTINUED | OUTPATIENT
Start: 2018-04-30 | End: 2018-04-30 | Stop reason: SURG

## 2018-04-30 RX ORDER — EPHEDRINE SULFATE 50 MG/ML
INJECTION, SOLUTION INTRAVENOUS AS NEEDED
Status: DISCONTINUED | OUTPATIENT
Start: 2018-04-30 | End: 2018-04-30 | Stop reason: SURG

## 2018-04-30 RX ORDER — MEPERIDINE HYDROCHLORIDE 25 MG/ML
12.5 INJECTION INTRAMUSCULAR; INTRAVENOUS; SUBCUTANEOUS
Status: DISCONTINUED | OUTPATIENT
Start: 2018-04-30 | End: 2018-04-30 | Stop reason: HOSPADM

## 2018-04-30 RX ORDER — LABETALOL HYDROCHLORIDE 5 MG/ML
5 INJECTION, SOLUTION INTRAVENOUS
Status: DISCONTINUED | OUTPATIENT
Start: 2018-04-30 | End: 2018-04-30 | Stop reason: HOSPADM

## 2018-04-30 RX ORDER — FAMOTIDINE 10 MG/ML
20 INJECTION, SOLUTION INTRAVENOUS ONCE
Status: COMPLETED | OUTPATIENT
Start: 2018-04-30 | End: 2018-04-30

## 2018-04-30 RX ORDER — OXYCODONE HYDROCHLORIDE AND ACETAMINOPHEN 5; 325 MG/1; MG/1
1 TABLET ORAL EVERY 4 HOURS PRN
Status: DISCONTINUED | OUTPATIENT
Start: 2018-04-30 | End: 2018-05-01 | Stop reason: HOSPADM

## 2018-04-30 RX ORDER — FENTANYL CITRATE 50 UG/ML
50 INJECTION, SOLUTION INTRAMUSCULAR; INTRAVENOUS
Status: DISCONTINUED | OUTPATIENT
Start: 2018-04-30 | End: 2018-04-30 | Stop reason: HOSPADM

## 2018-04-30 RX ORDER — SODIUM CHLORIDE 0.9 % (FLUSH) 0.9 %
1-10 SYRINGE (ML) INJECTION AS NEEDED
Status: DISCONTINUED | OUTPATIENT
Start: 2018-04-30 | End: 2018-04-30 | Stop reason: HOSPADM

## 2018-04-30 RX ORDER — FENTANYL CITRATE 50 UG/ML
INJECTION, SOLUTION INTRAMUSCULAR; INTRAVENOUS AS NEEDED
Status: DISCONTINUED | OUTPATIENT
Start: 2018-04-30 | End: 2018-04-30 | Stop reason: SURG

## 2018-04-30 RX ORDER — PROMETHAZINE HYDROCHLORIDE 25 MG/1
25 TABLET ORAL ONCE AS NEEDED
Status: DISCONTINUED | OUTPATIENT
Start: 2018-04-30 | End: 2018-04-30 | Stop reason: HOSPADM

## 2018-04-30 RX ADMIN — VANCOMYCIN HYDROCHLORIDE 1500 MG: 10 INJECTION, POWDER, LYOPHILIZED, FOR SOLUTION INTRAVENOUS at 11:57

## 2018-04-30 RX ADMIN — FENTANYL CITRATE 100 MCG: 50 INJECTION INTRAMUSCULAR; INTRAVENOUS at 13:33

## 2018-04-30 RX ADMIN — PHENYLEPHRINE HYDROCHLORIDE 100 MCG: 10 INJECTION INTRAVENOUS at 15:05

## 2018-04-30 RX ADMIN — ROCURONIUM BROMIDE 50 MG: 10 INJECTION INTRAVENOUS at 13:33

## 2018-04-30 RX ADMIN — ONDANSETRON 4 MG: 2 INJECTION INTRAMUSCULAR; INTRAVENOUS at 15:18

## 2018-04-30 RX ADMIN — DEXAMETHASONE SODIUM PHOSPHATE 6 MG: 10 INJECTION INTRAMUSCULAR; INTRAVENOUS at 14:18

## 2018-04-30 RX ADMIN — FENTANYL CITRATE 50 MCG: 50 INJECTION INTRAMUSCULAR; INTRAVENOUS at 16:24

## 2018-04-30 RX ADMIN — SODIUM CHLORIDE, POTASSIUM CHLORIDE, SODIUM LACTATE AND CALCIUM CHLORIDE 9 ML/HR: 600; 310; 30; 20 INJECTION, SOLUTION INTRAVENOUS at 12:09

## 2018-04-30 RX ADMIN — FAMOTIDINE 20 MG: 10 INJECTION INTRAVENOUS at 12:09

## 2018-04-30 RX ADMIN — SODIUM CHLORIDE, POTASSIUM CHLORIDE, SODIUM LACTATE AND CALCIUM CHLORIDE 100 ML/HR: 600; 310; 30; 20 INJECTION, SOLUTION INTRAVENOUS at 17:47

## 2018-04-30 RX ADMIN — FENTANYL CITRATE 50 MCG: 50 INJECTION INTRAMUSCULAR; INTRAVENOUS at 16:16

## 2018-04-30 RX ADMIN — CEFAZOLIN SODIUM 2 G: 2 INJECTION, SOLUTION INTRAVENOUS at 20:01

## 2018-04-30 RX ADMIN — PHENYLEPHRINE HYDROCHLORIDE 100 MCG: 10 INJECTION INTRAVENOUS at 13:37

## 2018-04-30 RX ADMIN — MIDAZOLAM HYDROCHLORIDE 1 MG: 2 INJECTION, SOLUTION INTRAMUSCULAR; INTRAVENOUS at 12:53

## 2018-04-30 RX ADMIN — MIDAZOLAM HYDROCHLORIDE 1 MG: 2 INJECTION, SOLUTION INTRAMUSCULAR; INTRAVENOUS at 12:09

## 2018-04-30 RX ADMIN — SUGAMMADEX 200 MG: 100 INJECTION, SOLUTION INTRAVENOUS at 15:25

## 2018-04-30 RX ADMIN — EPHEDRINE SULFATE 10 MG: 50 INJECTION INTRAMUSCULAR; INTRAVENOUS; SUBCUTANEOUS at 14:10

## 2018-04-30 RX ADMIN — PHENYLEPHRINE HYDROCHLORIDE 100 MCG: 10 INJECTION INTRAVENOUS at 15:10

## 2018-04-30 RX ADMIN — OXYCODONE HYDROCHLORIDE AND ACETAMINOPHEN 2 TABLET: 5; 325 TABLET ORAL at 17:46

## 2018-04-30 RX ADMIN — KETOROLAC TROMETHAMINE 15 MG: 15 INJECTION, SOLUTION INTRAMUSCULAR; INTRAVENOUS at 20:02

## 2018-04-30 RX ADMIN — EPHEDRINE SULFATE 10 MG: 50 INJECTION INTRAMUSCULAR; INTRAVENOUS; SUBCUTANEOUS at 14:01

## 2018-04-30 RX ADMIN — EPHEDRINE SULFATE 15 MG: 50 INJECTION INTRAMUSCULAR; INTRAVENOUS; SUBCUTANEOUS at 14:05

## 2018-04-30 RX ADMIN — PROPOFOL 150 MG: 10 INJECTION, EMULSION INTRAVENOUS at 13:33

## 2018-04-30 RX ADMIN — ACETAMINOPHEN 1000 MG: 500 TABLET, FILM COATED ORAL at 11:52

## 2018-04-30 RX ADMIN — ASPIRIN 325 MG: 325 TABLET, DELAYED RELEASE ORAL at 20:04

## 2018-04-30 RX ADMIN — EPHEDRINE SULFATE 10 MG: 50 INJECTION INTRAMUSCULAR; INTRAVENOUS; SUBCUTANEOUS at 13:57

## 2018-04-30 RX ADMIN — HYDROMORPHONE HYDROCHLORIDE 0.5 MG: 2 INJECTION INTRAMUSCULAR; INTRAVENOUS; SUBCUTANEOUS at 16:31

## 2018-04-30 RX ADMIN — SODIUM CHLORIDE, POTASSIUM CHLORIDE, SODIUM LACTATE AND CALCIUM CHLORIDE: 600; 310; 30; 20 INJECTION, SOLUTION INTRAVENOUS at 14:15

## 2018-04-30 RX ADMIN — DULOXETINE HYDROCHLORIDE 60 MG: 60 CAPSULE, DELAYED RELEASE ORAL at 20:02

## 2018-04-30 RX ADMIN — LIDOCAINE HYDROCHLORIDE 80 MG: 20 INJECTION, SOLUTION INFILTRATION; PERINEURAL at 13:33

## 2018-04-30 RX ADMIN — METOPROLOL TARTRATE 50 MG: 50 TABLET, FILM COATED ORAL at 22:18

## 2018-04-30 RX ADMIN — OXYCODONE HYDROCHLORIDE AND ACETAMINOPHEN 2 TABLET: 5; 325 TABLET ORAL at 22:18

## 2018-04-30 RX ADMIN — TRANEXAMIC ACID 1000 MG: 100 INJECTION, SOLUTION INTRAVENOUS at 13:59

## 2018-04-30 RX ADMIN — EPHEDRINE SULFATE 5 MG: 50 INJECTION INTRAMUSCULAR; INTRAVENOUS; SUBCUTANEOUS at 13:54

## 2018-04-30 RX ADMIN — PHENYLEPHRINE HYDROCHLORIDE 100 MCG: 10 INJECTION INTRAVENOUS at 13:46

## 2018-04-30 NOTE — ANESTHESIA PROCEDURE NOTES
Airway  Urgency: elective    Date/Time: 4/30/2018 1:36 PM  Airway not difficult    General Information and Staff    Patient location during procedure: OR  Anesthesiologist: BLAZE PRIDE  CRNA: EVY STEIN    Indications and Patient Condition  Indications for airway management: airway protection    Preoxygenated: yes  Mask difficulty assessment: 1 - vent by mask    Final Airway Details  Final airway type: endotracheal airway      Successful airway: ETT  Cuffed: yes   Successful intubation technique: direct laryngoscopy  Endotracheal tube insertion site: oral  Blade: Rei  Blade size: #4  ETT size: 7.5 mm  Cormack-Lehane Classification: grade I - full view of glottis  Placement verified by: chest auscultation and capnometry   Measured from: lips  ETT to lips (cm): 21  Number of attempts at approach: 1    Additional Comments  Smooth IV induction. Trachea intubated. Cuff up. Dentition intact without injury.

## 2018-04-30 NOTE — ANESTHESIA POSTPROCEDURE EVALUATION
Patient: José Moody    Procedure Summary     Date:  04/30/18 Room / Location:  [facility] OR  / [facility] MAIN OR    Anesthesia Start:  1327 Anesthesia Stop:  1535    Procedure:  LT FEMORAL REVISION (Left Hip) Diagnosis:      Surgeon:  José Machado MD Provider:  Meghan Encinas MD    Anesthesia Type:  general ASA Status:  3          Anesthesia Type: general  Last vitals  BP   112/61 (04/30/18 1635)   Temp   36.4 °C (97.5 °F) (04/30/18 1532)   Pulse   75 (04/30/18 1635)   Resp   16 (04/30/18 1635)     SpO2   98 % (04/30/18 1635)     Post Anesthesia Care and Evaluation    Patient location during evaluation: bedside  Patient participation: complete - patient participated  Level of consciousness: awake  Pain management: adequate  Airway patency: patent  Anesthetic complications: No anesthetic complications    Cardiovascular status: acceptable  Respiratory status: acceptable  Hydration status: acceptable    Comments: /61 (BP Location: Left arm, Patient Position: Lying)   Pulse 74   Temp 36.4 °C (97.6 °F) (Oral)   Resp 16   SpO2 96%

## 2018-04-30 NOTE — ANESTHESIA PREPROCEDURE EVALUATION
Anesthesia Evaluation     history of anesthetic complications:               Airway   Mallampati: II  TM distance: >3 FB  Neck ROM: full  Small opening  Dental      Comment: Lower dental inplant    Pulmonary    (+) shortness of breath,   Cardiovascular     (+) CAD, CABG, dysrhythmias Atrial Fib, CARTER, hyperlipidemia,       Neuro/Psych  GI/Hepatic/Renal/Endo    (+)   hepatitis, renal disease,     Musculoskeletal     Abdominal    Substance History      OB/GYN          Other                        Anesthesia Plan    ASA 3     general     intravenous induction   Anesthetic plan and risks discussed with patient.

## 2018-04-30 NOTE — ANESTHESIA PREPROCEDURE EVALUATION
Anesthesia Evaluation     history of anesthetic complications: PONV               Airway   Mallampati: II  TM distance: >3 FB  Neck ROM: full  Small opening  Dental      Comment: Lower inplant    Pulmonary    (+) a smoker Former, shortness of breath,   Cardiovascular     (+) CAD, CABG > 6 Months, dysrhythmias Atrial Fib, hyperlipidemia,       Neuro/Psych  GI/Hepatic/Renal/Endo      Musculoskeletal     Abdominal    Substance History      OB/GYN          Other                      Anesthesia Plan    ASA 3     general     intravenous induction   Anesthetic plan and risks discussed with patient.

## 2018-05-01 VITALS
TEMPERATURE: 96.9 F | OXYGEN SATURATION: 97 % | SYSTOLIC BLOOD PRESSURE: 104 MMHG | WEIGHT: 187 LBS | RESPIRATION RATE: 16 BRPM | DIASTOLIC BLOOD PRESSURE: 53 MMHG | HEIGHT: 66 IN | HEART RATE: 78 BPM | BODY MASS INDEX: 30.05 KG/M2

## 2018-05-01 LAB
ANION GAP SERPL CALCULATED.3IONS-SCNC: 13 MMOL/L
BUN BLD-MCNC: 13 MG/DL (ref 8–23)
BUN/CREAT SERPL: 13.3 (ref 7–25)
CALCIUM SPEC-SCNC: 9 MG/DL (ref 8.6–10.5)
CHLORIDE SERPL-SCNC: 100 MMOL/L (ref 98–107)
CO2 SERPL-SCNC: 22 MMOL/L (ref 22–29)
CREAT BLD-MCNC: 0.98 MG/DL (ref 0.76–1.27)
CYTO UR: NORMAL
GFR SERPL CREATININE-BSD FRML MDRD: 75 ML/MIN/1.73
GLUCOSE BLD-MCNC: 154 MG/DL (ref 65–99)
HCT VFR BLD AUTO: 37.2 % (ref 40.4–52.2)
HGB BLD-MCNC: 11.8 G/DL (ref 13.7–17.6)
LAB AP CASE REPORT: NORMAL
Lab: NORMAL
Lab: NORMAL
PATH REPORT.FINAL DX SPEC: NORMAL
PATH REPORT.GROSS SPEC: NORMAL
POTASSIUM BLD-SCNC: 4.5 MMOL/L (ref 3.5–5.2)
SODIUM BLD-SCNC: 135 MMOL/L (ref 136–145)

## 2018-05-01 PROCEDURE — 97535 SELF CARE MNGMENT TRAINING: CPT | Performed by: OCCUPATIONAL THERAPIST

## 2018-05-01 PROCEDURE — 63710000001 DULOXETINE 60 MG CAPSULE DELAYED-RELEASE PARTICLES: Performed by: ORTHOPAEDIC SURGERY

## 2018-05-01 PROCEDURE — G0378 HOSPITAL OBSERVATION PER HR: HCPCS

## 2018-05-01 PROCEDURE — 25010000002 FONDAPARINUX PER 0.5 MG: Performed by: ORTHOPAEDIC SURGERY

## 2018-05-01 PROCEDURE — 85018 HEMOGLOBIN: CPT | Performed by: ORTHOPAEDIC SURGERY

## 2018-05-01 PROCEDURE — 63710000001 OXYCODONE-ACETAMINOPHEN 5-325 MG TABLET: Performed by: ORTHOPAEDIC SURGERY

## 2018-05-01 PROCEDURE — 25010000003 CEFAZOLIN IN DEXTROSE 2-4 GM/100ML-% SOLUTION: Performed by: ORTHOPAEDIC SURGERY

## 2018-05-01 PROCEDURE — 63710000001 FERROUS SULFATE 325 (65 FE) MG TABLET: Performed by: ORTHOPAEDIC SURGERY

## 2018-05-01 PROCEDURE — 97161 PT EVAL LOW COMPLEX 20 MIN: CPT

## 2018-05-01 PROCEDURE — G8979 MOBILITY GOAL STATUS: HCPCS

## 2018-05-01 PROCEDURE — 97110 THERAPEUTIC EXERCISES: CPT

## 2018-05-01 PROCEDURE — 80048 BASIC METABOLIC PNL TOTAL CA: CPT | Performed by: ORTHOPAEDIC SURGERY

## 2018-05-01 PROCEDURE — A9270 NON-COVERED ITEM OR SERVICE: HCPCS | Performed by: ORTHOPAEDIC SURGERY

## 2018-05-01 PROCEDURE — 25010000002 KETOROLAC TROMETHAMINE PER 15 MG: Performed by: ORTHOPAEDIC SURGERY

## 2018-05-01 PROCEDURE — 85014 HEMATOCRIT: CPT | Performed by: ORTHOPAEDIC SURGERY

## 2018-05-01 PROCEDURE — 63710000001 METOPROLOL TARTRATE 50 MG TABLET: Performed by: ORTHOPAEDIC SURGERY

## 2018-05-01 PROCEDURE — 97165 OT EVAL LOW COMPLEX 30 MIN: CPT | Performed by: OCCUPATIONAL THERAPIST

## 2018-05-01 PROCEDURE — 63710000001 ASPIRIN EC 325 MG TABLET DELAYED-RELEASE: Performed by: ORTHOPAEDIC SURGERY

## 2018-05-01 PROCEDURE — G8980 MOBILITY D/C STATUS: HCPCS

## 2018-05-01 PROCEDURE — G8978 MOBILITY CURRENT STATUS: HCPCS

## 2018-05-01 RX ORDER — OXYCODONE HYDROCHLORIDE AND ACETAMINOPHEN 5; 325 MG/1; MG/1
1-2 TABLET ORAL EVERY 4 HOURS PRN
Qty: 75 TABLET | Refills: 0 | Status: SHIPPED | OUTPATIENT
Start: 2018-05-01 | End: 2018-05-10

## 2018-05-01 RX ORDER — FONDAPARINUX SODIUM 2.5 MG/.5ML
2.5 INJECTION SUBCUTANEOUS ONCE
Status: COMPLETED | OUTPATIENT
Start: 2018-05-01 | End: 2018-05-01

## 2018-05-01 RX ADMIN — METOPROLOL TARTRATE 50 MG: 50 TABLET, FILM COATED ORAL at 09:17

## 2018-05-01 RX ADMIN — OXYCODONE HYDROCHLORIDE AND ACETAMINOPHEN 2 TABLET: 5; 325 TABLET ORAL at 09:17

## 2018-05-01 RX ADMIN — OXYCODONE HYDROCHLORIDE AND ACETAMINOPHEN 2 TABLET: 5; 325 TABLET ORAL at 02:19

## 2018-05-01 RX ADMIN — ASPIRIN 325 MG: 325 TABLET, DELAYED RELEASE ORAL at 09:16

## 2018-05-01 RX ADMIN — FERROUS SULFATE TAB 325 MG (65 MG ELEMENTAL FE) 325 MG: 325 (65 FE) TAB at 09:17

## 2018-05-01 RX ADMIN — DULOXETINE HYDROCHLORIDE 60 MG: 60 CAPSULE, DELAYED RELEASE ORAL at 09:17

## 2018-05-01 RX ADMIN — KETOROLAC TROMETHAMINE 15 MG: 15 INJECTION, SOLUTION INTRAMUSCULAR; INTRAVENOUS at 02:07

## 2018-05-01 RX ADMIN — FONDAPARINUX SODIUM 2.5 MG: 2.5 INJECTION, SOLUTION SUBCUTANEOUS at 09:17

## 2018-05-01 RX ADMIN — CEFAZOLIN SODIUM 2 G: 2 INJECTION, SOLUTION INTRAVENOUS at 02:07

## 2018-05-01 NOTE — PROGRESS NOTES
"/58 (BP Location: Left arm, Patient Position: Lying)   Pulse 89   Temp 98.1 °F (36.7 °C) (Oral)   Resp 16   Ht 167.6 cm (66\")   Wt 84.8 kg (187 lb)   SpO2 96%   BMI 30.18 kg/m²     Lab Results (last 24 hours)     Procedure Component Value Units Date/Time    Wound Culture - Wound, Hip, Left [[id number]]  (Normal) Collected:  04/30/18 1402    Specimen:  Wound from Hip, Left Updated:  05/01/18 0618     Wound Culture No growth at less than 24 hours     Gram Stain Result Many (4+) Red blood cells      No organisms seen    Basic Metabolic Panel [[id number]]  (Abnormal) Collected:  05/01/18 0321    Specimen:  Blood Updated:  05/01/18 0431     Glucose 154 (H) mg/dL      BUN 13 mg/dL      Creatinine 0.98 mg/dL      Sodium 135 (L) mmol/L      Potassium 4.5 mmol/L      Chloride 100 mmol/L      CO2 22.0 mmol/L      Calcium 9.0 mg/dL      eGFR Non African Amer 75 mL/min/1.73      BUN/Creatinine Ratio 13.3     Anion Gap 13.0 mmol/L     Narrative:       The MDRD GFR formula is only valid for adults with stable renal function between ages 18 and 70.    Hemoglobin & Hematocrit, Blood [[id number]]  (Abnormal) Collected:  05/01/18 0321    Specimen:  Blood Updated:  05/01/18 0413     Hemoglobin 11.8 (L) g/dL      Hematocrit 37.2 (L) %     Anaerobic Culture - Wound, Hip, Left [[id number]] Collected:  04/30/18 1402    Specimen:  Wound from Hip, Left Updated:  04/30/18 1429    Tissue Pathology Exam [[id number]] Collected:  04/30/18 1406    Specimen:  Tissue from Hip, Left Updated:  04/30/18 1413          Imaging Results (last 24 hours)     Procedure Component Value Units Date/Time    XR Hip 1 View Without Pelvis Left (Surgery Only) [[id number]] Collected:  04/30/18 1604     Updated:  04/30/18 1647    Narrative:       ONE VIEW PORTABLE LEFT HIP     HISTORY: Hip revision surgery.     Imaging in the operating room was performed at the time of hip revision  surgery and the alignment appears satisfactory. Two images were obtained  and " the fluoroscopy time measures 5 seconds.     This report was finalized on 4/30/2018 4:44 PM by Dr. Burton Canela MD.       XR Hip 1 View Without Pelvis Left (Surgery Only) [[id number]] Collected:  04/30/18 1628     Updated:  04/30/18 1631    Narrative:       XR HIP 1 VIEW WO PELVIS LEFT-     POSTOP PORTABLE SINGLE VIEW LEFT HIP     CLINICAL INFORMATION: Post arthroplasty     FINDINGS: Prosthesis is satisfactory in position. A complicating process  is not identified.     This report was finalized on 4/30/2018 4:28 PM by Dr. Lance Veronica M.D..             Patient Care Team:  Edmundo Reza MD as PCP - General (Family Medicine)  Tanisha Mosqueda MD (Cardiology)    SUBJECTIVE  comfortable  PHYSICAL EXAM   NV intact  Active Problems:    Degenerative joint disease (DJD) of hip      PLAN / DISPOSITION:  Arixtra one time dose  HH discharge today  Emphasize hip precautions in this revision situation  José Machado MD  05/01/18  6:48 AM

## 2018-05-01 NOTE — DISCHARGE SUMMARY
Discharge Summary   José Machado M.D.    NAME: José Moody ADMIT: 2018   : 1945  PCP: Edmundo Reza MD    MRN: [id number] LOS: 0 days   AGE/SEX: 72 y.o. male  ROOM: [facility]       Date of Discharge:  18    Primary Discharge Diagnosis: Painful left hip replacement due to femoral implant loosening  Secondary Discharge Diagnosis:    Problem List Items Addressed This Visit     Degenerative joint disease (DJD) of hip - Primary    Relevant Orders    Referral to home health      Other Visit Diagnoses     Painful hip        Relevant Orders    Anaerobic Culture - Wound, Hip, Left    Wound Culture - Wound, Hip, Left (Completed)    Tissue Pathology Exam          Procedures Performed:  Left Total Hip Arthroplasty      Hospital Course:    José Moody is a 72 y.o.  male who underwent successful Revision Left Total Hip Arthroplasty on 2018.  José Moody was started on Aspirin 325mg po daily immediately post-operatively for DVT prophylaxis.  On post-op day 1 the patients dressing was changed and their incision was clean, with no signs of infection and their calf was soft, with no signs of DVT.  The patient progressed well with physical therapy and the patients hemoglobin remained stable. On post-operative day 1 the patient was felt ready for discharge.      Total Hip Replacement Discharge Instructions:    I. ACTIVITIES:    1. Exercises:  ? Complete exercise program as taught by the hospital physical therapist 2 times per day  ? Exercise program will be advanced by the physical therapist  ? During the day be up ambulating every 2 hours (while awake) for short distances  ? Complete the ankle pump exercises at least 10 times per hour (while awake)  ? Elevate legs when in bed and for at least 30 minutes during the day.Use cold packs 20-30 minutes approximately 5 times per day. This should be done before and after completing your exercises and at any time you are experiencing pain/  stiffness in your operative extremity.    2. Activities of Daily Living:  ? No tub baths, hot tubs, or swimming pools for 4 weeks  ? May shower and let water run over the incision on post-operative day #5 if no drainage. Do not scrub or rub the incision. Simply let the water run over the incision and pat dry.    II. Precautions:  ? Everyone that comes near you should wash their hands  ? No elective dental, genital-urinary, or colon procedures or surgical procedures for 12 weeks after surgery unless absolutely necessary.  ? If dental work or surgical procedure is deemed absolutely necessary during the first 12 weeks, you will need to contact your surgeon as you will need to take antibiotics 1 hour prior to any dental work (including teeth cleanings).Please discuss with your surgeon prophylactic antibiotics as the length of time this intervention will be necessary for you varies with each patient’s health history and situation.  ? Avoid sick people. If you must be around someone who is ill, they should wear a mask.  ? Avoid visits to the Emergency Room or Urgent Care unless you are having a life threatening event.   ? If ordered stockings are to be placed on in the morning and removed at night. Monitor the stockings to ensure that any swelling is not causing the stockings to become too tight. In this case, remove stockings immediately.    III. INCISION CARE:    ? Wash your hands prior to dressing changes  ? Change the dressing as needed to keep incision clean and dry. Utilize dry gauze and paper tape. Avoid touching the side of the gauze that goes against the incision with your hands.  ? No creams or ointments to the incision  ? May remove dressing once the incision is free of drainage  ? Do not touch or pick at the incision  ? Check incision every day and notify surgeon immediately if any of the following signs or symptoms are noted:  o Increase in redness  o Increase in swelling around the incision and of the entire  extremity  o Increase in pain  o Drainage oozing from the incision  o Pulling apart of the edges of the incision  o Increase in overall body temperature (greater than 100.5 degrees)  ? Your surgeon will instruct you regarding suture or staple removal    IV. Medications:     1. Anticoagulants: You will be discharged on an anticoagulant. This is a prophylactic medication that helps prevent blood clots during your post-operative period. The type and length of dosage varies based on your individual needs, procedure performed, and surgeon’s preference.  ? While taking the anticoagulant, you should avoid taking any additional aspirin, ibuprofen (Advil or Motrin), Aleve (Naprosyn) or other non-steroidal anti-inflammatory medications.   ? Notify surgeon immediately if any rachelle bleeding is noted in the urine, stool, emesis, or from the nose or the incision. Blood in the stool will often appear as black rather than red. Blood in urine may appear as pink. Blood in emesis may appear as brown/black like coffee grounds.  ? You will need to apply pressure for longer periods of time to any cuts or abrasions to stop bleeding  ? Avoid alcohol while taking anticoagulants    2. Stool Softeners: You will be at greater risk of constipation after surgery due to being less mobile and the pain medications.   ? Take stool softeners as instructed by your surgeon while on pain medications. Bran cereal is most effective. Over the counter Colace 100 mg 1-2 capsules twice daily.   ? If stools become too loose or too frequent, please decreases the dosage or stop the stool softener.  ? If constipation occurs despite use of stool softeners, you are to continue the stool softeners and add a laxative (Milk of Magnesia 1 ounce daily as needed)  ? Drink plenty of fluids, and eat fruits and vegetables during your recovery time    3. Pain Medications utilized after surgery are narcotics and the law requires that the following information be given to all  patients that are prescribed narcotics:  ? CLASSIFICATION: Pain medications are called Opioids and are narcotics  ? LEGALITIES: It is illegal to share narcotics with others and to drive within 24 hours of taking narcotics  ? POTENTIAL SIDE EFFECTS: Potential side effects of opioids include: nausea, vomiting, itching, dizziness, drowsiness, dry mouth, constipation, and difficulty urinating.  ? POTENTIAL ADVERSE EFFECTS:   o Opioid tolerance can develop with use of pain medications and this simply means that it requires more and more of the medication to control pain; however, this is seen more in patients that use opioids for longer periods of time.  o Opioid dependence can develop with use of Opioids and this simply means that to stop the medication can cause withdrawal symptoms; however, this is seen with patients that use Opioids for longer periods of time.  o Opioid addiction can develop with use of Opioids and the incidence of this is very unlikely in patients who take the medications as ordered and stop the medications as instructed.  o Opioid overdose can be dangerous, but is unlikely when the medication is taken as ordered and stopped when ordered. It is important not to mix opioids with alcohol or with and type of sedative such as Benadryl as this can lead to over sedation and respiratory difficulty.  ? DOSAGE:   o Pain medications will need to be taken consistently for the first week to decrease pain and promote adequate pain relief and participation in physical therapy.  o After the initial surgical pain begins to resolve, you may begin to decrease the pain medication. By the end of 6-8 weeks, you should be off of pain medications.  o Refills will not be given by the office during evening hours, on weekends, or after 6-8 weeks post-op.  o To seek refills on pain medications during the initial 6 week post-operative period, you must call the office 48 hours in advance to request the refill. The office will  then notify you when to  the prescription. DO NOT wait until you are out of the medication to request a refill.    V. FOLLOW-UP VISITS:  ? You will need to follow up in the office with your surgeon in 3 weeks. Please call this number [phone]  to schedule this appointment.  If you have any concerns or suspected complications prior to your follow up visit, please call your surgeons office. Do not wait until your appointment time if you suspect complications. These will need to be addressed in the office promptly.    Discharge Medications:    1) Percocet 5/325 1-2 po q 4-6 hours prn pain  2)  Enteric Coated Aspirin 325 mg po daily for 6 weeks.      José Machado MD  5/1/2018  6:59 AM

## 2018-05-01 NOTE — PLAN OF CARE
Problem: Patient Care Overview  Goal: Plan of Care Review   05/01/18 1219   Coping/Psychosocial   Plan of Care Reviewed With patient   OTHER   Outcome Summary Pt s/p LTHA revision, edu and practiced using AE for LBD. Pt aware of precautions on lower body, would like to purchase after d/c if needed for ADLs. No further OT needs at this time.

## 2018-05-01 NOTE — PROGRESS NOTES
Discharge Planning Assessment   [facility]     Patient Name: José Moody  MRN: [id number]  Today's Date: 5/1/2018    Admit Date: 4/30/2018          Discharge Needs Assessment     Row Name 05/01/18 1141       Living Environment    Lives With child(alvin), adult    Name(s) of Who Lives With Patient Sylvia Ga, daughter    Current Living Arrangements home/apartment/condo    Primary Care Provided by self    Provides Primary Care For no one    Family Caregiver if Needed grandchild(alvin), adult    Quality of Family Relationships helpful;involved;supportive    Able to Return to Prior Arrangements yes       Resource/Environmental Concerns    Resource/Environmental Concerns none    Transportation Concerns car, none       Transition Planning    Patient/Family Anticipates Transition to home with help/services;home with family    Patient/Family Anticipated Services at Transition home health care    Transportation Anticipated family or friend will provide       Discharge Needs Assessment    Concerns to be Addressed discharge planning    Equipment Currently Used at Home walker, rolling;commode    Outpatient/Agency/Support Group Needs homecare agency    Discharge Facility/Level of Care Needs home with home health    [facility]            Discharge Plan     Row Name 05/01/18 1142       Plan    Plan [facility]    Patient/Family in Agreement with Plan yes    Plan Comments IMM letter checked. CCP spoke with pt re: d/c planning. Pt resides with his daughter, uses walker for mobility, and has 3-in-1 commode at home. Pt has used [facility] in the past and requests referral for d/c. CCP made referral to Elizabeth who is aware of d/c today. Pt denies additional needs. Raysa Manzanares [facility]        Destination     No service coordination in this encounter.      Durable Medical Equipment     No service coordination in this encounter.      Dialysis/Infusion     No service coordination in this  encounter.      Home Medical Care - Selection Complete     Service Request Status Selected Specialties Address Phone Number Fax Number    [facility] [address], [address], [place] [facility] [address] [phone]      Social Care     No service coordination in this encounter.        Expected Discharge Date and Time     Expected Discharge Date Expected Discharge Time    May 1, 2018               Demographic Summary     Row Name 05/01/18 1139       General Information    Admission Type inpatient    Arrived From home    Required Notices Provided Important Message from Medicare    Referral Source nursing;physician    Reason for Consult discharge planning    Preferred Language English            Functional Status     Row Name 05/01/18 1141       Functional Status    Usual Activity Tolerance good    Current Activity Tolerance fair       Functional Status, IADL    Medications independent    Meal Preparation independent    Housekeeping independent    Laundry independent    Shopping independent       Mental Status Summary    Recent Changes in Mental Status/Cognitive Functioning no changes            Psychosocial    No documentation.           Abuse/Neglect    No documentation.           Legal    No documentation.           Substance Abuse    No documentation.           Patient Forms    No documentation.         Edith Manzanares LCSW

## 2018-05-01 NOTE — PLAN OF CARE
Problem: Patient Care Overview  Goal: Plan of Care Review  Outcome: Ongoing (interventions implemented as appropriate)   05/01/18 0413   Coping/Psychosocial   Plan of Care Reviewed With patient   Plan of Care Review   Progress improving   OTHER   Outcome Summary po pain medication helping with pain. voiding fine. ambulating well with 1 assist. education provided on ponv and medication avalible if needed.     Goal: Individualization and Mutuality  Outcome: Ongoing (interventions implemented as appropriate)    Goal: Discharge Needs Assessment  Outcome: Ongoing (interventions implemented as appropriate)      Problem: Fall Risk (Adult)  Goal: Absence of Fall  Outcome: Ongoing (interventions implemented as appropriate)

## 2018-05-01 NOTE — PLAN OF CARE
Problem: Patient Care Overview  Goal: Plan of Care Review   05/01/18 0910   Coping/Psychosocial   Plan of Care Reviewed With patient   OTHER   Outcome Summary Pt presents s/p L OZIEL revision, demonstrating post op pain, L hip weakness, and impaired gait mechanics limiting mobility. Pt was active and independent with cane prior to admission; currently requiring use of walker and assist of one due to impairments. Pt ambulated in hallway with SBA; negotiated stairs with wx and CGA. Pt demonstrates good understanding of HEP, hip precautions, and home safety. Plans to DC home today; no further acute PT concerns. Recommend continued HH PT for further strengthening and gait training.

## 2018-05-01 NOTE — DISCHARGE INSTR - ACTIVITY
General Information: The length of hospital stay after knee and hip replacement surgery has, over the last several years,  decreased significantly. Reasons for this include concerns regarding costs and attempts to keep those costs down. However, as surgeons have improved their surgical techniques, as those techniques have become less invasive, and as pain management has improved with the Mormon of longer acting blocks that do not interfere with early Physical Therapy, safe early hospital discharge has become common place.    Discharge Home vs Rehab: Though it may seem intuitive that being discharged from the hospital to a rehab facility would be advantageous due to the seeming greater availability of physical therapy, the reverse is actually true. When patients return to the office at the 3-week post op jesus, those patients who have been discharged to their home environment consistently out preform those patients who spent time in an inpatient rehab facility. There are several reasons for this. First, home health nursing and physical therapy services have over the years become cutting edge. Second, when placed in the home environment, patients inevitably will do more for themselves in terms of self-care and mobilization. The ‘getting up and doing for oneself’ is in and of itself, physical therapy. Even the task of managing stairs at home is a form of therapy. Those patients who go home after a brief hospital stay typically are walking better, have more of a spring in their step, are functioning more independently, and are closer to getting back to a normal lifestyle than those who have spent time in an inpatient rehab facility. In addition, my mantra is this: if you don’t want to get sick, stay away from places where sick people are. Going home is the safest and best option after hospital discharge for any patient who is not totally alone and can arrange for any kind of help at home (this is another reason, by  the way ,to try to get out of the hospital sooner rather than later).    Specific Post Op Instructions:  Blood Thinners: All patients will be on some type of blood thinner post op to prevent blood clots. Often a blood thinning shot is used while in the hospital. Most patients who are not high risk are discharged on aspirin (either 325 mg or 81 mg depending on age and size). High risk patients may be discharged on a more potent oral or injectable form of a blood thinner. Keep in mind that the more aggressive the blood thinner used, the greater the risk of bleeding complications post op. This is always a balancing act.  Ice: Early post op until the swelling diminishes, ice should be applied to the knee for 30 minutes out of every 2 hours while awake.  Staples: Staples are taken out at 14 days post op. Usually the nurse at home will perform this task.  Dressing Changes: If the wound is clean and dry with no redness or drainage, the dressings can be discontinued on the 3rd or 4th day post op.  Showering: The edges of the wound take 3 days after surgery to seal. Given the magnitude of hip and knee replacement surgery, I always like to build in a safety margin in  terms of getting the wound wet. Thus waiting till the 5th -7th day post op is recommended before showering. Do not soak the wound in water till the staples are out and the staple puncture wounds have healed. If there is any redness or drainage, the wound should be kept dry and covered by a sterile dressing until this resolves.  Weight Bearing/Ambulatory Aids: Most hip and knee replacments are implanted such that full immediate weight bearing is allowed. The walker and cane can be discarded when tolerated. There are a few special circumstances where 6 weeks of protected weight bearing may be required.  Follow-up Appointment: The first follow-up office appointment is at the 3-week jesus post op. You need to call to set up this appointment. We want to see you back  sooner if there are any problems.  Physical Therapy: Generally about 6 weeks of therapy is required after hip and knee replacement surgery. Hips usually require less PT than do knees. Usually the first 3 weeks or so of PT is done at home. Knees especially often need to follow that up with 3 weeks of outpatient therapy.  Driving a Car: With left leg surgery, driving is permitted when the patient is off of pain medication and feels sufficiently alert and strong enough to physically handle a car safely. Due to liability issues, it is generally recommended to not drive until 6 weeks post op after right leg surgery.  Returning to Daily and Recreational Activities: For the most part, patients can progress to daily activities as tolerated. Initially, it is best not to “overdo it” in an attempt to minimize early post op swelling. Once the swelling is controlled, the patient may progress as tolerated. It usually is 6 weeks before patients feel up to most all daily activities, and 3 months before feeling up to more vigorous physical activities. A golfer might start back playing at about 6 weeks. A  would probably not feel up to resuming playing until 3 months.  Return to Work: My basic premise is this: I am not the work police. I try to implant both knee and hip replacements such that it is safe to perform any activity that the patient can tolerate and then let the patient decide. The average time until returning to a sedentary job is 6 weeks. The average time until returning to a physical job is 3 months. There is great variability. The return to work date depends on many factors. It often depends in part on the patient’s perceived need to work, flexibility of demands in the work place environment, and other social and physical factors. Suffice to say that we will provide you with a note to return to work whenever you think you can manage whatever it is you will face on returning to work.

## 2018-05-01 NOTE — THERAPY DISCHARGE NOTE
Acute Care - Physical Therapy Initial Eval/Discharge  [facility]     Patient Name: José Moody  : 1945  MRN: [id number]  Today's Date: 2018   Onset of Illness/Injury or Date of Surgery: 18     Referring Physician: Carter      Admit Date: 2018    Visit Dx:    ICD-10-CM ICD-9-CM   1. Primary osteoarthritis of hip, unspecified laterality M16.10 715.15   2. Painful hip M25.559 719.45   3. Decreased mobility R26.89 781.99     Patient Active Problem List   Diagnosis   • Abnormal thallium stress test   • Atrial fibrillation   • S/P CABG (coronary artery bypass graft)   • Chest pain   • Hyperlipidemia   • Shortness of breath   • Weak pulse   • SOB (shortness of breath)   • Chronic coronary artery disease   • Degenerative joint disease (DJD) of hip   • Palpitations   • Bilateral pulmonary infiltrates on chest x-ray     Past Medical History:   Diagnosis Date   • Anxiety    • Arthritis     LT HIP   • Atrial fibrillation    • Coronary artery disease    • High cholesterol    • History of atrial fibrillation     LAST HAD    • Limited joint range of motion     LT HIP   • PONV (postoperative nausea and vomiting)     AFTER TONSILLECTOMY     Past Surgical History:   Procedure Laterality Date   • BRONCHOSCOPY Bilateral 2017    Procedure: BRONCHOSCOPY WITH BAL RIGHT AND LEFT UPPER LOBE;  Surgeon: Alin Stallworth MD;  Location: [facility] ENDOSCOPY;  Service:    • CARDIAC SURGERY     • CARPAL TUNNEL RELEASE Right    • COLONOSCOPY     • CORONARY ARTERY BYPASS GRAFT      3 VESSELS    • JOINT REPLACEMENT     • TONSILLECTOMY     • TOTAL HIP ARTHROPLASTY Left 3/9/2017    Procedure: LT TOTAL HIP ARTHROPLASTY;  Surgeon: José Machado MD;  Location: [facility] MAIN OR;  Service:    • TOTAL HIP ARTHROPLASTY REVISION Left 2018    Procedure: LT FEMORAL REVISION;  Surgeon: José Machado MD;  Location: [facility] OR;  Service: Orthopedics          PT ASSESSMENT (last 12 hours)       Physical Therapy Evaluation     Row Name 05/01/18 0840          PT Evaluation Time/Intention    Subjective Information complains of;pain  -EE     Document Type evaluation;discharge evaluation/summary  -EE     Patient Effort good  -EE     Symptoms Noted During/After Treatment none  -EE     Row Name 05/01/18 0840          General Information    Onset of Illness/Injury or Date of Surgery 04/30/18  -EE     Referring Physician Sweet  -EE     Patient Observations alert;cooperative;agree to therapy  -EE     Patient/Family Observations Pt sitting up in chair in no acute distress  -EE     Prior Level of Function independent:;all household mobility;community mobility  -EE     Equipment Currently Used at Home cane, straight   has wx and bsc; not using prior  -EE     Pertinent History of Current Functional Problem s/p L OZIEL revision  -EE     Existing Precautions/Restrictions fall;left;hip, posterior  -EE     Barriers to Rehab none identified  -EE     Row Name 05/01/18 0840          Relationship/Environment    Lives With child(alvin), adult  -EE     Row Name 05/01/18 0840          Home Main Entrance    Number of Stairs, Main Entrance two  -EE     Stair Railings, Main Entrance none  -EE     Row Name 05/01/18 0840          Cognitive Assessment/Interventions    Additional Documentation Cognitive Assessment/Intervention (Group)  -EE     Row Name 05/01/18 0840          Cognitive Assessment/Intervention- PT/OT    Orientation Status (Cognition) oriented x 4  -EE     Follows Commands (Cognition) WNL  -EE     Personal Safety Interventions fall prevention program maintained;gait belt;supervised activity;muscle strengthening facilitated;nonskid shoes/slippers when out of bed  -EE     Row Name 05/01/18 0840          Safety Issues, Functional Mobility    Impairments Affecting Function (Mobility) pain;strength;endurance/activity tolerance  -EE     Row Name 05/01/18 0840          Bed Mobility Assessment/Treatment    Comment (Bed Mobility) pt  verbalizes independence with bed mobility earlier today  -EE     Row Name 05/01/18 0840          Transfer Assessment/Treatment    Transfer Assessment/Treatment stand-sit transfer;sit-stand transfer  -EE     Sit-Stand [place] (Transfers) contact guard  -EE     Stand-Sit [place] (Transfers) contact guard  -EE     Row Name 05/01/18 0840          Sit-Stand Transfer    Assistive Device (Sit-Stand Transfers) walker, front-wheeled  -EE     Row Name 05/01/18 0840          Stand-Sit Transfer    Assistive Device (Stand-Sit Transfers) walker, front-wheeled  -EE     Row Name 05/01/18 0840          Gait/Stairs Assessment/Training    [place] Level (Gait) contact guard;stand by assist  -EE     Assistive Device (Gait) walker, front-wheeled  -EE     Distance in Feet (Gait) 150  -EE     Pattern (Gait) step-through  -EE     Deviations/Abnormal Patterns (Gait) antalgic;david decreased  -EE     Left Sided Gait Deviations weight shift ability decreased  -EE     [place] Level (Stairs) contact guard;verbal cues  -EE     Assistive Device (Stairs) walker, front-wheeled  -EE     Handrail Location (Stairs) none  -EE     Number of Steps (Stairs) 3  -EE     Ascending Technique (Stairs) step-to-step  -EE     Descending Technique (Stairs) step-to-step  -EE     Comment (Gait/Stairs) ascending stairs backward w/wx; cues for sequencing  -EE     Row Name 05/01/18 0840          General ROM    GENERAL ROM COMMENTS L hip limited by pain and hip precautions; R LE WFL  -EE     Row Name 05/01/18 0840          General Assessment (Manual Muscle Testing)    General Manual Muscle Testing (MMT) Assessment lower extremity strength deficits identified  -EE     Row Name 05/01/18 0840          Lower Extremity (Manual Muscle Testing)    Comment, MMT: Lower Extremity L hip 3-/5; R LE grossly 4+/5  -EE     Row Name 05/01/18 0840          Motor Assessment/Intervention    Additional Documentation Therapeutic Exercise Interventions (Group)  -EE      Row Name 05/01/18 0840          Therapeutic Exercise    Comment (Therapeutic Exercise) 10 reps L OZIEL completed  -EE     Row Name 05/01/18 0840          Sensory Assessment/Intervention    Sensory General Assessment no sensation deficits identified  -EE     Row Name 05/01/18 0840          Pain Assessment    Additional Documentation Pain Scale: Numbers Pre/Post-Treatment (Group)  -EE     Row Name 05/01/18 0840          Pain Scale: Numbers Pre/Post-Treatment    Pain Scale: Numbers, Pretreatment 5/10  -EE     Pain Location - Side Left  -EE     Pain Location hip  -EE     Pain Intervention(s) Repositioned;Elevated;Cold applied;Medication (See MAR)  -EE     Row Name             Wound 04/30/18 1417 Left hip incision    Wound - Properties Group Date first assessed: 04/30/18  -AC Time first assessed: 1417  -AC Side: Left  -AC Location: hip  -AC Type: incision  -AC    Row Name 05/01/18 0840          Plan of Care Review    Plan of Care Reviewed With patient  -EE     Row Name 05/01/18 0840          Physical Therapy Clinical Impression    Patient/Family Goals Statement (PT Clinical Impression) Go home  -EE     Criteria for Skilled Interventions Met (PT Clinical Impression) yes;treatment indicated  -EE     Pathology/Pathophysiology Noted (Describe Specifically for Each System) musculoskeletal  -EE     Impairments Found (describe specific impairments) gait, locomotion, and balance  -EE     Rehab Potential (PT Clinical Summary) good, to achieve stated therapy goals  -EE     Row Name 05/01/18 0840          Positioning and Restraints    Pre-Treatment Position sitting in chair/recliner  -EE     Post Treatment Position chair  -EE     In Chair reclined;call light within reach;encouraged to call for assist;with family/caregiver;notified nsg;legs elevated  -EE     Row Name 05/01/18 0840          Living Environment    Home Accessibility stairs to enter home  -EE       User Key  (r) = Recorded By, (t) = Taken By, (c) = Cosigned By    Initials  Name Provider Type    AC Melissa Jean Baptiste, RN Registered Nurse     Katherin Hale PT Physical Therapist          Physical Therapy Education     Title: PT OT SLP Therapies (Resolved)     Topic: Physical Therapy (Resolved)     Point: Mobility training (Resolved)    Learning Progress Summary     Learner Status Readiness Method Response Comment Documented by    Patient Done [place] D VU,TERRANCE   05/01/18 0910          Point: Home exercise program (Resolved)    Learning Progress Summary     Learner Status Readiness Method Response Comment Documented by    Patient Done [place]Y [place] D VU,TERRANCE   05/01/18 0910          Point: Body mechanics (Resolved)    Learning Progress Summary     Learner Status Readiness Method Response Comment Documented by    Patient Done [place] D VU,TERRANCE   05/01/18 0910          Point: Precautions (Resolved)    Learning Progress Summary     Learner Status Readiness Method Response Comment Documented by    Patient Done [place]Y [place] D VU,TERRANCE   05/01/18 0910                      User Key     Initials Effective Dates Name Provider Type Discipline     04/03/18 -  Katherin Hale PT Physical Therapist PT                PT Recommendation and Plan  Anticipated Discharge Disposition (PT): home with home health care  Therapy Frequency (PT Clinical Impression): evaluation only  Outcome Summary/Treatment Plan (PT)  Anticipated Discharge Disposition (PT): home with home health care  Plan of Care Reviewed With: patient  Outcome Summary: Pt presents s/p L OZIEL revision, demonstrating post op pain, L hip weakness, and impaired gait mechanics limiting mobility. Pt was active and independent with cane prior to admission; currently requiring use of walker and assist of one due to impairments. Pt ambulated in hallway with SBA; negotiated stairs with wx and CGA. Pt demonstrates good understanding of HEP, hip precautions, and home safety. Plans to DC home today; no further acute PT concerns. Recommend continued  PT for  further strengthening and gait training.           Outcome Measures     Row Name 05/01/18 0900             How much help from another person do you currently need...    Turning from your back to your side while in flat bed without using bedrails? 4  -EE      Moving from lying on back to sitting on the side of a flat bed without bedrails? 4  -EE      Moving to and from a bed to a chair (including a wheelchair)? 3  -EE      Standing up from a chair using your arms (e.g., wheelchair, bedside chair)? 3  -EE      Climbing 3-5 steps with a railing? 3  -EE      To walk in hospital room? 3  -EE      AM-PAC 6 Clicks Score 20  -EE         Functional Assessment    Outcome Measure Options AM-PAC 6 Clicks Basic Mobility (PT)  -EE        User Key  (r) = Recorded By, (t) = Taken By, (c) = Cosigned By    Initials Name Provider Type    EE Katherin Hale PT Physical Therapist           Time Calculation:         PT Charges     Row Name 05/01/18 0912             Time Calculation    Start Time 0840  -EE      Stop Time 0907  -EE      Time Calculation (min) 27 min  -EE      PT Received On 05/01/18  -EE        User Key  (r) = Recorded By, (t) = Taken By, (c) = Cosigned By    Initials Name Provider Type    EE Katherin Hale PT Physical Therapist          Therapy Charges for Today     Code Description Service Date Service Provider Modifiers Qty    93060039232 HC PT MOBILITY CURRENT 5/1/2018 Katherin Hale PT GP, CJ 1    26111161083 HC PT MOBILITY PROJECTED 5/1/2018 Katherin Hale PT GP,  1    97893638563 HC PT MOBILITY DISCHARGE 5/1/2018 Katherin Hale PT GP, CJ 1    68382687985 HC PT EVAL LOW COMPLEXITY 1 5/1/2018 Katherin Hale PT GP 1    41177432373 HC PT THER PROC EA 15 MIN 5/1/2018 Katherin Hale PT GP 2    87776199989 HC PT THER SUPP EA 15 MIN 5/1/2018 Katherin Hale PT GP 2          PT G-Codes  Outcome Measure Options: AM-PAC 6 Clicks Basic Mobility (PT)  Functional Limitation: Mobility: Walking and moving around  Mobility: Walking and Moving  Around Current Status (): At least 20 percent but less than 40 percent impaired, limited or restricted  Mobility: Walking and Moving Around Goal Status (): At least 20 percent but less than 40 percent impaired, limited or restricted  Mobility: Walking and Moving Around Discharge Status (): At least 20 percent but less than 40 percent impaired, limited or restricted    PT Discharge Summary  Anticipated Discharge Disposition (PT): home with home health care  Reason for Discharge: Discharge from facility  Outcomes Achieved: Discharge from facility occurred on same date as evluation  Discharge Destination: Home with home health    Katherin Hale, PT  5/1/2018

## 2018-05-01 NOTE — DISCHARGE PLACEMENT REQUEST
"José Moody (72 y.o. Male)     Date of Birth Social Security Number Address Home Phone MRN    1945  [id number]  [facility] [phone] [id number]    [employer] Marital Status          Unknown Single       Admission Date Admission Type Admitting Provider Attending Provider Department, Room/Bed    4/30/18 Elective José Machado MD Sweet, Richard A, MD [facility] [address], P882/1    Discharge Date Discharge Disposition Discharge Destination         Home or Self Care              Attending Provider:  José Machado MD    Allergies:  No Known Allergies    Isolation:  None   Infection:  None   Code Status:  FULL    Ht:  167.6 cm (66\")   Wt:  84.8 kg (187 lb)    Admission Cmt:  None   Principal Problem:  None                Active Insurance as of 4/30/2018     Primary Coverage     Payor Plan Insurance Group Employer/Plan Group    MEDICARE MEDICARE A & B      Payor Plan Address Payor Plan Phone Number Effective From Effective To    [address] [phone] 6/1/2010     [place] [address]       Subscriber Name Subscriber Birth Date Member ID       JOSÉ MOODY 1945 [id number]           Secondary Coverage     Payor Plan Insurance Group Employer/Plan Group    AAR MED SUPP AAR HEALTH CARE OPTIONS      Payor Plan Address Payor Plan Phone Number Effective From Effective To    [facility] [phone] 1/1/2016     [address]       [place] [address]       Subscriber Name Subscriber Birth Date Member ID       JOSÉ MOODY 1945 [id number]                 Emergency Contacts      (Rel.) Home Phone Work Phone Mobile Phone    Yazmin Moody (Daughter) -- -- [phone]              "

## 2018-05-01 NOTE — THERAPY DISCHARGE NOTE
Acute Care - Occupational Therapy Initial Eval/Discharge   [facility]     Patient Name: José Moody  : 1945  MRN: [id number]  Today's Date: 2018  Onset of Illness/Injury or Date of Surgery: 18     Referring Physician: Carter      Admit Date: 2018       ICD-10-CM ICD-9-CM   1. Primary osteoarthritis of hip, unspecified laterality M16.10 715.15   2. Painful hip M25.559 719.45   3. Decreased mobility R26.89 781.99     Patient Active Problem List   Diagnosis   • Abnormal thallium stress test   • Atrial fibrillation   • S/P CABG (coronary artery bypass graft)   • Chest pain   • Hyperlipidemia   • Shortness of breath   • Weak pulse   • SOB (shortness of breath)   • Chronic coronary artery disease   • Degenerative joint disease (DJD) of hip   • Palpitations   • Bilateral pulmonary infiltrates on chest x-ray     Past Medical History:   Diagnosis Date   • Anxiety    • Arthritis     LT HIP   • Atrial fibrillation    • Coronary artery disease    • High cholesterol    • History of atrial fibrillation     LAST HAD    • Limited joint range of motion     LT HIP   • PONV (postoperative nausea and vomiting)     AFTER TONSILLECTOMY     Past Surgical History:   Procedure Laterality Date   • BRONCHOSCOPY Bilateral 2017    Procedure: BRONCHOSCOPY WITH BAL RIGHT AND LEFT UPPER LOBE;  Surgeon: Alin Stallworth MD;  Location: [facility] ENDOSCOPY;  Service:    • CARDIAC SURGERY     • CARPAL TUNNEL RELEASE Right    • COLONOSCOPY     • CORONARY ARTERY BYPASS GRAFT      3 VESSELS    • JOINT REPLACEMENT     • TONSILLECTOMY     • TOTAL HIP ARTHROPLASTY Left 3/9/2017    Procedure: LT TOTAL HIP ARTHROPLASTY;  Surgeon: José Machado MD;  Location: [facility] MAIN OR;  Service:    • TOTAL HIP ARTHROPLASTY REVISION Left 2018    Procedure: LT FEMORAL REVISION;  Surgeon: José Macahdo MD;  Location: [facility] OR;  Service: Orthopedics          OT ASSESSMENT FLOWSHEET (last 72 hours)       Occupational Therapy Evaluation     Row Name 05/01/18 1208                   OT Evaluation Time/Intention    Subjective Information no complaints  -SO        Document Type evaluation;discharge evaluation/summary  -SO        Mode of Treatment individual therapy;occupational therapy  -SO        Patient Effort excellent  -SO        Symptoms Noted During/After Treatment none  -SO           General Information    Patient Profile Reviewed? yes  -SO        Referring Physician Sweet  -SO        Patient Observations alert;cooperative;agree to therapy  -SO        Patient/Family Observations Pt sitting up in chair  -SO        Prior Level of Function independent:;ADL's  -SO        Equipment Currently Used at Home cane, straight  -SO        Pertinent History of Current Functional Problem LTHA  -SO        Existing Precautions/Restrictions fall;left;hip, posterior  -SO        Barriers to Rehab none identified  -SO           Relationship/Environment    Lives With child(alvin), adult  -SO           Cognitive Assessment/Intervention- PT/OT    Orientation Status (Cognition) oriented x 4  -SO        Follows Commands (Cognition) WNL  -SO        Personal Safety Interventions fall prevention program maintained  -SO           ADL Assessment/Intervention    BADL Assessment/Intervention lower body dressing  -SO           Lower Body Dressing Assessment/Training    Lower Body Dressing [place] Level doff;don;socks;set up;verbal cues  -SO        Assistive Devices (Lower Body Dressing) reacher;sock-aid  -SO        Lower Body Dressing Position supported sitting  -SO           General ROM    GENERAL ROM COMMENTS BUE WFL  -SO           General Assessment (Manual Muscle Testing)    Comment, General Manual Muscle Testing (MMT) Assessment BUE WFL  -SO           Positioning and Restraints    Pre-Treatment Position sitting in chair/recliner  -SO        Post Treatment Position chair  -SO        In Chair sitting;call light within reach;encouraged to  call for assist  -SO           Pain Scale: Numbers Pre/Post-Treatment    Pain Scale: Numbers, Pretreatment 4/10  -SO        Pain Location - Side Left  -SO        Pain Location hip  -SO           Wound 04/30/18 1417 Left hip incision    Wound - Properties Group Date first assessed: 04/30/18  -AC Time first assessed: 1417  -AC Side: Left  -AC Location: hip  -AC Type: incision  -AC       OT Goals    Dressing Goal Selection (OT) dressing, OT goal 1  -SO           Dressing Goal 1 (OT)    Activity/Assistive Device (Dressing Goal 1, OT) lower body dressing;reacher;sock-aid;long handled shoe horn  -SO        [place]/Cues Needed (Dressing Goal 1, OT) set-up required;verbal cues required  -SO        Time Frame (Dressing Goal 1, OT) short term goal (STG);1 day  -SO        Progress/Outcome (Dressing Goal 1, OT) goal met  -SO          User Key  (r) = Recorded By, (t) = Taken By, (c) = Cosigned By    Initials Name Effective Dates    SO KOMAL Jarrell 04/13/15 -     AC Melissa Jean Baptiste RN 06/16/16 -           Occupational Therapy Education     Title: PT OT SLP Therapies (Active)     Topic: Occupational Therapy (Active)     Point: ADL training (Done)     Description: Instruct learner(s) on proper safety adaptation and remediation techniques during self care or transfers.   Instruct in proper use of assistive devices.   Learning Progress Summary     Learner Status Readiness Method Response Comment Documented by    Patient Done Acceptance E VU Pt edu on hip kit and AE for LBD, aware of precautions for LB. SO 05/01/18 1216                      User Key     Initials Effective Dates Name Provider Type Discipline    SO 04/13/15 -  Carol Peralta OTR Occupational Therapist OT                OT Recommendation and Plan     Plan of Care Review  Plan of Care Reviewed With: patient  Plan of Care Reviewed With: patient  Outcome Summary: Pt s/p LTHA revision, edu and practiced using AE for LBD. Pt aware of precautions  on lower body, would like to purchase after d/c if needed for ADLs. No further OT needs at this time.           OT Rehab Goals     Row Name 05/01/18 1208             Dressing Goal 1 (OT)    Activity/Assistive Device (Dressing Goal 1, OT) lower body dressing;reacher;sock-aid;long handled shoe horn  -SO      [place]/Cues Needed (Dressing Goal 1, OT) set-up required;verbal cues required  -SO      Time Frame (Dressing Goal 1, OT) short term goal (STG);1 day  -SO      Progress/Outcome (Dressing Goal 1, OT) goal met  -SO        User Key  (r) = Recorded By, (t) = Taken By, (c) = Cosigned By    Initials Name Provider Type Discipline    SO Carol Prealta, OTR Occupational Therapist OT                Outcome Measures     Row Name 05/01/18 1200 05/01/18 0900          How much help from another person do you currently need...    Turning from your back to your side while in flat bed without using bedrails?  -- 4  -EE     Moving from lying on back to sitting on the side of a flat bed without bedrails?  -- 4  -EE     Moving to and from a bed to a chair (including a wheelchair)?  -- 3  -EE     Standing up from a chair using your arms (e.g., wheelchair, bedside chair)?  -- 3  -EE     Climbing 3-5 steps with a railing?  -- 3  -EE     To walk in hospital room?  -- 3  -EE     AM-PAC 6 Clicks Score  -- 20  -EE        How much help from another is currently needed...    Putting on and taking off regular lower body clothing? 3  -SO  --     Bathing (including washing, rinsing, and drying) 3  -SO  --     Toileting (which includes using toilet bed pan or urinal) 3  -SO  --     Putting on and taking off regular upper body clothing 4  -SO  --     Taking care of personal grooming (such as brushing teeth) 4  -SO  --     Eating meals 4  -SO  --     Score 21  -SO  --        Functional Assessment    Outcome Measure Options AM-PAC 6 Clicks Daily Activity (OT)  -SO AM-PAC 6 Clicks Basic Mobility (PT)  -EE       User Key  (r) = Recorded  By, (t) = Taken By, (c) = Cosigned By    Initials Name Provider Type    SO Carol Peralta OTR Occupational Therapist    SAAD Hale, PT Physical Therapist          Time Calculation:         Time Calculation- OT     Row Name 05/01/18 1220             Time Calculation- OT    OT Start Time 1030  -SO      OT Stop Time 1046  -SO      OT Time Calculation (min) 16 min  -SO      OT Received On 05/01/18  -SO        User Key  (r) = Recorded By, (t) = Taken By, (c) = Cosigned By    Initials Name Provider Type    RUFINA Peralta OTSHANE Occupational Therapist          Therapy Charges for Today     Code Description Service Date Service Provider Modifiers Qty    55033001447 HC OT EVAL LOW COMPLEXITY 2 5/1/2018 KOMAL Jarrell GO 1    96541497251 HC OT SELF CARE/MGMT/TRAIN EA 15 MIN 5/1/2018 KOMAL Jarrell GO 1                    KOMAL Jarrell  5/1/2018

## 2018-05-03 LAB
BACTERIA SPEC AEROBE CULT: NORMAL
GRAM STN SPEC: NORMAL
GRAM STN SPEC: NORMAL

## 2018-05-05 LAB — BACTERIA SPEC ANAEROBE CULT: NORMAL

## 2018-07-03 RX ORDER — METOPROLOL TARTRATE 50 MG/1
TABLET, FILM COATED ORAL
Qty: 180 TABLET | Refills: 0 | Status: SHIPPED | OUTPATIENT
Start: 2018-07-03 | End: 2018-09-17 | Stop reason: ALTCHOICE

## 2018-08-20 ENCOUNTER — OFFICE VISIT (OUTPATIENT)
Dept: CARDIOLOGY | Facility: CLINIC | Age: 73
End: 2018-08-20

## 2018-08-20 VITALS
DIASTOLIC BLOOD PRESSURE: 73 MMHG | SYSTOLIC BLOOD PRESSURE: 119 MMHG | WEIGHT: 193 LBS | HEIGHT: 66 IN | HEART RATE: 67 BPM | BODY MASS INDEX: 31.02 KG/M2

## 2018-08-20 DIAGNOSIS — Z95.1 S/P CABG (CORONARY ARTERY BYPASS GRAFT): ICD-10-CM

## 2018-08-20 DIAGNOSIS — R00.2 PALPITATION: ICD-10-CM

## 2018-08-20 DIAGNOSIS — R94.31 ABNORMAL EKG: ICD-10-CM

## 2018-08-20 DIAGNOSIS — R00.2 PALPITATIONS: ICD-10-CM

## 2018-08-20 DIAGNOSIS — I48.0 PAROXYSMAL ATRIAL FIBRILLATION (HCC): Primary | ICD-10-CM

## 2018-08-20 DIAGNOSIS — I25.10 CHRONIC CORONARY ARTERY DISEASE: ICD-10-CM

## 2018-08-20 PROCEDURE — 99214 OFFICE O/P EST MOD 30 MIN: CPT | Performed by: INTERNAL MEDICINE

## 2018-08-20 PROCEDURE — 93000 ELECTROCARDIOGRAM COMPLETE: CPT | Performed by: INTERNAL MEDICINE

## 2018-08-20 RX ORDER — TERAZOSIN 10 MG/1
10 CAPSULE ORAL DAILY
Refills: 3 | COMMUNITY
Start: 2018-07-06

## 2018-08-20 NOTE — PROGRESS NOTES
Subjective:       José Moody is a 73 y.o. male who here for follow up    CC  afib getting worse at night times, 3 times a night , lasting 3-5 min  HPI  73-year-old male with known history of the coronary artery disease along with a coronary artery bypass graft surgery palpitation as well as atrial fibrillation has been complaining that the atrial Coblation is getting worse almost every night 3 times a night usually lasting for 3 minutes over the several weeks gradually worsening associated with the shortness of breath and anxiety     Problem List Items Addressed This Visit        Cardiovascular and Mediastinum    Atrial fibrillation (CMS/HCC) - Primary    Relevant Orders    ECG 12 Lead    Holter Monitor - 72 Hour Up To 21 Days    Chronic coronary artery disease    Palpitations       Other    S/P CABG (coronary artery bypass graft)      Other Visit Diagnoses     Abnormal EKG        Relevant Orders    Holter Monitor - 72 Hour Up To 21 Days    Palpitation        Relevant Orders    Holter Monitor - 72 Hour Up To 21 Days        .    The following portions of the patient's history were reviewed and updated as appropriate: allergies, current medications, past family history, past medical history, past social history, past surgical history and problem list.    Past Medical History:   Diagnosis Date   • Anxiety    • Arthritis     LT HIP   • Atrial fibrillation (CMS/HCC)    • Coronary artery disease    • High cholesterol    • History of atrial fibrillation     LAST HAD 1-2016   • Limited joint range of motion     LT HIP   • PONV (postoperative nausea and vomiting)     AFTER TONSILLECTOMY    reports that he quit smoking about 17 years ago. His smoking use included Cigarettes. He has a 13.50 pack-year smoking history. He has never used smokeless tobacco. He reports that he does not drink alcohol or use drugs.  Family History   Problem Relation Age of Onset   • Heart failure Brother    • Heart disease Brother    • Heart  "attack Brother    • Norma Hyperthermia Neg Hx    • Hypertension Neg Hx    • Hyperlipidemia Neg Hx        Review of Systems  Constitutional: No wt loss, fever, fatigue  Gastrointestinal: No nausea, abdominal pain  Behavioral/Psych: No insomnia or anxiety   Cardiovascular shortness of breath  Objective:                 Physical Exam  /73   Pulse 67   Ht 167.6 cm (66\")   Wt 87.5 kg (193 lb)   BMI 31.15 kg/m²     General appearance: NAD, conversant   Eyes: anicteric sclerae, moist conjunctivae; no lid-lag; PERRLA   HENT: Atraumatic; oropharynx clear with moist mucous membranes and no mucosal ulcerations;  normal hard and soft palate   Neck: Trachea midline; FROM, supple, no thyromegaly or lymphadenopathy   Lungs: CTA, with normal respiratory effort and no intercostal retractions   CV: S1-S2 regular, no murmurs, no rub, no gallop   Abdomen: Soft, non-tender; no masses or HSM   Extremities: No peripheral edema or extremity lymphadenopathy  Skin: Normal temperature, turgor and texture; no rash, ulcers or subcutaneous nodules   Psych: Appropriate affect, alert and oriented to person, place and time           Cardiographics  @  ECG 12 Lead  Date/Time: 8/20/2018 11:09 AM  Performed by: EDGARDO BUCKLEY  Authorized by: EDGARDO BUCKLEY   Comparison: compared with previous ECG   Similar to previous ECG  Rhythm: sinus rhythm  ST Flattening: all  Clinical impression: non-specific ECG            Echocardiogram:        Current Outpatient Prescriptions:   •  atorvastatin (LIPITOR) 20 MG tablet, Take 20 mg by mouth Every Evening., Disp: , Rfl:   •  DULoxetine (CYMBALTA) 30 MG capsule, Take 60 mg by mouth Daily., Disp: , Rfl:   •  ibuprofen (ADVIL,MOTRIN) 400 MG tablet, Take 400 mg by mouth Every 6 (Six) Hours As Needed for Mild Pain . HOLD PRIOR TO SURGERY, Disp: , Rfl:   •  metoprolol tartrate (LOPRESSOR) 50 MG tablet, TAKE 1 TABLET BY MOUTH EVERY 12 HOURS, Disp: 180 tablet, Rfl: 0  •  terazosin (HYTRIN) 10 MG " capsule, Take 10 mg by mouth Daily., Disp: , Rfl: 3   Assessment:        Patient Active Problem List   Diagnosis   • Abnormal thallium stress test   • Atrial fibrillation (CMS/HCC)   • S/P CABG (coronary artery bypass graft)   • Chest pain   • Hyperlipidemia   • Shortness of breath   • Weak pulse   • SOB (shortness of breath)   • Chronic coronary artery disease   • Degenerative joint disease (DJD) of hip   • Palpitations   • Bilateral pulmonary infiltrates on chest x-ray               Plan:            ICD-10-CM ICD-9-CM   1. Paroxysmal atrial fibrillation (CMS/HCC) I48.0 427.31   2. Abnormal EKG R94.31 794.31   3. Palpitation R00.2 785.1   4. Palpitations R00.2 785.1   5. Chronic coronary artery disease I25.10 414.00   6. S/P CABG (coronary artery bypass graft) Z95.1 V45.81     1. Paroxysmal atrial fibrillation (CMS/HCC)  Atrial fibrillation rate under control  - ECG 12 Lead  - Holter Monitor - 72 Hour Up To 21 Days; Future    2. Abnormal EKG  No chest pain  - Holter Monitor - 72 Hour Up To 21 Days; Future    3. Palpitation  Probably due to atrial fibrillation  - Holter Monitor - 72 Hour Up To 21 Days; Future      5. Chronic coronary artery disease  No angina    6. S/P CABG (coronary artery bypass graft)  Stable       Advised 2 wks zio patch    Advised sotalol    See us 1 month  COUNSELING:    José Chavez was given to patient for the following topics: diagnostic results, risk factor reductions, impressions, risks and benefits of treatment options and importance of treatment compliance .       SMOKING COUNSELING:    Counseling given: Not Answered      EMR Dragon/Transcription disclaimer:   Much of this encounter note is an electronic transcription/translation of spoken language to printed text. The electronic translation of spoken language may permit erroneous, or at times, nonsensical words or phrases to be inadvertently transcribed; Although I have reviewed the note for such errors, some may still  exist.

## 2018-09-17 ENCOUNTER — OFFICE VISIT (OUTPATIENT)
Dept: CARDIOLOGY | Facility: CLINIC | Age: 73
End: 2018-09-17

## 2018-09-17 VITALS
BODY MASS INDEX: 31.82 KG/M2 | HEIGHT: 66 IN | DIASTOLIC BLOOD PRESSURE: 75 MMHG | SYSTOLIC BLOOD PRESSURE: 123 MMHG | HEART RATE: 66 BPM | WEIGHT: 198 LBS

## 2018-09-17 DIAGNOSIS — I48.0 PAROXYSMAL ATRIAL FIBRILLATION (HCC): ICD-10-CM

## 2018-09-17 DIAGNOSIS — E78.5 HYPERLIPIDEMIA, UNSPECIFIED HYPERLIPIDEMIA TYPE: ICD-10-CM

## 2018-09-17 DIAGNOSIS — I25.10 CHRONIC CORONARY ARTERY DISEASE: ICD-10-CM

## 2018-09-17 DIAGNOSIS — R00.2 PALPITATIONS: Primary | ICD-10-CM

## 2018-09-17 DIAGNOSIS — Z95.1 S/P CABG (CORONARY ARTERY BYPASS GRAFT): ICD-10-CM

## 2018-09-17 PROCEDURE — 99214 OFFICE O/P EST MOD 30 MIN: CPT | Performed by: INTERNAL MEDICINE

## 2018-09-17 RX ORDER — METOPROLOL SUCCINATE 50 MG/1
75 TABLET, EXTENDED RELEASE ORAL DAILY
Qty: 135 TABLET | Refills: 3 | Status: SHIPPED | OUTPATIENT
Start: 2018-09-17 | End: 2019-08-07

## 2018-09-17 RX ORDER — DULOXETIN HYDROCHLORIDE 60 MG/1
CAPSULE, DELAYED RELEASE ORAL
Refills: 1 | COMMUNITY
Start: 2018-08-24 | End: 2019-03-18

## 2018-09-17 NOTE — PROGRESS NOTES
Subjective:       José Moody is a 73 y.o. male who here for follow up    CC  Atrial fibrillation seen on pacemaker  HPI  73-year-old male with known history of atrial Coblation, hyperlipidemia and coronary artery disease and palpitations with a status post coronary artery bypass graft surgery was found to have the atrial fibrillation 2.9% on the pacemaker with occasional palpitation and no chest pains or tightness in chest     Problem List Items Addressed This Visit        Cardiovascular and Mediastinum    Atrial fibrillation (CMS/HCC)    Hyperlipidemia    Chronic coronary artery disease    Palpitations - Primary       Other    S/P CABG (coronary artery bypass graft)        .    The following portions of the patient's history were reviewed and updated as appropriate: allergies, current medications, past family history, past medical history, past social history, past surgical history and problem list.    Past Medical History:   Diagnosis Date   • Anxiety    • Arthritis     LT HIP   • Atrial fibrillation (CMS/HCC)    • Coronary artery disease    • High cholesterol    • History of atrial fibrillation     LAST HAD 1-2016   • Limited joint range of motion     LT HIP   • PONV (postoperative nausea and vomiting)     AFTER TONSILLECTOMY    reports that he quit smoking about 17 years ago. His smoking use included Cigarettes. He has a 13.50 pack-year smoking history. He has never used smokeless tobacco. He reports that he does not drink alcohol or use drugs.  Family History   Problem Relation Age of Onset   • Heart failure Brother    • Heart disease Brother    • Heart attack Brother    • Malig Hyperthermia Neg Hx    • Hypertension Neg Hx    • Hyperlipidemia Neg Hx        Review of Systems  Constitutional: No wt loss, fever, fatigue  Gastrointestinal: No nausea, abdominal pain  Behavioral/Psych: No insomnia or anxiety   Cardiovascular palpitation  Objective:                 Physical Exam  /75   Pulse 66   Ht  "167.6 cm (66\")   Wt 89.8 kg (198 lb)   BMI 31.96 kg/m²     General appearance: NAD, conversant   Eyes: anicteric sclerae, moist conjunctivae; no lid-lag; PERRLA   HENT: Atraumatic; oropharynx clear with moist mucous membranes and no mucosal ulcerations;  normal hard and soft palate   Neck: Trachea midline; FROM, supple, no thyromegaly or lymphadenopathy   Lungs: CTA, with normal respiratory effort and no intercostal retractions   CV: S1-S2 regular, no murmurs, no rub, no gallop   Abdomen: Soft, non-tender; no masses or HSM   Extremities: No peripheral edema or extremity lymphadenopathy  Skin: Normal temperature, turgor and texture; no rash, ulcers or subcutaneous nodules   Psych: Appropriate affect, alert and oriented to person, place and time           Cardiographics  @Procedures    Echocardiogram:        Current Outpatient Prescriptions:   •  atorvastatin (LIPITOR) 20 MG tablet, Take 20 mg by mouth Every Evening., Disp: , Rfl:   •  DULoxetine (CYMBALTA) 60 MG capsule, TK 1 C PO D, Disp: , Rfl: 1  •  ibuprofen (ADVIL,MOTRIN) 400 MG tablet, Take 400 mg by mouth Every 6 (Six) Hours As Needed for Mild Pain . HOLD PRIOR TO SURGERY, Disp: , Rfl:   •  metoprolol tartrate (LOPRESSOR) 50 MG tablet, TAKE 1 TABLET BY MOUTH EVERY 12 HOURS, Disp: 180 tablet, Rfl: 0  •  terazosin (HYTRIN) 10 MG capsule, Take 10 mg by mouth Daily., Disp: , Rfl: 3   Assessment:        Patient Active Problem List   Diagnosis   • Abnormal thallium stress test   • Atrial fibrillation (CMS/HCC)   • S/P CABG (coronary artery bypass graft)   • Chest pain   • Hyperlipidemia   • Shortness of breath   • Weak pulse   • SOB (shortness of breath)   • Chronic coronary artery disease   • Degenerative joint disease (DJD) of hip   • Palpitations   • Bilateral pulmonary infiltrates on chest x-ray     Interpretation Summary     · An abnormal monitor study.  · He was in AFIB 2.9% of recording time  · UPRAVENTRUCULAR TACHYCARDIA occurred 38 times.               " Plan:            ICD-10-CM ICD-9-CM   1. Palpitations R00.2 785.1   2. Hyperlipidemia, unspecified hyperlipidemia type E78.5 272.4   3. Chronic coronary artery disease I25.10 414.00   4. Paroxysmal atrial fibrillation (CMS/HCC) I48.0 427.31   5. S/P CABG (coronary artery bypass graft) Z95.1 V45.81     1. Palpitations  Probably due to the atrial fibrillation  - Holter Monitor - 72 Hour Up To 21 Days; Future    2. Hyperlipidemia, unspecified hyperlipidemia type  Counseling done    3. Chronic coronary artery disease  No angina pectoralis    4. Paroxysmal atrial fibrillation (CMS/HCC)  Increasing burden will add beta blockers  - Holter Monitor - 72 Hour Up To 21 Days; Future    5. S/P CABG (coronary artery bypass graft)  No angina       2.9 % afib    Increase toprol to 75 mg po daily    Pros and cons of this new medication / change medication has been explained to  the patient    Possible side effects has been explained    Associated need of the blood  Work has been explained    Need for the compliance of the medication has been explained      See in 6 month  With 2 wk zio patch  COUNSELING:    José Chavez was given to patient for the following topics: diagnostic results, risk factor reductions, impressions, risks and benefits of treatment options and importance of treatment compliance .       SMOKING COUNSELING:    Counseling given: Not Answered      EMR Dragon/Transcription disclaimer:   Much of this encounter note is an electronic transcription/translation of spoken language to printed text. The electronic translation of spoken language may permit erroneous, or at times, nonsensical words or phrases to be inadvertently transcribed; Although I have reviewed the note for such errors, some may still exist.

## 2018-09-28 ENCOUNTER — TELEPHONE (OUTPATIENT)
Dept: CARDIOLOGY | Facility: CLINIC | Age: 73
End: 2018-09-28

## 2018-10-09 RX ORDER — METOPROLOL TARTRATE 50 MG/1
TABLET, FILM COATED ORAL
Qty: 180 TABLET | Refills: 0 | Status: SHIPPED | OUTPATIENT
Start: 2018-10-09 | End: 2019-03-18

## 2019-03-18 ENCOUNTER — OFFICE VISIT (OUTPATIENT)
Dept: CARDIOLOGY | Facility: CLINIC | Age: 74
End: 2019-03-18

## 2019-03-18 VITALS
SYSTOLIC BLOOD PRESSURE: 138 MMHG | HEIGHT: 66 IN | BODY MASS INDEX: 33.11 KG/M2 | HEART RATE: 62 BPM | DIASTOLIC BLOOD PRESSURE: 78 MMHG | WEIGHT: 206 LBS

## 2019-03-18 DIAGNOSIS — E78.5 HYPERLIPIDEMIA, UNSPECIFIED HYPERLIPIDEMIA TYPE: ICD-10-CM

## 2019-03-18 DIAGNOSIS — R00.2 PALPITATIONS: Primary | ICD-10-CM

## 2019-03-18 DIAGNOSIS — I25.10 CHRONIC CORONARY ARTERY DISEASE: ICD-10-CM

## 2019-03-18 DIAGNOSIS — I48.0 PAROXYSMAL ATRIAL FIBRILLATION (HCC): ICD-10-CM

## 2019-03-18 PROCEDURE — 99213 OFFICE O/P EST LOW 20 MIN: CPT | Performed by: INTERNAL MEDICINE

## 2019-03-18 RX ORDER — AMIODARONE HYDROCHLORIDE 200 MG/1
400 TABLET ORAL
COMMUNITY
Start: 2014-08-12 | End: 2019-03-18

## 2019-03-18 RX ORDER — WARFARIN SODIUM 2.5 MG/1
2.5 TABLET ORAL DAILY
COMMUNITY
Start: 2014-08-12 | End: 2019-03-18

## 2019-03-18 RX ORDER — ASPIRIN 81 MG/1
81 TABLET ORAL DAILY
COMMUNITY
Start: 2014-08-12 | End: 2019-03-18

## 2019-03-18 RX ORDER — POTASSIUM CHLORIDE 20 MEQ/1
20 TABLET, EXTENDED RELEASE ORAL DAILY
COMMUNITY
Start: 2014-08-12 | End: 2019-03-18

## 2019-03-18 RX ORDER — FUROSEMIDE 40 MG/1
40 TABLET ORAL DAILY
COMMUNITY
Start: 2014-08-12 | End: 2019-03-18

## 2019-03-18 RX ORDER — BUSPIRONE HYDROCHLORIDE 15 MG/1
15 TABLET ORAL 2 TIMES DAILY
Refills: 5 | COMMUNITY
Start: 2019-03-08 | End: 2019-07-22 | Stop reason: ALTCHOICE

## 2019-03-18 NOTE — PROGRESS NOTES
Subjective:        José Moody is a 73 y.o. male who here for follow up    CC  The follow-up for the coronary artery disease hyperlipidemia palpitations  HPI  73-year-old male with known history of the coronary artery disease, hyperlipidemia atrial fibrillation as well as palpitation with a history of the CABG here for the follow-up with no complaints of chest pains or tightness in chest no heaviness of the pressure sensation    Patient had a Holter monitor done which showed atrial fibrillation of the duration less than 2 hours     Problem List Items Addressed This Visit        Cardiovascular and Mediastinum    Atrial fibrillation (CMS/HCC)    Hyperlipidemia    Chronic coronary artery disease    Palpitations - Primary        .Interpretation Summary     · An abnormal monitor study.  · NSR WITH FREQUENT EPISODES FOR AFIB, ALL LESS THAN 2 HRS         The following portions of the patient's history were reviewed and updated as appropriate: allergies, current medications, past family history, past medical history, past social history, past surgical history and problem list.    Past Medical History:   Diagnosis Date   • Anxiety    • Arthritis     LT HIP   • Atrial fibrillation (CMS/HCC)    • Coronary artery disease    • High cholesterol    • History of atrial fibrillation     LAST HAD 1-2016   • Limited joint range of motion     LT HIP   • PONV (postoperative nausea and vomiting)     AFTER TONSILLECTOMY     reports that he quit smoking about 18 years ago. His smoking use included cigarettes. He has a 13.50 pack-year smoking history. he has never used smokeless tobacco. He reports that he does not drink alcohol or use drugs.   Family History   Problem Relation Age of Onset   • Heart failure Brother    • Heart disease Brother    • Heart attack Brother    • Malig Hyperthermia Neg Hx    • Hypertension Neg Hx    • Hyperlipidemia Neg Hx        Review of Systems  Constitutional: No wt loss, fever,  "fatigue  Gastrointestinal: No nausea, abdominal pain  Behavioral/Psych: No insomnia or anxiety   Cardiovascular palpitation  Objective:       Physical Exam  /78 (BP Location: Left arm, Patient Position: Sitting)   Pulse 62   Ht 167.6 cm (66\")   Wt 93.4 kg (206 lb)   BMI 33.25 kg/m²   General appearance: No acute changes   Neck: Trachea midline; NECK, supple, no thyromegaly or lymphadenopathy   Lungs: Normal size and shape, normal breath sounds, equal distribution of air, no rales and rhonchi   CV: S1-S2 regular, no murmurs, no rub, no gallop   Abdomen: Soft, non-tender; no masses , no abnormal abdominal sounds   Extremities: No deformity , normal color , no peripheral edema   Skin: Normal temperature, turgor and texture; no rash, ulcers          Procedures      Echocardiogram:        Current Outpatient Medications:   •  atorvastatin (LIPITOR) 20 MG tablet, Take 20 mg by mouth Every Evening., Disp: , Rfl:   •  busPIRone (BUSPAR) 15 MG tablet, Take 15 mg by mouth 2 (Two) Times a Day., Disp: , Rfl: 5  •  ibuprofen (ADVIL,MOTRIN) 400 MG tablet, Take 400 mg by mouth Every 6 (Six) Hours As Needed for Mild Pain . HOLD PRIOR TO SURGERY, Disp: , Rfl:   •  metoprolol succinate XL (TOPROL-XL) 50 MG 24 hr tablet, Take 1.5 tablets by mouth Daily., Disp: 135 tablet, Rfl: 3  •  terazosin (HYTRIN) 10 MG capsule, Take 10 mg by mouth Daily., Disp: , Rfl: 3   Assessment:        Patient Active Problem List   Diagnosis   • Abnormal thallium stress test   • Atrial fibrillation (CMS/HCC)   • S/P CABG (coronary artery bypass graft)   • Chest pain   • Hyperlipidemia   • Shortness of breath   • Weak pulse   • SOB (shortness of breath)   • Chronic coronary artery disease   • Degenerative joint disease (DJD) of hip   • Palpitations   • Bilateral pulmonary infiltrates on chest x-ray               Plan:            ICD-10-CM ICD-9-CM   1. Palpitations R00.2 785.1   2. Hyperlipidemia, unspecified hyperlipidemia type E78.5 272.4   3. " Chronic coronary artery disease I25.10 414.00   4. Paroxysmal atrial fibrillation (CMS/HCC) I48.0 427.31     1. Palpitations  Palpitations controlled    2. Hyperlipidemia, unspecified hyperlipidemia type  Counseling has been done    3. Chronic coronary artery disease  No chest pain    4. Paroxysmal atrial fibrillation (CMS/HCC)  Controlled       CV STABLE    SEE IN 6 MONTHS    PT REFUSES REPEAT HOLTER IN 6 MONTHS  COUNSELING:    José Chavez was given to patient for the following topics: diagnostic results, risk factor reductions, impressions, risks and benefits of treatment options and importance of treatment compliance .       SMOKING COUNSELING:    Counseling given: Yes      Dictated using Dragon dictation

## 2019-07-22 ENCOUNTER — OFFICE VISIT (OUTPATIENT)
Dept: CARDIOLOGY | Facility: CLINIC | Age: 74
End: 2019-07-22

## 2019-07-22 VITALS
HEIGHT: 66 IN | DIASTOLIC BLOOD PRESSURE: 80 MMHG | SYSTOLIC BLOOD PRESSURE: 128 MMHG | BODY MASS INDEX: 32.3 KG/M2 | HEART RATE: 62 BPM | WEIGHT: 201 LBS

## 2019-07-22 DIAGNOSIS — I25.10 CHRONIC CORONARY ARTERY DISEASE: ICD-10-CM

## 2019-07-22 DIAGNOSIS — I48.0 PAROXYSMAL ATRIAL FIBRILLATION (HCC): Primary | ICD-10-CM

## 2019-07-22 DIAGNOSIS — Z95.1 S/P CABG (CORONARY ARTERY BYPASS GRAFT): ICD-10-CM

## 2019-07-22 DIAGNOSIS — R00.2 PALPITATIONS: ICD-10-CM

## 2019-07-22 PROCEDURE — 99214 OFFICE O/P EST MOD 30 MIN: CPT | Performed by: INTERNAL MEDICINE

## 2019-07-22 PROCEDURE — 93000 ELECTROCARDIOGRAM COMPLETE: CPT | Performed by: INTERNAL MEDICINE

## 2019-07-22 RX ORDER — DULOXETIN HYDROCHLORIDE 60 MG/1
1 CAPSULE, DELAYED RELEASE ORAL DAILY
Refills: 4 | COMMUNITY
Start: 2019-06-20

## 2019-07-22 NOTE — PROGRESS NOTES
José RESENDIZ Fransisco  1945  Date of Office Visit: 07/22/2019  Encounter Provider: Allen Watkins MD  Place of Service: [facility] CARDIOLOGY      CHIEF COMPLAINT:  Coronary disease status post CABG  Paroxysmal atrial fibrillation      HISTORY OF PRESENT ILLNESS:  A very pleasant 74-year-old male with a medical history of paroxysmal atrial fibrillation, coronary disease with prior CABG, palpitations, hyperlipidemia,who presents to me as a transfer of care. He has previously been noted to have paroxysmal atrial fibrillation with episodes typically lasting less than 1-2 hours. He has refused anticoagulation in the past and his NELE2UK-FOTn score has actually been just 1 in the past due to his age greater than 65. He does not have any evidence of hypertension at this time and has not had a prior thromboembolic event.    His main complaint at this point in time is that he has fatigue and does not feel like his exercise tolerance is what it used to be. He has had increased doses of Toprol and is now taking 75 mg in the morning and 50 mg in the evening. He does not report frequent palpitations or tachycardia.       Review of Systems   Constitution: Positive for malaise/fatigue. Negative for fever and weakness.   HENT: Negative for nosebleeds and sore throat.    Eyes: Negative for blurred vision and double vision.   Cardiovascular: Negative for chest pain, claudication, palpitations and syncope.   Respiratory: Negative for cough, shortness of breath and snoring.    Endocrine: Negative for cold intolerance, heat intolerance and polydipsia.   Skin: Negative for itching, poor wound healing and rash.   Musculoskeletal: Negative for joint pain, joint swelling, muscle weakness and myalgias.   Gastrointestinal: Negative for abdominal pain, melena, nausea and vomiting.   Neurological: Negative for light-headedness, loss of balance, seizures and vertigo.   Psychiatric/Behavioral: Negative for  "altered mental status and depression.       Past Medical History:   Diagnosis Date   • Anxiety    • Arthritis     LT HIP   • Atrial fibrillation (CMS/HCC)    • Coronary artery disease    • High cholesterol    • History of atrial fibrillation     LAST HAD 1-2016   • Limited joint range of motion     LT HIP   • PONV (postoperative nausea and vomiting)     AFTER TONSILLECTOMY       The following portions of the patient's history were reviewed and updated as appropriate: Social history , Family history and Surgical history     Current Outpatient Medications on File Prior to Visit   Medication Sig Dispense Refill   • atorvastatin (LIPITOR) 20 MG tablet Take 20 mg by mouth Every Evening.     • DULoxetine (CYMBALTA) 60 MG capsule Take 1 capsule by mouth Daily.  4   • ibuprofen (ADVIL,MOTRIN) 400 MG tablet Take 400 mg by mouth Every 6 (Six) Hours As Needed for Mild Pain . HOLD PRIOR TO SURGERY     • metoprolol succinate XL (TOPROL-XL) 50 MG 24 hr tablet Take 1.5 tablets by mouth Daily. (Patient taking differently: Take  by mouth Daily. 75 mg in am and 50 mg in pm) 135 tablet 3   • terazosin (HYTRIN) 10 MG capsule Take 10 mg by mouth Daily.  3   • [DISCONTINUED] busPIRone (BUSPAR) 15 MG tablet Take 15 mg by mouth 2 (Two) Times a Day.  5     No current facility-administered medications on file prior to visit.        No Known Allergies    Vitals:    07/22/19 1043   BP: 128/80   Pulse: 62   Weight: 91.2 kg (201 lb)   Height: 167.6 cm (66\")     Physical Exam   Constitutional: He is oriented to person, place, and time. He appears well-developed and well-nourished.   HENT:   Head: Normocephalic and atraumatic.   Eyes: Conjunctivae and EOM are normal. No scleral icterus.   Neck: Normal range of motion. Neck supple. Normal carotid pulses, no hepatojugular reflux and no JVD present. Carotid bruit is not present. No tracheal deviation present. No thyromegaly present.   Cardiovascular: Normal rate and regular rhythm. Exam reveals no " gallop and no friction rub.   No murmur heard.  Pulses:       Carotid pulses are 2+ on the right side, and 2+ on the left side.       Radial pulses are 2+ on the right side, and 2+ on the left side.        Femoral pulses are 2+ on the right side, and 2+ on the left side.       Dorsalis pedis pulses are 2+ on the right side, and 2+ on the left side.        Posterior tibial pulses are 2+ on the right side, and 2+ on the left side.   Pulmonary/Chest: Breath sounds normal. No respiratory distress. He has no decreased breath sounds. He has no wheezes. He has no rhonchi. He has no rales. He exhibits no tenderness.   Abdominal: Soft. Bowel sounds are normal. He exhibits no distension. There is no tenderness. There is no rebound.   Musculoskeletal: He exhibits no edema or deformity.   Neurological: He is alert and oriented to person, place, and time. He has normal strength. No sensory deficit.   Skin: No rash noted. No erythema.   Psychiatric: He has a normal mood and affect. His behavior is normal.       No results found for: CBC  No results found for: CMP  No components found for: LIPID  No results found for: BMP      ECG 12 Lead  Date/Time: 7/22/2019 1:44 PM  Performed by: Allen Watkins MD  Authorized by: Allen Watkins MD   Comparison: compared with previous ECG   Rhythm: sinus rhythm  Rate: normal  Conduction: non-specific intraventricular conduction delay  QRS axis: left    Clinical impression: non-specific ECG             7/9/17  · Left ventricular systolic function is normal.  · Left ventricular diastolic dysfunction (grade I) consistent with impaired relaxation.  · Mild pulmonic valve regurgitation is present.  · calcification of the aortic valve    DISCUSSION/SUMMARYVery pleasant 74-year-old male with a medical history of coronary disease and prior CABG, paroxysmal atrial fibrillation, and hyperlipidemia, who presents to me as a transfer of care. He has episodes of atrial fibrillation which are  very short in duration. He has not been on anticoagulant therapy in the past and previously has refused it. His [id number] score is actually just 1 at this point in time.     His main complaint is currently fatigue. He is on Toprol-XL that he takes b.i.d. and this is certainly going to contribute to those symptoms. He has previously been on amiodarone, however, had an abnormal chest CT along with dyspnea and there was concern about amiodarone effect. As such, he has not been on that medicine since that point in time.     1. Paroxysmal atrial fibrillation.   -CAG1XC2-DWQs score is 1 at this time. I will not recommend anticoagulant therapy, however, that will likely change in the next couple of years. The patient is aware.   -I am going to have him drop his evening dose of Toprol-XL and leave him on 75 mg in the morning. We will see how he does with fatigue and also how his atrial fibrillation control is affected by this change.   -I would not recommend rhythm control. If we have additional issues with atrial fibrillation, at that point in time we could consider sotalol therapy.     2. Coronary artery disease with prior CABG: No angina.   -Recommended aspirin 81 mg p.o. daily.   -Continue atorvastatin at current dose  -Continue Toprol-XL but at the decreased dose discussed above.     I will see him back in 3 months or earlier if problems.     Atrial Fibrillation and Atrial Flutter  Assessment  • The patient has paroxysmal atrial fibrillation  • This is non-valvular in etiology  • The patient's CHADS2-VASc score is 1  • A [id number] score of 1 is considered an intermediate risk for a thromboembolic event    Plan  • Attempt to maintain sinus rhythm  • Continue beta blocker for rate control

## 2019-08-06 ENCOUNTER — TELEPHONE (OUTPATIENT)
Dept: CARDIOLOGY | Facility: CLINIC | Age: 74
End: 2019-08-06

## 2019-08-06 NOTE — TELEPHONE ENCOUNTER
Pt called. He was following up with you about his recent reduction in his Metoprolol Succ from 75mg am and 50mg pm to 75mg am.    He said that his fatigue hasn't really changed. He said he has noticed a little increase in his Afib.  His BP have been running 120-130's/80's HR 60-80's.    Please advise of any changes.    Thanks,  Felisha        On 7/22/19:    1. Paroxysmal atrial fibrillation.   -[id number] score is 1 at this time. I will not recommend anticoagulant therapy, however, that will likely change in the next couple of years. The patient is aware.   -I am going to have him drop his evening dose of Toprol-XL and leave him on 75 mg in the morning. We will see how he does with fatigue and also how his atrial fibrillation control is affected by this change.   -I would not recommend rhythm control. If we have additional issues with atrial fibrillation, at that point in time we could consider sotalol therapy.

## 2019-08-06 NOTE — TELEPHONE ENCOUNTER
Sounds like fatigue is unrelated to toprol  I would move back up on Toprol   Tell him to touch base with pcp on fatigue

## 2019-08-07 RX ORDER — METOPROLOL SUCCINATE 50 MG/1
TABLET, EXTENDED RELEASE ORAL
Qty: 225 TABLET | Refills: 1 | COMMUNITY
Start: 2019-08-07 | End: 2020-06-29 | Stop reason: ALTCHOICE

## 2019-09-16 ENCOUNTER — OFFICE VISIT (OUTPATIENT)
Dept: CARDIOLOGY | Facility: CLINIC | Age: 74
End: 2019-09-16

## 2019-09-16 VITALS
BODY MASS INDEX: 32.47 KG/M2 | SYSTOLIC BLOOD PRESSURE: 103 MMHG | WEIGHT: 202 LBS | DIASTOLIC BLOOD PRESSURE: 69 MMHG | HEART RATE: 73 BPM | HEIGHT: 66 IN

## 2019-09-16 DIAGNOSIS — E78.5 HYPERLIPIDEMIA, UNSPECIFIED HYPERLIPIDEMIA TYPE: ICD-10-CM

## 2019-09-16 DIAGNOSIS — I48.0 PAROXYSMAL ATRIAL FIBRILLATION (HCC): ICD-10-CM

## 2019-09-16 DIAGNOSIS — R06.02 SOB (SHORTNESS OF BREATH): ICD-10-CM

## 2019-09-16 DIAGNOSIS — I25.10 CHRONIC CORONARY ARTERY DISEASE: ICD-10-CM

## 2019-09-16 DIAGNOSIS — R00.2 PALPITATIONS: Primary | ICD-10-CM

## 2019-09-16 PROCEDURE — 99213 OFFICE O/P EST LOW 20 MIN: CPT | Performed by: INTERNAL MEDICINE

## 2019-09-16 PROCEDURE — 93000 ELECTROCARDIOGRAM COMPLETE: CPT | Performed by: INTERNAL MEDICINE

## 2019-12-11 ENCOUNTER — TELEPHONE (OUTPATIENT)
Dept: CARDIOLOGY | Age: 74
End: 2019-12-11

## 2019-12-11 NOTE — TELEPHONE ENCOUNTER
----- Message from Krista RESENDIZ Galvan sent at 12/11/2019 10:06 AM EST -----  Regarding: CARDIAC CLEARANCE FOR HIP ON 1/13  Contact: [phone]  FAX TO DR SANTANA [phone]        Will discuss with Dr SCHULTZ    PT WILL NEED TESTING     CALLED L/M TO R/C

## 2019-12-19 ENCOUNTER — OFFICE VISIT (OUTPATIENT)
Dept: CARDIOLOGY | Facility: CLINIC | Age: 74
End: 2019-12-19

## 2019-12-19 VITALS
HEIGHT: 66 IN | DIASTOLIC BLOOD PRESSURE: 78 MMHG | HEART RATE: 66 BPM | WEIGHT: 202 LBS | BODY MASS INDEX: 32.47 KG/M2 | SYSTOLIC BLOOD PRESSURE: 129 MMHG

## 2019-12-19 DIAGNOSIS — R00.2 PALPITATIONS: ICD-10-CM

## 2019-12-19 DIAGNOSIS — E78.5 HYPERLIPIDEMIA, UNSPECIFIED HYPERLIPIDEMIA TYPE: ICD-10-CM

## 2019-12-19 DIAGNOSIS — Z95.1 S/P CABG (CORONARY ARTERY BYPASS GRAFT): ICD-10-CM

## 2019-12-19 DIAGNOSIS — I25.10 CHRONIC CORONARY ARTERY DISEASE: ICD-10-CM

## 2019-12-19 DIAGNOSIS — R94.31 ABNORMAL EKG: Primary | ICD-10-CM

## 2019-12-19 PROCEDURE — 93000 ELECTROCARDIOGRAM COMPLETE: CPT | Performed by: INTERNAL MEDICINE

## 2019-12-19 PROCEDURE — 99213 OFFICE O/P EST LOW 20 MIN: CPT | Performed by: INTERNAL MEDICINE

## 2019-12-19 RX ORDER — BUSPIRONE HYDROCHLORIDE 15 MG/1
15 TABLET ORAL 2 TIMES DAILY
COMMUNITY

## 2019-12-19 RX ORDER — ASPIRIN 81 MG/1
81 TABLET ORAL DAILY
COMMUNITY

## 2019-12-19 RX ORDER — MELOXICAM 15 MG/1
TABLET ORAL DAILY
COMMUNITY
Start: 2019-12-06 | End: 2019-12-19

## 2019-12-19 NOTE — PROGRESS NOTES
Subjective:        José Moody is a 74 y.o. male who here for follow up    CC  HIP SURGERY CLEARANCE  HPI  74-year-old male with known history of the coronary artery disease, status post CABG hyperlipidemia here for the clearance for the hip surgery denies any chest pains or tightness in the chest     Problem List Items Addressed This Visit        Cardiovascular and Mediastinum    Hyperlipidemia    Chronic coronary artery disease    Relevant Orders    Stress Test With Myocardial Perfusion One Day (Completed)    Adult Transthoracic Echo Complete W/ Cont if Necessary Per Protocol (Completed)    Palpitations       Other    S/P CABG (coronary artery bypass graft)      Other Visit Diagnoses     Abnormal EKG    -  Primary    Relevant Orders    Stress Test With Myocardial Perfusion One Day (Completed)    Adult Transthoracic Echo Complete W/ Cont if Necessary Per Protocol (Completed)        .    The following portions of the patient's history were reviewed and updated as appropriate: allergies, current medications, past family history, past medical history, past social history, past surgical history and problem list.    Past Medical History:   Diagnosis Date   • Anxiety    • Arthritis     LT HIP   • Atrial fibrillation (CMS/HCC)    • Coronary artery disease    • High cholesterol    • History of atrial fibrillation     LAST HAD 1-2016   • Limited joint range of motion     LT HIP   • PONV (postoperative nausea and vomiting)     AFTER TONSILLECTOMY     reports that he quit smoking about 18 years ago. His smoking use included cigarettes. He has a 13.50 pack-year smoking history. He has never used smokeless tobacco. He reports that he does not drink alcohol or use drugs.   Family History   Problem Relation Age of Onset   • Heart failure Brother    • Heart disease Brother    • Heart attack Brother    • Malig Hyperthermia Neg Hx    • Hypertension Neg Hx    • Hyperlipidemia Neg Hx        Review of Systems  Constitutional: No  "wt loss, fever, fatigue  Gastrointestinal: No nausea, abdominal pain  Behavioral/Psych: No insomnia or anxiety   Cardiovascular no chest pains or tightness in the chest  Objective:       Physical Exam  /78 (BP Location: Left arm, Patient Position: Sitting)   Pulse 66   Ht 167.6 cm (66\")   Wt 91.6 kg (202 lb)   BMI 32.60 kg/m²   General appearance: No acute changes   Neck: Trachea midline; NECK, supple, no thyromegaly or lymphadenopathy   Lungs: Normal size and shape, normal breath sounds, equal distribution of air, no rales and rhonchi   CV: S1-S2 regular, no murmurs, no rub, no gallop   Abdomen: Soft, non-tender; no masses , no abnormal abdominal sounds   Extremities: No deformity , normal color , no peripheral edema   Skin: Normal temperature, turgor and texture; no rash, ulcers            ECG 12 Lead  Date/Time: 12/19/2019 1:57 PM  Performed by: Tanisha Mosqueda MD  Authorized by: Tanisha Mosqueda MD   Comparison: compared with previous ECG   Similar to previous ECG  Rhythm: sinus rhythm  ST Flattening: all    Clinical impression: non-specific ECG              Echocardiogram:        Current Outpatient Medications:   •  aspirin 81 MG EC tablet, Take 81 mg by mouth Daily., Disp: , Rfl:   •  atorvastatin (LIPITOR) 20 MG tablet, Take 20 mg by mouth Every Evening., Disp: , Rfl:   •  busPIRone (BUSPAR) 15 MG tablet, Take 15 mg by mouth 3 (Three) Times a Day., Disp: , Rfl:   •  DULoxetine (CYMBALTA) 60 MG capsule, Take 1 capsule by mouth Daily., Disp: , Rfl: 4  •  ibuprofen (ADVIL,MOTRIN) 400 MG tablet, Take 400 mg by mouth Every 6 (Six) Hours As Needed for Mild Pain . HOLD PRIOR TO SURGERY, Disp: , Rfl:   •  metoprolol succinate XL (TOPROL-XL) 50 MG 24 hr tablet, 75 mg in am and 50 mg in pm, Disp: 225 tablet, Rfl: 1  •  terazosin (HYTRIN) 10 MG capsule, Take 10 mg by mouth Daily., Disp: , Rfl: 3   Assessment:        Patient Active Problem List   Diagnosis   • Abnormal thallium stress test   • " Atrial fibrillation (CMS/HCC)   • S/P CABG (coronary artery bypass graft)   • Chest pain   • Hyperlipidemia   • Shortness of breath   • Weak pulse   • SOB (shortness of breath)   • Chronic coronary artery disease   • Degenerative joint disease (DJD) of hip   • Palpitations   • Bilateral pulmonary infiltrates on chest x-ray               Plan:            ICD-10-CM ICD-9-CM   1. Abnormal EKG R94.31 794.31   2. Chronic coronary artery disease I25.10 414.00   3. Hyperlipidemia, unspecified hyperlipidemia type E78.5 272.4   4. Palpitations R00.2 785.1   5. S/P CABG (coronary artery bypass graft) Z95.1 V45.81     1. Abnormal EKG  Considering the patient's symptoms as well as clinical situation and  EKG findings, along with cardiac risk factors, ischemic workup is necessary to rule out ischemic cardiomyopathy, stress induced arrhythmias, and functional capacity for diagnosis as well as prognostic consideration    - Stress Test With Myocardial Perfusion One Day  - Adult Transthoracic Echo Complete W/ Cont if Necessary Per Protocol    2. Chronic coronary artery disease  Considering patient's medical condition as well as the risk factors, patient will require echocardiogram for further evaluation for the LV function, four-chamber evaluation, including the pressures, valvular function and  pericardial disease and pericardial effusion    - Stress Test With Myocardial Perfusion One Day  - Adult Transthoracic Echo Complete W/ Cont if Necessary Per Protocol    3. Hyperlipidemia, unspecified hyperlipidemia type  Continue current medication    4. Palpitations  Under control    5. S/P CABG (coronary artery bypass graft)  No angina pectoris       LEXISCAN, ECHO FOR SURG CLEARANCE  COUNSELING:    José Chavez was given to patient for the following topics: diagnostic results, risk factor reductions, impressions, risks and benefits of treatment options and importance of treatment compliance .       SMOKING  COUNSELING:    [unfilled]    Dictated using Dragon dictation

## 2019-12-24 ENCOUNTER — HOSPITAL ENCOUNTER (OUTPATIENT)
Dept: CARDIOLOGY | Facility: HOSPITAL | Age: 74
Discharge: HOME | End: 2019-12-24
Admitting: INTERNAL MEDICINE

## 2019-12-24 VITALS
BODY MASS INDEX: 32.47 KG/M2 | DIASTOLIC BLOOD PRESSURE: 84 MMHG | HEART RATE: 82 BPM | SYSTOLIC BLOOD PRESSURE: 132 MMHG | HEIGHT: 66 IN | WEIGHT: 202 LBS

## 2019-12-24 PROCEDURE — 93306 TTE W/DOPPLER COMPLETE: CPT | Performed by: INTERNAL MEDICINE

## 2019-12-24 PROCEDURE — 93306 TTE W/DOPPLER COMPLETE: CPT

## 2019-12-25 LAB
BH CV ECHO MEAS - ACS: 1.7 CM
BH CV ECHO MEAS - AO MAX PG (FULL): 1.5 MMHG
BH CV ECHO MEAS - AO MAX PG: 4.7 MMHG
BH CV ECHO MEAS - AO MEAN PG (FULL): 1 MMHG
BH CV ECHO MEAS - AO MEAN PG: 3 MMHG
BH CV ECHO MEAS - AO ROOT AREA (BSA CORRECTED): 1.5
BH CV ECHO MEAS - AO ROOT AREA: 7.1 CM^2
BH CV ECHO MEAS - AO ROOT DIAM: 3 CM
BH CV ECHO MEAS - AO V2 MAX: 108 CM/SEC
BH CV ECHO MEAS - AO V2 MEAN: 80.7 CM/SEC
BH CV ECHO MEAS - AO V2 VTI: 26.1 CM
BH CV ECHO MEAS - AVA(I,A): 2.6 CM^2
BH CV ECHO MEAS - AVA(I,D): 2.6 CM^2
BH CV ECHO MEAS - AVA(V,A): 2.8 CM^2
BH CV ECHO MEAS - AVA(V,D): 2.8 CM^2
BH CV ECHO MEAS - BSA(HAYCOCK): 2.1 M^2
BH CV ECHO MEAS - BSA: 2 M^2
BH CV ECHO MEAS - BZI_BMI: 32.6 KILOGRAMS/M^2
BH CV ECHO MEAS - BZI_METRIC_HEIGHT: 167.6 CM
BH CV ECHO MEAS - BZI_METRIC_WEIGHT: 91.6 KG
BH CV ECHO MEAS - EDV(CUBED): 103.8 ML
BH CV ECHO MEAS - EDV(MOD-SP2): 86 ML
BH CV ECHO MEAS - EDV(MOD-SP4): 95 ML
BH CV ECHO MEAS - EDV(TEICH): 102.4 ML
BH CV ECHO MEAS - EF(CUBED): 62.1 %
BH CV ECHO MEAS - EF(MOD-BP): 60 %
BH CV ECHO MEAS - EF(MOD-SP2): 59.3 %
BH CV ECHO MEAS - EF(MOD-SP4): 64.2 %
BH CV ECHO MEAS - EF(TEICH): 53.7 %
BH CV ECHO MEAS - ESV(CUBED): 39.3 ML
BH CV ECHO MEAS - ESV(MOD-SP2): 35 ML
BH CV ECHO MEAS - ESV(MOD-SP4): 34 ML
BH CV ECHO MEAS - ESV(TEICH): 47.4 ML
BH CV ECHO MEAS - FS: 27.7 %
BH CV ECHO MEAS - IVS/LVPW: 1
BH CV ECHO MEAS - IVSD: 1.2 CM
BH CV ECHO MEAS - LA DIMENSION: 3.7 CM
BH CV ECHO MEAS - LA/AO: 1.2
BH CV ECHO MEAS - LAT PEAK E' VEL: 9.6 CM/SEC
BH CV ECHO MEAS - LV DIASTOLIC VOL/BSA (35-75): 47.3 ML/M^2
BH CV ECHO MEAS - LV MASS(C)D: 212 GRAMS
BH CV ECHO MEAS - LV MASS(C)DI: 105.5 GRAMS/M^2
BH CV ECHO MEAS - LV MAX PG: 3.1 MMHG
BH CV ECHO MEAS - LV MEAN PG: 2 MMHG
BH CV ECHO MEAS - LV SYSTOLIC VOL/BSA (12-30): 16.9 ML/M^2
BH CV ECHO MEAS - LV V1 MAX: 88.7 CM/SEC
BH CV ECHO MEAS - LV V1 MEAN: 59 CM/SEC
BH CV ECHO MEAS - LV V1 VTI: 19.5 CM
BH CV ECHO MEAS - LVIDD: 4.7 CM
BH CV ECHO MEAS - LVIDS: 3.4 CM
BH CV ECHO MEAS - LVLD AP2: 7 CM
BH CV ECHO MEAS - LVLD AP4: 7.1 CM
BH CV ECHO MEAS - LVLS AP2: 6.5 CM
BH CV ECHO MEAS - LVLS AP4: 5.6 CM
BH CV ECHO MEAS - LVOT AREA (M): 3.5 CM^2
BH CV ECHO MEAS - LVOT AREA: 3.5 CM^2
BH CV ECHO MEAS - LVOT DIAM: 2.1 CM
BH CV ECHO MEAS - LVPWD: 1.2 CM
BH CV ECHO MEAS - MED PEAK E' VEL: 6.6 CM/SEC
BH CV ECHO MEAS - MV A DUR: 0.16 SEC
BH CV ECHO MEAS - MV A MAX VEL: 68.4 CM/SEC
BH CV ECHO MEAS - MV DEC SLOPE: 136 CM/SEC^2
BH CV ECHO MEAS - MV DEC TIME: 0.18 SEC
BH CV ECHO MEAS - MV E MAX VEL: 66 CM/SEC
BH CV ECHO MEAS - MV E/A: 0.96
BH CV ECHO MEAS - MV MAX PG: 2.2 MMHG
BH CV ECHO MEAS - MV MEAN PG: 1 MMHG
BH CV ECHO MEAS - MV P1/2T MAX VEL: 61.1 CM/SEC
BH CV ECHO MEAS - MV P1/2T: 131.6 MSEC
BH CV ECHO MEAS - MV V2 MAX: 74.3 CM/SEC
BH CV ECHO MEAS - MV V2 MEAN: 46.2 CM/SEC
BH CV ECHO MEAS - MV V2 VTI: 23.2 CM
BH CV ECHO MEAS - MVA P1/2T LCG: 3.6 CM^2
BH CV ECHO MEAS - MVA(P1/2T): 1.7 CM^2
BH CV ECHO MEAS - MVA(VTI): 2.9 CM^2
BH CV ECHO MEAS - PA ACC TIME: 0.11 SEC
BH CV ECHO MEAS - PA MAX PG (FULL): 1.9 MMHG
BH CV ECHO MEAS - PA MAX PG: 3.8 MMHG
BH CV ECHO MEAS - PA PR(ACCEL): 30.4 MMHG
BH CV ECHO MEAS - PA V2 MAX: 97.7 CM/SEC
BH CV ECHO MEAS - PI END-D VEL: 97.7 CM/SEC
BH CV ECHO MEAS - PULM A REVS DUR: 0.17 SEC
BH CV ECHO MEAS - PULM A REVS VEL: 31 CM/SEC
BH CV ECHO MEAS - PULM DIAS VEL: 52.7 CM/SEC
BH CV ECHO MEAS - PULM S/D: 0.61
BH CV ECHO MEAS - PULM SYS VEL: 32.4 CM/SEC
BH CV ECHO MEAS - PVA(V,A): 2.7 CM^2
BH CV ECHO MEAS - PVA(V,D): 2.7 CM^2
BH CV ECHO MEAS - QP/QS: 0.74
BH CV ECHO MEAS - RAP SYSTOLE: 3 MMHG
BH CV ECHO MEAS - RV MAX PG: 1.9 MMHG
BH CV ECHO MEAS - RV MEAN PG: 1 MMHG
BH CV ECHO MEAS - RV V1 MAX: 68.7 CM/SEC
BH CV ECHO MEAS - RV V1 MEAN: 42.8 CM/SEC
BH CV ECHO MEAS - RV V1 VTI: 13.2 CM
BH CV ECHO MEAS - RVOT AREA: 3.8 CM^2
BH CV ECHO MEAS - RVOT DIAM: 2.2 CM
BH CV ECHO MEAS - RVSP: 28.6 MMHG
BH CV ECHO MEAS - SI(AO): 91.8 ML/M^2
BH CV ECHO MEAS - SI(CUBED): 32.1 ML/M^2
BH CV ECHO MEAS - SI(LVOT): 33.6 ML/M^2
BH CV ECHO MEAS - SI(MOD-SP2): 25.4 ML/M^2
BH CV ECHO MEAS - SI(MOD-SP4): 30.4 ML/M^2
BH CV ECHO MEAS - SI(TEICH): 27.3 ML/M^2
BH CV ECHO MEAS - SV(AO): 184.5 ML
BH CV ECHO MEAS - SV(CUBED): 64.5 ML
BH CV ECHO MEAS - SV(LVOT): 67.5 ML
BH CV ECHO MEAS - SV(MOD-SP2): 51 ML
BH CV ECHO MEAS - SV(MOD-SP4): 61 ML
BH CV ECHO MEAS - SV(RVOT): 50.2 ML
BH CV ECHO MEAS - SV(TEICH): 54.9 ML
BH CV ECHO MEAS - TAPSE (>1.6): 1.7 CM
BH CV ECHO MEAS - TR MAX VEL: 253 CM/SEC
BH CV ECHO MEASUREMENTS AVERAGE E/E' RATIO: 8.15
BH CV XLRA - RV BASE: 3.5 CM
BH CV XLRA - RV LENGTH: 6.7 CM
BH CV XLRA - RV MID: 2.4 CM
BH CV XLRA - TDI S': 8.5 CM/SEC
LEFT ATRIUM VOLUME INDEX: 22 ML/M2
MAXIMAL PREDICTED HEART RATE: 146 BPM
STRESS TARGET HR: 124 BPM

## 2019-12-26 ENCOUNTER — TELEPHONE (OUTPATIENT)
Dept: CARDIOLOGY | Facility: CLINIC | Age: 74
End: 2019-12-26

## 2019-12-26 ENCOUNTER — HOSPITAL ENCOUNTER (OUTPATIENT)
Dept: CARDIOLOGY | Facility: HOSPITAL | Age: 74
Discharge: HOME | End: 2019-12-26

## 2019-12-26 VITALS
DIASTOLIC BLOOD PRESSURE: 62 MMHG | WEIGHT: 202 LBS | HEIGHT: 66 IN | SYSTOLIC BLOOD PRESSURE: 118 MMHG | BODY MASS INDEX: 32.47 KG/M2 | RESPIRATION RATE: 16 BRPM | HEART RATE: 62 BPM | OXYGEN SATURATION: 96 %

## 2019-12-26 LAB
BH CV STRESS BP STAGE 1: NORMAL
BH CV STRESS COMMENTS STAGE 1: NORMAL
BH CV STRESS DOSE REGADENOSON STAGE 1: 0.4
BH CV STRESS DURATION MIN STAGE 1: 2
BH CV STRESS DURATION SEC STAGE 1: 32
BH CV STRESS GRADE STAGE 1: 0
BH CV STRESS HR STAGE 1: 88
BH CV STRESS METS STAGE 1: 1.6
BH CV STRESS PROTOCOL 1: NORMAL
BH CV STRESS RECOVERY BP: NORMAL MMHG
BH CV STRESS RECOVERY HR: 69 BPM
BH CV STRESS RECOVERY O2: 96 %
BH CV STRESS SPEED STAGE 1: 0.8
BH CV STRESS STAGE 1: 1
LV EF NUC BP: 60 %
MAXIMAL PREDICTED HEART RATE: 146 BPM
PERCENT MAX PREDICTED HR: 60.27 %
STRESS BASELINE BP: NORMAL MMHG
STRESS BASELINE HR: 63 BPM
STRESS O2 SAT REST: 96 %
STRESS PERCENT HR: 71 %
STRESS POST ESTIMATED WORKLOAD: 1.6 METS
STRESS POST EXERCISE DUR MIN: 2 MIN
STRESS POST EXERCISE DUR SEC: 32 SEC
STRESS POST PEAK BP: NORMAL MMHG
STRESS POST PEAK HR: 88 BPM
STRESS TARGET HR: 124 BPM

## 2019-12-26 PROCEDURE — 93018 CV STRESS TEST I&R ONLY: CPT | Performed by: INTERNAL MEDICINE

## 2019-12-26 PROCEDURE — 78452 HT MUSCLE IMAGE SPECT MULT: CPT | Performed by: INTERNAL MEDICINE

## 2019-12-26 PROCEDURE — 25010000002 REGADENOSON 0.4 MG/5ML SOLUTION: Performed by: INTERNAL MEDICINE

## 2019-12-26 PROCEDURE — 78452 HT MUSCLE IMAGE SPECT MULT: CPT

## 2019-12-26 PROCEDURE — A9500 TC99M SESTAMIBI: HCPCS | Performed by: INTERNAL MEDICINE

## 2019-12-26 PROCEDURE — 93017 CV STRESS TEST TRACING ONLY: CPT

## 2019-12-26 PROCEDURE — 0 TECHNETIUM SESTAMIBI: Performed by: INTERNAL MEDICINE

## 2019-12-26 PROCEDURE — 93016 CV STRESS TEST SUPVJ ONLY: CPT | Performed by: INTERNAL MEDICINE

## 2019-12-26 RX ADMIN — REGADENOSON 0.4 MG: 0.08 INJECTION, SOLUTION INTRAVENOUS at 09:56

## 2019-12-26 RX ADMIN — TECHNETIUM TC 99M SESTAMIBI 1 DOSE: 1 INJECTION INTRAVENOUS at 09:55

## 2019-12-26 RX ADMIN — TECHNETIUM TC 99M SESTAMIBI 1 DOSE: 1 INJECTION INTRAVENOUS at 07:41

## 2019-12-26 NOTE — TELEPHONE ENCOUNTER
----- Message from Shital Stafford sent at 12/26/2019 10:40 AM EST -----  Regarding: Cardiac Clearance  Contact: [phone]  Cardiac Clearance for Right Total Hip Replacement 1/13  Shital Mckeon's office [phone]  Fax #[phone]      OV  12/19/19    ECHO  12/25/19    STRESS TODAY 12/26/19      WILL DISCUSS WITH DR SCHULTZ AFTER TESTING IS DONE     Pt was cleared in office today

## 2020-01-06 ENCOUNTER — OFFICE VISIT (OUTPATIENT)
Dept: CARDIOLOGY | Facility: CLINIC | Age: 75
End: 2020-01-06

## 2020-01-06 VITALS
WEIGHT: 203 LBS | HEART RATE: 36 BPM | HEIGHT: 66 IN | SYSTOLIC BLOOD PRESSURE: 121 MMHG | BODY MASS INDEX: 32.62 KG/M2 | DIASTOLIC BLOOD PRESSURE: 65 MMHG

## 2020-01-06 DIAGNOSIS — I25.10 CHRONIC CORONARY ARTERY DISEASE: ICD-10-CM

## 2020-01-06 DIAGNOSIS — Z01.818 PREOPERATIVE EVALUATION OF A MEDICAL CONDITION TO RULE OUT SURGICAL CONTRAINDICATIONS (TAR REQUIRED): Primary | ICD-10-CM

## 2020-01-06 DIAGNOSIS — R06.02 SOB (SHORTNESS OF BREATH): ICD-10-CM

## 2020-01-06 PROCEDURE — 99213 OFFICE O/P EST LOW 20 MIN: CPT | Performed by: INTERNAL MEDICINE

## 2020-01-06 NOTE — PROGRESS NOTES
Subjective:        José Moody is a 74 y.o. male who here for follow up    CC  Hip surgery  HPI  74-year-old with coronary artery disease shortness of breath needs a preop evaluation for the hip surgery denies any chest pains or tightness no chest     Problem List Items Addressed This Visit        Cardiovascular and Mediastinum    Chronic coronary artery disease       Respiratory    SOB (shortness of breath)      Other Visit Diagnoses     Preoperative evaluation of a medical condition to rule out surgical contraindications (TAR required)    -  Primary      Interpretation Summary        · Findings consistent with a normal ECG stress test.  · Left ventricular ejection fraction is normal (Calculated EF = 60%).  · Myocardial perfusion imaging indicates a normal myocardial perfusion study with no evidence of ischemia.  · Impressions are consistent with a low risk study.     Interpretation Summary     · Right ventricular cavity is borderline dilated.  · Left atrial cavity size is borderline dilated.  · Calculated EF = 60%  · There is no evidence of pericardial effusion.       .    The following portions of the patient's history were reviewed and updated as appropriate: allergies, current medications, past family history, past medical history, past social history, past surgical history and problem list.    Past Medical History:   Diagnosis Date   • Anxiety    • Arthritis     LT HIP   • Atrial fibrillation (CMS/HCC)    • Coronary artery disease    • High cholesterol    • History of atrial fibrillation     LAST HAD 1-2016   • Limited joint range of motion     LT HIP   • Osteoarthritis of both hips    • PONV (postoperative nausea and vomiting)     AFTER TONSILLECTOMY     reports that he quit smoking about 19 years ago. His smoking use included cigarettes. He has a 13.50 pack-year smoking history. He has never used smokeless tobacco. He reports that he does not drink alcohol or use drugs.   Family History   Problem  "Relation Age of Onset   • Heart failure Brother    • Heart disease Brother    • Heart attack Brother    • Malig Hyperthermia Neg Hx    • Hypertension Neg Hx    • Hyperlipidemia Neg Hx        Review of Systems  Constitutional: No wt loss, fever, fatigue  Gastrointestinal: No nausea, abdominal pain  Behavioral/Psych: No insomnia or anxiety   Cardiovascular no chest pains or tightness in the chest  Objective:       Physical Exam  /65   Pulse (!) 36   Ht 167.6 cm (66\")   Wt 92.1 kg (203 lb)   BMI 32.77 kg/m²   General appearance: No acute changes   Neck: Trachea midline; NECK, supple, no thyromegaly or lymphadenopathy   Lungs: Normal size and shape, normal breath sounds, equal distribution of air, no rales and rhonchi   CV: S1-S2 regular, no murmurs, no rub, no gallop   Abdomen: Soft, non-tender; no masses , no abnormal abdominal sounds   Extremities: No deformity , normal color , no peripheral edema   Skin: Normal temperature, turgor and texture; no rash, ulcers          Procedures      Echocardiogram:        Current Outpatient Medications:   •  aspirin 81 MG EC tablet, Take 81 mg by mouth Daily., Disp: , Rfl:   •  atorvastatin (LIPITOR) 20 MG tablet, Take 20 mg by mouth Every Evening., Disp: , Rfl:   •  busPIRone (BUSPAR) 15 MG tablet, Take 15 mg by mouth 2 (Two) Times a Day., Disp: , Rfl:   •  DULoxetine (CYMBALTA) 60 MG capsule, Take 1 capsule by mouth Daily., Disp: , Rfl: 4  •  ibuprofen (ADVIL,MOTRIN) 400 MG tablet, Take 400 mg by mouth Every 6 (Six) Hours As Needed for Mild Pain . HOLD PRIOR TO SURGERY, Disp: , Rfl:   •  metoprolol succinate XL (TOPROL-XL) 50 MG 24 hr tablet, 75 mg in am and 50 mg in pm, Disp: 225 tablet, Rfl: 1  •  [START ON 1/8/2020] mupirocin (BACTROBAN) 2 % ointment, Apply 1 application topically to the appropriate area as directed 2 (Two) Times a Day. 5 days before surgery put small amount in each nostril, Disp: , Rfl:   •  terazosin (HYTRIN) 10 MG capsule, Take 10 mg by mouth " Daily., Disp: , Rfl: 3   Assessment:        Patient Active Problem List   Diagnosis   • Abnormal thallium stress test   • Atrial fibrillation (CMS/HCC)   • S/P CABG (coronary artery bypass graft)   • Chest pain   • Hyperlipidemia   • Shortness of breath   • Weak pulse   • SOB (shortness of breath)   • Chronic coronary artery disease   • Degenerative joint disease (DJD) of hip   • Palpitations   • Bilateral pulmonary infiltrates on chest x-ray               Plan:            ICD-10-CM ICD-9-CM   1. Preoperative evaluation of a medical condition to rule out surgical contraindications (TAR required) Z01.818 V72.83   2. SOB (shortness of breath) R06.02 786.05   3. Chronic coronary artery disease I25.10 414.00     1. Preoperative evaluation of a medical condition to rule out surgical contraindications (TAR required)  Patient cleared for the surgery    2. SOB (shortness of breath)  Better    3. Chronic coronary artery disease  No chest pain       Specificity and sensitivity of the stress test/ cardiac workup has been explained. Pt has been explained if  Symptoms continue please go to ER, and further w/p will be required.    Also explained this does not rule out coronary artery disease or the future events, continue to emphasize on risk reductions for coronary artery disease    Pt also advised to contact PCP for other causes of symptoms    José Moody seen and examined with no clinical signs of angina or chf, pt is cleared for surgery with non modifiable risk factors.  José Moody has been advised to take cardiac meds with sip of water on the day of surgery.    Please use beta blocker for tachycardia perioperatively    Anticoagulation to be managed appropriately    Watch for chest pain, shortness of breath, palpitations, arrhythmias, and significant change in the blood pressure perioperatively,     Please check EKG preop and postop if any questions, notify us if any change in patient's cardiovascular  conditions    COUNSELING:    José Chavez was given to patient for the following topics: diagnostic results, risk factor reductions, impressions, risks and benefits of treatment options and importance of treatment compliance .       SMOKING COUNSELING:    [unfilled]    Dictated using Dragon dictation

## 2020-06-29 ENCOUNTER — OFFICE VISIT (OUTPATIENT)
Dept: CARDIOLOGY | Facility: CLINIC | Age: 75
End: 2020-06-29

## 2020-06-29 VITALS
SYSTOLIC BLOOD PRESSURE: 140 MMHG | HEART RATE: 74 BPM | HEIGHT: 66 IN | WEIGHT: 205 LBS | BODY MASS INDEX: 32.95 KG/M2 | DIASTOLIC BLOOD PRESSURE: 84 MMHG

## 2020-06-29 DIAGNOSIS — I48.0 AF (PAROXYSMAL ATRIAL FIBRILLATION) (HCC): ICD-10-CM

## 2020-06-29 DIAGNOSIS — E78.5 HYPERLIPIDEMIA, UNSPECIFIED HYPERLIPIDEMIA TYPE: ICD-10-CM

## 2020-06-29 DIAGNOSIS — I25.10 CHRONIC CORONARY ARTERY DISEASE: ICD-10-CM

## 2020-06-29 DIAGNOSIS — Z95.1 S/P CABG (CORONARY ARTERY BYPASS GRAFT): Primary | ICD-10-CM

## 2020-06-29 PROCEDURE — 99214 OFFICE O/P EST MOD 30 MIN: CPT | Performed by: INTERNAL MEDICINE

## 2020-06-29 PROCEDURE — 93000 ELECTROCARDIOGRAM COMPLETE: CPT | Performed by: INTERNAL MEDICINE

## 2020-06-29 RX ORDER — METOPROLOL TARTRATE 50 MG/1
25 TABLET, FILM COATED ORAL 2 TIMES DAILY
COMMUNITY
Start: 2020-06-21 | End: 2020-07-20 | Stop reason: SDUPTHER

## 2020-06-29 NOTE — PROGRESS NOTES
José RESENDIZ Fransisco  1945  Date of Office Visit: 06/29/20  Encounter Provider: Allen Watkins MD  Place of Service: [facility] CARDIOLOGY      CHIEF COMPLAINT:  Fatigue  Dyspnea on exertion    HISTORY OF PRESENT ILLNESS:I had the pleasure of seeing the patient back in followup. He is a 75-year-old male with a medical history of paroxysmal atrial fibrillation, coronary artery disease with prior CABG, preserved ejection fraction, prior normal stress test in late 2019, who presents to me for shortness of breath and fatigue. He states that he is tired all of the time and actually wakes up fatigued. He denies snoring or prior testing for sleep apnea. He does state that he occasionally has palpitations and feels his heart pounding. However, this does not last for a prolonged period of time and tends to last less than 5 minutes.       Review of Systems   Constitution: Positive for malaise/fatigue. Negative for fever.   HENT: Negative for nosebleeds and sore throat.    Eyes: Negative for blurred vision and double vision.   Cardiovascular: Positive for dyspnea on exertion. Negative for chest pain, claudication, palpitations and syncope.   Respiratory: Negative for cough, shortness of breath and snoring.    Endocrine: Negative for cold intolerance, heat intolerance and polydipsia.   Skin: Negative for itching, poor wound healing and rash.   Musculoskeletal: Negative for joint pain, joint swelling, muscle weakness and myalgias.   Gastrointestinal: Negative for abdominal pain, melena, nausea and vomiting.   Neurological: Negative for light-headedness, loss of balance, seizures, vertigo and weakness.   Psychiatric/Behavioral: Negative for altered mental status and depression.       Past Medical History:   Diagnosis Date   • Anxiety    • Arthritis     LT HIP   • Atrial fibrillation (CMS/HCC)    • Coronary artery disease    • High cholesterol    • History of atrial fibrillation     LAST HAD  "1-2016   • Limited joint range of motion     LT HIP   • Osteoarthritis of both hips    • PONV (postoperative nausea and vomiting)     AFTER TONSILLECTOMY       The following portions of the patient's history were reviewed and updated as appropriate: Social history , Family history and Surgical history     Current Outpatient Medications on File Prior to Visit   Medication Sig Dispense Refill   • aspirin 81 MG EC tablet Take 81 mg by mouth Daily.     • atorvastatin (LIPITOR) 20 MG tablet Take 20 mg by mouth Every Evening.     • busPIRone (BUSPAR) 15 MG tablet Take 15 mg by mouth 2 (Two) Times a Day.     • DULoxetine (CYMBALTA) 60 MG capsule Take 1 capsule by mouth Daily.  4   • metoprolol tartrate (LOPRESSOR) 50 MG tablet 75mg am and 50mg pm     • terazosin (HYTRIN) 10 MG capsule Take 10 mg by mouth Daily.  3   • [DISCONTINUED] ibuprofen (ADVIL,MOTRIN) 400 MG tablet Take 400 mg by mouth Every 6 (Six) Hours As Needed for Mild Pain . HOLD PRIOR TO SURGERY     • [DISCONTINUED] metoprolol succinate XL (TOPROL-XL) 50 MG 24 hr tablet 75 mg in am and 50 mg in pm 225 tablet 1     No current facility-administered medications on file prior to visit.        No Known Allergies    Vitals:    06/29/20 1519   BP: 140/84   Pulse: 74   Weight: 93 kg (205 lb)   Height: 167.6 cm (66\")     Physical Exam   Constitutional: He is oriented to person, place, and time. He appears well-developed and well-nourished.   HENT:   Head: Normocephalic and atraumatic.   Eyes: Conjunctivae and EOM are normal. No scleral icterus.   Neck: Normal range of motion. Neck supple. Normal carotid pulses, no hepatojugular reflux and no JVD present. Carotid bruit is not present. No tracheal deviation present. No thyromegaly present.   Cardiovascular: Normal rate and regular rhythm. Exam reveals no gallop and no friction rub.   No murmur heard.  Pulses:       Carotid pulses are 2+ on the right side, and 2+ on the left side.       Radial pulses are 2+ on the right " side, and 2+ on the left side.        Femoral pulses are 2+ on the right side, and 2+ on the left side.       Dorsalis pedis pulses are 2+ on the right side, and 2+ on the left side.        Posterior tibial pulses are 2+ on the right side, and 2+ on the left side.   Pulmonary/Chest: No respiratory distress. He has no decreased breath sounds. He has no wheezes. He has no rhonchi. He has rales (fine rales bilateral bases). He exhibits no tenderness.   Sternotomy   Abdominal: Soft. Bowel sounds are normal. He exhibits no distension. There is no tenderness. There is no rebound.   Musculoskeletal: He exhibits no edema or deformity.   Neurological: He is alert and oriented to person, place, and time. He has normal strength. No sensory deficit.   Skin: No rash noted. No erythema.   Psychiatric: He has a normal mood and affect. His behavior is normal.          Lab Results   Component Value Date    WBC 11.65 (H) 01/14/2020    HGB 10.6 (L) 01/14/2020    HCT 32.0 (L) 01/14/2020    MCV 89.1 01/14/2020     01/14/2020       Lab Results   Component Value Date    GLUCOSE 154 (H) 05/01/2018    BUN 15 01/14/2020    CREATININE 0.8 01/14/2020    EGFRIFNONA 75 05/01/2018    BCR 18.8 01/14/2020    K 4.1 01/14/2020    CO2 23 01/14/2020    CALCIUM 8.0 (L) 01/14/2020    ALBUMIN 4.10 04/16/2018    AST 17 04/16/2018    ALT 18 04/16/2018       Lab Results   Component Value Date    GLUCOSE 154 (H) 05/01/2018    CALCIUM 8.0 (L) 01/14/2020     (L) 01/14/2020    K 4.1 01/14/2020    CO2 23 01/14/2020     01/14/2020    BUN 15 01/14/2020    CREATININE 0.8 01/14/2020    EGFRIFNONA 75 05/01/2018    BCR 18.8 01/14/2020    ANIONGAP 13.0 05/01/2018       Lab Results   Component Value Date    CHOL 154 07/10/2017    TRIG 85 07/10/2017    HDL 44 07/10/2017    LDL 93 07/10/2017         ECG 12 Lead  Date/Time: 6/29/2020 4:26 PM  Performed by: Allen Watkins MD  Authorized by: Allen Watkins MD   Comparison: compared with  previous ECG from 12/19/2019  Similar to previous ECG  Rhythm: sinus rhythm  Rate: normal  Conduction: right bundle branch block             Results for orders placed in visit on 12/19/19   Adult Transthoracic Echo Complete W/ Cont if Necessary Per Protocol    Narrative · Right ventricular cavity is borderline dilated.  · Left atrial cavity size is borderline dilated.  · Calculated EF = 60%  · There is no evidence of pericardial effusion.          DISCUSSION/SUMMARY  This is a 75-year-old male with a medical history of coronary artery disease, prior CABG, hyperlipidemia, palpitations, paroxysmal atrial fibrillation, who presents back for follow up.  His main complaints at this point in time is dyspnea on exertion and fatigue.  He has normal left and right ventricular systolic function.  He has also recently had a stress test in late 2019 with no evidence of ischemia.  I do not think his fatigue and dyspnea are secondary to his cardiac disease.  He also appears to be euvolemic.  He was last imaged from a pulmonary standpoint back in 2017 and at that time, there was concern for organizing pneumonia.  He does not appear to have followed up with pulmonary physicians and I do think this needs to be explored as far as an etiology for his dyspnea.     1.  Coronary disease with prior CABG.  - Continue aspirin and statin therapy.  - Continue Lopressor, but I will decrease the dose due to fatigue to 25 mg p.o. b.i.d.  2.  Paroxysmal atrial fibrillation.  - I would recommend that he wear a Zio patch as we are decreasing his metoprolol to 25 mg p.o. b.i.d. and I do worry that he could have increased atrial fibrillation burden, which would require electrophysiology involvement.  3.  Hyperlipidemia.  Continue atorvastatin therapy.  No change in current dose.  4.  Dyspnea.  Recommend pulmonary involvement as well with prior abnormal CT scan and evidence of rales at the bases.

## 2020-07-06 ENCOUNTER — TRANSCRIBE ORDERS (OUTPATIENT)
Dept: ADMINISTRATIVE | Facility: HOSPITAL | Age: 75
End: 2020-07-06

## 2020-07-06 DIAGNOSIS — J84.9 ILD (INTERSTITIAL LUNG DISEASE) (HCC): Primary | ICD-10-CM

## 2020-07-10 ENCOUNTER — TELEPHONE (OUTPATIENT)
Dept: CARDIOLOGY | Facility: CLINIC | Age: 75
End: 2020-07-10

## 2020-07-10 ENCOUNTER — HOSPITAL ENCOUNTER (OUTPATIENT)
Dept: CT IMAGING | Facility: HOSPITAL | Age: 75
Discharge: HOME | End: 2020-07-10
Admitting: INTERNAL MEDICINE

## 2020-07-10 DIAGNOSIS — J84.9 ILD (INTERSTITIAL LUNG DISEASE) (HCC): ICD-10-CM

## 2020-07-10 PROCEDURE — 71250 CT THORAX DX C-: CPT

## 2020-07-10 NOTE — TELEPHONE ENCOUNTER
"Pt's daughter called. She has concerns about her dad's symptoms. She said his symptoms are similar to her Step-dad's symptoms who just had a \"massive heart attack\" the other day.  She said that he is more tired , short pf breath is the same, but now he is having daily lightheadedness. Even with the decrease in his metoprolol tart from 75mg am and 50mg pm to 25mg bid.    I called the pt and his BP/HR is as follows:  /73  and 99.  He said they are similar symptoms to when he had his heart attack years ago before the CABG.    He also has a CT of his chest as well today and he mailed his Zio Monitor yesterday.    He can be reached at #[phone].  Please advise.    ThanksFelisha      On 6/29/20:  DISCUSSION/SUMMARY  This is a 75-year-old male with a medical history of coronary artery disease, prior CABG, hyperlipidemia, palpitations, paroxysmal atrial fibrillation, who presents back for follow up.  His main complaints at this point in time is dyspnea on exertion and fatigue.  He has normal left and right ventricular systolic function.  He has also recently had a stress test in late 2019 with no evidence of ischemia.  I do not think his fatigue and dyspnea are secondary to his cardiac disease.  He also appears to be euvolemic.  He was last imaged from a pulmonary standpoint back in 2017 and at that time, there was concern for organizing pneumonia.  He does not appear to have followed up with pulmonary physicians and I do think this needs to be explored as far as an etiology for his dyspnea.     1.  Coronary disease with prior CABG.  - Continue aspirin and statin therapy.  - Continue Lopressor, but I will decrease the dose due to fatigue to 25 mg p.o. b.i.d.  2.  Paroxysmal atrial fibrillation.  - I would recommend that he wear a Zio patch as we are decreasing his metoprolol to 25 mg p.o. b.i.d. and I do worry that he could have increased atrial fibrillation burden, which would require electrophysiology " involvement.  3.  Hyperlipidemia.  Continue atorvastatin therapy.  No change in current dose.  4.  Dyspnea.  Recommend pulmonary involvement as well with prior abnormal CT scan and evidence of rales at the bases.

## 2020-07-15 NOTE — TELEPHONE ENCOUNTER
"Pt's daughter Yazmin called today.  See below.  She said that he had a lung scan with Dr. Stallworth on 7/10/20 and it is in Epic.  She would also like to know what his Zio monitor results are. Not sure if you have the strips yet.  She is really concerned because he seems to be doing worse lately. She was also wanting to know if he needed another \"scan\".  He can be reached at #[phone] or she can be reached at #[phone].  Please advise.    Thanks,  Felisha    "

## 2020-07-16 NOTE — TELEPHONE ENCOUNTER
If he is doing poorly I would recommend that we get him into see 1 of the nurse practitioners in the next week.  Preferably on a day I am here

## 2020-07-17 ENCOUNTER — APPOINTMENT (OUTPATIENT)
Dept: CT IMAGING | Facility: HOSPITAL | Age: 75
End: 2020-07-17

## 2020-07-20 ENCOUNTER — OFFICE VISIT (OUTPATIENT)
Dept: CARDIOLOGY | Facility: CLINIC | Age: 75
End: 2020-07-20

## 2020-07-20 ENCOUNTER — LAB (OUTPATIENT)
Dept: LAB | Facility: HOSPITAL | Age: 75
End: 2020-07-20

## 2020-07-20 ENCOUNTER — TELEPHONE (OUTPATIENT)
Dept: CARDIOLOGY | Facility: CLINIC | Age: 75
End: 2020-07-20

## 2020-07-20 VITALS
HEART RATE: 88 BPM | BODY MASS INDEX: 33.17 KG/M2 | DIASTOLIC BLOOD PRESSURE: 72 MMHG | WEIGHT: 206.4 LBS | SYSTOLIC BLOOD PRESSURE: 130 MMHG | OXYGEN SATURATION: 95 % | RESPIRATION RATE: 18 BRPM | HEIGHT: 66 IN

## 2020-07-20 DIAGNOSIS — I48.0 PAROXYSMAL ATRIAL FIBRILLATION (HCC): ICD-10-CM

## 2020-07-20 DIAGNOSIS — R53.83 OTHER FATIGUE: ICD-10-CM

## 2020-07-20 DIAGNOSIS — R06.02 SHORTNESS OF BREATH: ICD-10-CM

## 2020-07-20 DIAGNOSIS — Z95.1 S/P CABG (CORONARY ARTERY BYPASS GRAFT): ICD-10-CM

## 2020-07-20 DIAGNOSIS — R06.02 SHORTNESS OF BREATH: Primary | ICD-10-CM

## 2020-07-20 DIAGNOSIS — I25.10 CHRONIC CORONARY ARTERY DISEASE: ICD-10-CM

## 2020-07-20 LAB
ALBUMIN SERPL-MCNC: 4 G/DL (ref 3.5–5.2)
ALBUMIN/GLOB SERPL: 1.5 G/DL
ALP SERPL-CCNC: 91 U/L (ref 39–117)
ALT SERPL W P-5'-P-CCNC: 18 U/L (ref 1–41)
ANION GAP SERPL CALCULATED.3IONS-SCNC: 10.4 MMOL/L (ref 5–15)
AST SERPL-CCNC: 16 U/L (ref 1–40)
BILIRUB SERPL-MCNC: 0.5 MG/DL (ref 0–1.2)
BUN SERPL-MCNC: 13 MG/DL (ref 8–23)
BUN/CREAT SERPL: 13.5 (ref 7–25)
CALCIUM SPEC-SCNC: 8.5 MG/DL (ref 8.6–10.5)
CHLORIDE SERPL-SCNC: 105 MMOL/L (ref 98–107)
CO2 SERPL-SCNC: 20.6 MMOL/L (ref 22–29)
CREAT SERPL-MCNC: 0.96 MG/DL (ref 0.76–1.27)
DEPRECATED RDW RBC AUTO: 47.6 FL (ref 37–54)
ERYTHROCYTE [DISTWIDTH] IN BLOOD BY AUTOMATED COUNT: 16.4 % (ref 12.3–15.4)
GFR SERPL CREATININE-BSD FRML MDRD: 76 ML/MIN/1.73
GLOBULIN UR ELPH-MCNC: 2.7 GM/DL
GLUCOSE SERPL-MCNC: 111 MG/DL (ref 65–99)
HCT VFR BLD AUTO: 39 % (ref 37.5–51)
HGB BLD-MCNC: 13.3 G/DL (ref 13–17.7)
MCH RBC QN AUTO: 27.6 PG (ref 26.6–33)
MCHC RBC AUTO-ENTMCNC: 34.1 G/DL (ref 31.5–35.7)
MCV RBC AUTO: 80.9 FL (ref 79–97)
NT-PROBNP SERPL-MCNC: 46.4 PG/ML (ref 0–1800)
PLATELET # BLD AUTO: 219 10*3/MM3 (ref 140–450)
PMV BLD AUTO: 7.9 FL (ref 6–12)
POTASSIUM SERPL-SCNC: 4.2 MMOL/L (ref 3.5–5.2)
PROT SERPL-MCNC: 6.7 G/DL (ref 6–8.5)
RBC # BLD AUTO: 4.82 10*6/MM3 (ref 4.14–5.8)
SODIUM SERPL-SCNC: 136 MMOL/L (ref 136–145)
WBC # BLD AUTO: 7.36 10*3/MM3 (ref 3.4–10.8)

## 2020-07-20 PROCEDURE — 85027 COMPLETE CBC AUTOMATED: CPT

## 2020-07-20 PROCEDURE — 36415 COLL VENOUS BLD VENIPUNCTURE: CPT

## 2020-07-20 PROCEDURE — 83880 ASSAY OF NATRIURETIC PEPTIDE: CPT

## 2020-07-20 PROCEDURE — 99214 OFFICE O/P EST MOD 30 MIN: CPT | Performed by: NURSE PRACTITIONER

## 2020-07-20 PROCEDURE — 93000 ELECTROCARDIOGRAM COMPLETE: CPT | Performed by: NURSE PRACTITIONER

## 2020-07-20 PROCEDURE — 80053 COMPREHEN METABOLIC PANEL: CPT

## 2020-07-20 RX ORDER — METOPROLOL TARTRATE 50 MG/1
50 TABLET, FILM COATED ORAL 2 TIMES DAILY
Start: 2020-07-20

## 2020-07-20 NOTE — TELEPHONE ENCOUNTER
Requested TREVOR from Dr. Stallworth's Office, [facility]. Awaiting records now.    SHERLY Trejo

## 2020-07-20 NOTE — PROGRESS NOTES
Date of Office Visit: 2020  Encounter Provider: KORI Smith  Place of Service: [facility] CARDIOLOGY  Patient Name: José Moody  :1945    Chief Complaint   Patient presents with   • Shortness of Breath     FOLLOW UP   • Dizziness   • Fatigue   :     HPI: José Moody is a 75 y.o. male, new to me, who presents today for follow-up.  Old records have been obtained and reviewed by me.  He is a patient of Dr. Watkins's with a past cardiac history significant for atrial fibrillation and coronary artery disease (history of CABG in ).  In 2019, he had an echocardiogram revealing normal LV function and no significant valvular abnormalities.  Additionally, he underwent nuclear perfusion testing which revealed no evidence of ischemia.     He was last seen in the office by Dr. Watkins on 2024 for evaluation of dyspnea on exertion and fatigue.  Dr. Watkins did not feel his fatigue and dyspnea were secondary to cardiac disease.  He decreased the Lopressor to 25 mg twice daily and recommended a ZIO patch to assess for increased atrial fibrillation burden.  Dr. Watkins also recommended pulmonary involvement with prior abnormal CT scan and evidence of rales at the bases.   On 7/10/2020, the patient's daughter called the office with concerns about her father including increased fatigue, shortness of breath, and a new report of lightheadedness.  He was scheduled to see me today.   The patient is still reporting fatigue and shortness of breath.  He is short of breath all the time but especially with exertion.  He denies any PND, orthopnea, weight gain, or edema.  He has also been feeling lightheaded, mostly with position changes or with bending over.  He denies any syncope.  He reports that he has always had palpitations ever since his bypass surgery.  Sometimes they occur 3 times a day.  He also notices them at night.  He denies any chest pain.  He has not  been on anticoagulation for a number of years.  He does not really understand the indication for anticoagulation and seems to think that Dr. Mosqueda did not want him on it.    Past Medical History:   Diagnosis Date   • Anxiety    • Arthritis     LT HIP   • Atrial fibrillation (CMS/HCC)    • Coronary artery disease    • High cholesterol    • History of atrial fibrillation     LAST HAD    • Limited joint range of motion     LT HIP   • Osteoarthritis of both hips    • PONV (postoperative nausea and vomiting)     AFTER TONSILLECTOMY       Past Surgical History:   Procedure Laterality Date   • BRONCHOSCOPY Bilateral 2017    Procedure: BRONCHOSCOPY WITH BAL RIGHT AND LEFT UPPER LOBE;  Surgeon: Alin Stallworth MD;  Location: [facility] ENDOSCOPY;  Service:    • CARDIAC SURGERY     • CARPAL TUNNEL RELEASE Right    • COLONOSCOPY     • CORONARY ARTERY BYPASS GRAFT      3 VESSELS    • JOINT REPLACEMENT     • TONSILLECTOMY     • TOTAL HIP ARTHROPLASTY Left 3/9/2017    Procedure: LT TOTAL HIP ARTHROPLASTY;  Surgeon: José Machado MD;  Location: [facility] OR;  Service:    • TOTAL HIP ARTHROPLASTY REVISION Left 2018    Procedure: LT FEMORAL REVISION;  Surgeon: José Machado MD;  Location: [facility] OR;  Service: Orthopedics       Social History     Socioeconomic History   • Marital status: Single     Spouse name: Not on file   • Number of children: Not on file   • Years of education: Not on file   • Highest education level: Not on file   Tobacco Use   • Smoking status: Former Smoker     Packs/day: 0.50     Years: 27.00     Pack years: 13.50     Types: Cigarettes     Last attempt to quit:      Years since quittin.5   • Smokeless tobacco: Never Used   • Tobacco comment:  NO CAFFEINE USE   Substance and Sexual Activity   • Alcohol use: No   • Drug use: No   • Sexual activity: Defer       Family History   Problem Relation Age of Onset   • Heart failure Brother    • Heart disease  "Brother    • Heart attack Brother    • No Known Problems Mother    • No Known Problems Father    • Malig Hyperthermia Neg Hx    • Hypertension Neg Hx    • Hyperlipidemia Neg Hx        Review of Systems   Constitution: Positive for malaise/fatigue. Negative for chills and fever.   Cardiovascular: Positive for dyspnea on exertion and palpitations. Negative for chest pain, leg swelling, near-syncope, orthopnea, paroxysmal nocturnal dyspnea and syncope.   Respiratory: Positive for shortness of breath. Negative for cough.    Musculoskeletal: Negative for joint pain, joint swelling and myalgias.   Gastrointestinal: Negative for abdominal pain, diarrhea, melena, nausea and vomiting.   Genitourinary: Negative for frequency and hematuria.   Neurological: Positive for light-headedness. Negative for numbness, paresthesias and seizures.   Allergic/Immunologic: Negative.    All other systems reviewed and are negative.      No Known Allergies      Current Outpatient Medications:   •  aspirin 81 MG EC tablet, Take 81 mg by mouth Daily., Disp: , Rfl:   •  atorvastatin (LIPITOR) 20 MG tablet, Take 20 mg by mouth Every Evening., Disp: , Rfl:   •  busPIRone (BUSPAR) 15 MG tablet, Take 15 mg by mouth 2 (Two) Times a Day., Disp: , Rfl:   •  DULoxetine (CYMBALTA) 60 MG capsule, Take 1 capsule by mouth Daily., Disp: , Rfl: 4  •  metoprolol tartrate (LOPRESSOR) 50 MG tablet, Take 1 tablet by mouth 2 (Two) Times a Day., Disp: , Rfl:   •  terazosin (HYTRIN) 10 MG capsule, Take 10 mg by mouth Daily., Disp: , Rfl: 3  •  apixaban (ELIQUIS) 5 MG tablet tablet, Take 1 tablet by mouth Every 12 (Twelve) Hours., Disp: 60 tablet, Rfl: 11      Objective:     Vitals:    07/20/20 1039 07/20/20 1053   BP: 136/78 130/72   BP Location: Right arm Left arm   Patient Position: Sitting Sitting   Pulse: 88    Resp: 18    SpO2: 95%    Weight: 93.6 kg (206 lb 6.4 oz)    Height: 167.6 cm (66\")      Body mass index is 33.31 kg/m².    PHYSICAL EXAM:    Physical Exam "   Constitutional: He is oriented to person, place, and time. He appears well-developed and well-nourished. No distress.   HENT:   Head: Normocephalic and atraumatic.   Eyes: Pupils are equal, round, and reactive to light.   Neck: No JVD present. No thyromegaly present.   Cardiovascular: Normal rate, regular rhythm, normal heart sounds and intact distal pulses.   No murmur heard.  Pulmonary/Chest: Effort normal. No respiratory distress. He has decreased breath sounds in the right lower field and the left lower field. He has rales in the right lower field and the left lower field.   Abdominal: Soft. Bowel sounds are normal. He exhibits no distension. There is no splenomegaly or hepatomegaly. There is no tenderness.   Musculoskeletal: Normal range of motion. He exhibits no edema.   Neurological: He is alert and oriented to person, place, and time.   Skin: Skin is warm and dry. He is not diaphoretic. No erythema.   Psychiatric: He has a normal mood and affect. His behavior is normal. Judgment normal.         ECG 12 Lead  Date/Time: 7/20/2020 10:59 AM  Performed by: Cassi Fabian APRN  Authorized by: Cassi Fabian APRN   Comparison: compared with previous ECG from 6/29/2020  Similar to previous ECG  Rhythm: sinus rhythm  Rate: normal  BPM: 73  Conduction: right bundle branch block  Other findings: left atrial abnormality    Clinical impression: abnormal EKG  Comments: Indication: Paroxysmal atrial fibrillation              Assessment:       Diagnosis Plan   1. Shortness of breath  proBNP    CBC (No Diff)    Comprehensive Metabolic Panel   2. Other fatigue  CBC (No Diff)    Comprehensive Metabolic Panel   3. Paroxysmal atrial fibrillation (CMS/HCC)  ECG 12 Lead    Ambulatory Referral to Cardiac Electrophysiology   4. Chronic coronary artery disease     5. S/P CABG (coronary artery bypass graft)       Orders Placed This Encounter   Procedures   • proBNP     Standing Status:   Future     Standing Expiration  Date:   7/20/2021   • CBC (No Diff)     Standing Status:   Future     Standing Expiration Date:   7/20/2021   • Comprehensive Metabolic Panel     Standing Status:   Future     Standing Expiration Date:   7/20/2021   • Ambulatory Referral to Cardiac Electrophysiology     Referral Priority:   Routine     Referral Type:   Consultation     Referral Reason:   Specialty Services Required     Requested Specialty:   Cardiac Electrophysiology     Number of Visits Requested:   1   • ECG 12 Lead     This order was created via procedure documentation          Plan:       1.  Dyspnea.  He does not appear to be in any acute heart failure.  He appears euvolemic.  He denies any PND, orthopnea, weight gain, or edema.  Echocardiogram from December revealed normal LV function with an EF of 60%, normal LV cavity size and thickness, no wall motion abnormalities, and no significant valvular abnormalities.   CT of the chest from earlier this month revealed stable chronic changes of the lungs with mild basilar predominant interstitial lung disease.  I have requested the records from Dr. Stallworth's office.  I am going to do a couple of things.  I am going to send him to the lab for baseline labs including a CBC, CMP, and proBNP.  If it turns out he is in fact in some diastolic heart failure, I would then start him on Lasix.      2.  Paroxysmal atrial fibrillation.  I reviewed the Holter monitor results with Dr. Watkins.  He is having atrial fibrillation about 20% of the time.  It is likely this is the cause of all of his symptoms.  I am going to increase his metoprolol back to 50 mg twice daily.  I am also going to start him on Eliquis 5 mg twice daily.  Additionally, I am going to place a referral for electrophysiology.      3.  Coronary artery disease.  History of CABG in 2014.  Normal stress test in December 2019.  He is on aspirin, atorvastatin, and metoprolol.  He denies any chest pain.  His EKG is stable.      Overall I think he is  stable.  He denies any symptoms of angina.  I think it is likely that the frequent atrial fibrillation is causing all of his symptoms.  I do not think he is in acute heart failure, but further recommendations will be made pending the results of the above tests.      As always, it has been a pleasure to participate in your patient's care.      Sincerely,         KORI Vidal   SOB (shortness of breath)

## 2020-07-21 ENCOUNTER — TELEPHONE (OUTPATIENT)
Dept: CARDIOLOGY | Facility: CLINIC | Age: 75
End: 2020-07-21

## 2020-07-21 ENCOUNTER — OFFICE VISIT (OUTPATIENT)
Dept: CARDIOLOGY | Facility: CLINIC | Age: 75
End: 2020-07-21

## 2020-07-21 VITALS
HEIGHT: 66 IN | SYSTOLIC BLOOD PRESSURE: 110 MMHG | BODY MASS INDEX: 33.27 KG/M2 | WEIGHT: 207 LBS | DIASTOLIC BLOOD PRESSURE: 64 MMHG | HEART RATE: 63 BPM

## 2020-07-21 DIAGNOSIS — I48.0 AF (PAROXYSMAL ATRIAL FIBRILLATION) (HCC): Primary | ICD-10-CM

## 2020-07-21 DIAGNOSIS — G47.33 OBSTRUCTIVE SLEEP APNEA (ADULT) (PEDIATRIC): ICD-10-CM

## 2020-07-21 PROCEDURE — 93000 ELECTROCARDIOGRAM COMPLETE: CPT | Performed by: INTERNAL MEDICINE

## 2020-07-21 PROCEDURE — 99205 OFFICE O/P NEW HI 60 MIN: CPT | Performed by: INTERNAL MEDICINE

## 2020-07-21 NOTE — TELEPHONE ENCOUNTER
I spoke with the patient regarding his lab results.  He is not in heart failure.  He has an appointment with Dr. Garvey today.  He has concerns about the cost of his Eliquis.  He went to pick it up at [employer] and was told it was going to be over $200.  Therefore, he did not yet  the prescription.      Maya, can you look into this?  He is not able to afford that every month.  If that will be his cost every month, we will need to start him on warfarin instead.

## 2020-07-21 NOTE — PROGRESS NOTES
Date of Office Visit: 2020  Encounter Provider: Ronen Benitez MD  Place of Service: [facility] CARDIOLOGY  Patient Name: José Moody  :1945    Chief Complaint   Patient presents with   • Atrial Fibrillation   • Shortness of Breath   • Dizziness   :     HPI: José Moody is a 75 y.o. male who presents today for discussion of treatment options for atrial fibrillation.  The patient notes constant severe shortness of breath, activity intolerance, and fatigue.  He occasionally notes palpitations.  He has had a history of atrial fibrillation since cardiac surgery in .  At that time he was briefly managed with amiodarone, and warfarin.  He apparently has been off anticoagulation until fairly recently, when it was suggested that he start Eliquis.  He says symptoms are worse now, than they were 1 year ago.  He can identify no other significant changes in his health.  He has a history of fibrotic changes to his lung, and recently had a follow-up CT, which is stable when compared to 2017.  He had a stress test in 2019, which revealed no evidence of ischemia.  He is not aware of any prior testing for sleep apnea.          Past Medical History:   Diagnosis Date   • Anxiety    • Arthritis     LT HIP   • Atrial fibrillation (CMS/HCC)    • Coronary artery disease    • High cholesterol    • History of atrial fibrillation     LAST HAD -   • Limited joint range of motion     LT HIP   • Osteoarthritis of both hips    • PONV (postoperative nausea and vomiting)     AFTER TONSILLECTOMY       Past Surgical History:   Procedure Laterality Date   • BRONCHOSCOPY Bilateral 2017    Procedure: BRONCHOSCOPY WITH BAL RIGHT AND LEFT UPPER LOBE;  Surgeon: Alin Stallworth MD;  Location: [facility] ENDOSCOPY;  Service:    • CARDIAC SURGERY     • CARPAL TUNNEL RELEASE Right    • COLONOSCOPY     • CORONARY ARTERY BYPASS GRAFT      3 VESSELS    • JOINT REPLACEMENT     •  TONSILLECTOMY     • TOTAL HIP ARTHROPLASTY Left 3/9/2017    Procedure: LT TOTAL HIP ARTHROPLASTY;  Surgeon: José Machado MD;  Location: [facility] OR;  Service:    • TOTAL HIP ARTHROPLASTY REVISION Left 2018    Procedure: LT FEMORAL REVISION;  Surgeon: José Machado MD;  Location: [facility] MAIN OR;  Service: Orthopedics       Social History     Socioeconomic History   • Marital status: Single     Spouse name: Not on file   • Number of children: Not on file   • Years of education: Not on file   • Highest education level: Not on file   Tobacco Use   • Smoking status: Former Smoker     Packs/day: 0.50     Years: 27.00     Pack years: 13.50     Types: Cigarettes     Last attempt to quit:      Years since quittin.5   • Smokeless tobacco: Never Used   • Tobacco comment:  NO CAFFEINE USE   Substance and Sexual Activity   • Alcohol use: No   • Drug use: No   • Sexual activity: Defer       Family History   Problem Relation Age of Onset   • Heart failure Brother    • Heart disease Brother    • Heart attack Brother    • No Known Problems Mother    • No Known Problems Father    • Malig Hyperthermia Neg Hx    • Hypertension Neg Hx    • Hyperlipidemia Neg Hx        Review of Systems   Constitution: Positive for malaise/fatigue.   HENT: Negative.    Eyes: Negative.    Cardiovascular: Positive for dyspnea on exertion and palpitations. Negative for chest pain, leg swelling and near-syncope.   Respiratory: Positive for shortness of breath. Negative for cough.    Endocrine: Negative.    Hematologic/Lymphatic: Negative.    Skin: Negative.    Musculoskeletal: Negative.    Gastrointestinal: Negative.    Genitourinary: Negative.    Neurological: Negative.  Negative for weakness.   Psychiatric/Behavioral: Negative.    Allergic/Immunologic: Negative.        No Known Allergies      Current Outpatient Medications:   •  apixaban (ELIQUIS) 5 MG tablet tablet, Take 1 tablet by mouth Every 12 (Twelve) Hours., Disp: 60  "tablet, Rfl: 11  •  aspirin 81 MG EC tablet, Take 81 mg by mouth Daily., Disp: , Rfl:   •  atorvastatin (LIPITOR) 20 MG tablet, Take 20 mg by mouth Every Evening., Disp: , Rfl:   •  busPIRone (BUSPAR) 15 MG tablet, Take 15 mg by mouth 2 (Two) Times a Day., Disp: , Rfl:   •  DULoxetine (CYMBALTA) 60 MG capsule, Take 1 capsule by mouth Daily., Disp: , Rfl: 4  •  metoprolol tartrate (LOPRESSOR) 50 MG tablet, Take 1 tablet by mouth 2 (Two) Times a Day., Disp: , Rfl:   •  terazosin (HYTRIN) 10 MG capsule, Take 10 mg by mouth Daily., Disp: , Rfl: 3      Objective:     Vitals:    07/21/20 1129   BP: 110/64   BP Location: Right arm   Patient Position: Sitting   Pulse: 63   Weight: 93.9 kg (207 lb)   Height: 167.6 cm (66\")     Body mass index is 33.41 kg/m².    PHYSICAL EXAM:    Physical Exam   Constitutional: He is oriented to person, place, and time. He appears well-nourished. No distress.   Elderly male, no acute distress   HENT:   Head: Normocephalic and atraumatic.   Eyes: Right eye exhibits no discharge. Left eye exhibits no discharge.   Cardiovascular: Normal rate.   Pulmonary/Chest: Effort normal and breath sounds normal.   Neurological: He is alert and oriented to person, place, and time.   Skin: Skin is warm and dry. He is not diaphoretic.   Psychiatric: He has a normal mood and affect. His behavior is normal. Judgment and thought content normal.   Nursing note and vitals reviewed.          ECG 12 Lead  Date/Time: 7/21/2020 2:16 PM  Performed by: Ronen Benitez MD  Authorized by: Ronen Benitez MD   Comparison: compared with previous ECG from 6/29/2020  Similar to previous ECG  Rhythm: sinus rhythm    Clinical impression: normal ECG        Reviewed his echocardiogram.  It shows normal ejection fraction.  I would say moderate left atrial dilation    I personally reviewed the patient's Holter monitor tracings.  They demonstrated a 22% burden of atrial fibrillation over the 9 days of monitoring.  He " marked several episodes of atrial fibrillation.  Longest episode lasted an hour and a half.      Assessment:       Diagnosis Plan   1. AF (paroxysmal atrial fibrillation) (CMS/[facility])  Home Sleep Study   2. Obstructive sleep apnea (adult) (pediatric)   Home Sleep Study          Plan:       1.  Paroxysmal atrial fibrillation  We discussed the diagnosis of atrial fibrillation, his risk factors, and modifiable risk factors.  Specifically we discussed screening for sleep apnea, and weight loss.  We discussed options for treatment of his atrial fibrillation.  The 1C drugs are not indicated, due to his history of coronary artery disease.  I feel that amiodarone is a poor choice, given his underlying lung parenchymal disease.  We discussed that Tikosyn, and sotalol, would be rarely used for paroxysmal atrial fibrillation, and the requirements for hospitalization and need for monitoring with her use.  We discussed ablation, which I feel is his best option.  We discussed the likelihood of success at 65 to 75%, with about one third of patients requiring a second procedure.  We discussed the risk of stroke, cardiac tamponade, injury to the phrenic nerve, and esophagus, and the risk of death.  The patient was reluctant to undergo procedure for his atrial fibrillation.  He is also reluctant to undergo screening for sleep apnea, will was eventually agreeable.  We agreed to screen for sleep apnea, and to start treatment if indicated.  He will follow-up in 3 months, and we can discuss further if he would like to proceed with ablation.    As always, it has been a pleasure to participate in your patient's care.      Sincerely,         Ronen Benitez MD     I spent 72 minutes and in person time discussing the patient's diagnosis, and counseling the patient.

## 2020-07-21 NOTE — TELEPHONE ENCOUNTER
S/W Boaz re: Eliquis Co-pay and it seems to be a deductible issue.     I also advised pt or this and advised pt to call his insurance company to find out what he has left to pay on his deductible. Advised pt to FU w/ me re: this matter to see where I can help w/ providing Eliquis from there. Pt verbalized appreciation and understanding.  I will FU w/ pt w/in next couple days.    SHERLY Trejo

## 2020-07-22 RX ORDER — APIXABAN 5 MG/1
TABLET, FILM COATED ORAL
Qty: 60 TABLET | Refills: 11 | OUTPATIENT
Start: 2020-07-22

## 2020-08-07 ENCOUNTER — HOSPITAL ENCOUNTER (OUTPATIENT)
Dept: SLEEP MEDICINE | Facility: HOSPITAL | Age: 75
Discharge: HOME | End: 2020-08-07
Admitting: INTERNAL MEDICINE

## 2020-08-07 DIAGNOSIS — G47.33 OBSTRUCTIVE SLEEP APNEA (ADULT) (PEDIATRIC): ICD-10-CM

## 2020-08-07 DIAGNOSIS — I48.0 AF (PAROXYSMAL ATRIAL FIBRILLATION) (HCC): ICD-10-CM

## 2020-08-07 PROCEDURE — 95806 SLEEP STUDY UNATT&RESP EFFT: CPT

## 2020-08-07 PROCEDURE — 95806 SLEEP STUDY UNATT&RESP EFFT: CPT | Performed by: INTERNAL MEDICINE

## 2020-08-17 ENCOUNTER — TELEPHONE (OUTPATIENT)
Dept: SLEEP MEDICINE | Facility: HOSPITAL | Age: 75
End: 2020-08-17

## 2020-08-17 NOTE — TELEPHONE ENCOUNTER
Called pt to schedule appt for sleep results.  Appt scheduled on 9/11/2020 @ 10:30 with Dr. Masters.  CV

## 2020-08-24 RX ORDER — APIXABAN 5 MG/1
TABLET, FILM COATED ORAL
Qty: 60 TABLET | Refills: 1 | Status: SHIPPED | OUTPATIENT
Start: 2020-08-24

## 2020-09-11 ENCOUNTER — APPOINTMENT (OUTPATIENT)
Dept: SLEEP MEDICINE | Facility: HOSPITAL | Age: 75
End: 2020-09-11

## 2020-10-27 ENCOUNTER — OFFICE VISIT (OUTPATIENT)
Dept: CARDIOLOGY | Facility: CLINIC | Age: 75
End: 2020-10-27

## 2020-10-27 VITALS
BODY MASS INDEX: 32.14 KG/M2 | WEIGHT: 200 LBS | SYSTOLIC BLOOD PRESSURE: 142 MMHG | DIASTOLIC BLOOD PRESSURE: 84 MMHG | HEIGHT: 66 IN | HEART RATE: 77 BPM

## 2020-10-27 DIAGNOSIS — I25.10 CHRONIC CORONARY ARTERY DISEASE: ICD-10-CM

## 2020-10-27 DIAGNOSIS — I48.0 PAROXYSMAL ATRIAL FIBRILLATION (HCC): Primary | ICD-10-CM

## 2020-10-27 PROCEDURE — 93000 ELECTROCARDIOGRAM COMPLETE: CPT | Performed by: INTERNAL MEDICINE

## 2020-10-27 PROCEDURE — 99214 OFFICE O/P EST MOD 30 MIN: CPT | Performed by: INTERNAL MEDICINE

## 2020-10-27 RX ORDER — SOTALOL HYDROCHLORIDE 120 MG/1
TABLET ORAL
COMMUNITY
Start: 2020-08-16

## 2020-10-27 RX ORDER — SPIRONOLACTONE 25 MG/1
25 TABLET ORAL DAILY
COMMUNITY
Start: 2020-10-05 | End: 2020-11-04

## 2020-10-27 RX ORDER — CLOPIDOGREL BISULFATE 75 MG/1
TABLET ORAL
COMMUNITY
Start: 2020-08-16

## 2020-10-27 NOTE — PROGRESS NOTES
Date of Office Visit: 10/27/2020  Encounter Provider: Ronen Benitez MD  Place of Service: [facility] CARDIOLOGY  Patient Name: José Moody  :1945    Chief Complaint   Patient presents with   • Atrial Fibrillation     3 mnth follow up   :     HPI: José Moody is a 75 y.o. male who presents today for paroxysmal atrial fibrillation.  In the interim since the last visit the patient saw Dr. Quiroga at [facility], and ultimately had a coronary angiography and stenting of a left main lesion.  While he was there he had some atrial fibrillation, and was started on sotalol.  He is seeing Dr. Tommie Omer on .  He reports some improvement in his fatigue since the stenting.  He has not noticed any atrial fibrillation.  He still desires not to have an ablation, which she had discussed at our last visit.  He says he is going to continue to follow here, and not with the [facility] providers.          Past Medical History:   Diagnosis Date   • Anxiety    • Arthritis     LT HIP   • Atrial fibrillation (CMS/HCC)    • Coronary artery disease    • High cholesterol    • History of atrial fibrillation     LAST HAD    • Limited joint range of motion     LT HIP   • Osteoarthritis of both hips    • PONV (postoperative nausea and vomiting)     AFTER TONSILLECTOMY       Past Surgical History:   Procedure Laterality Date   • BRONCHOSCOPY Bilateral 2017    Procedure: BRONCHOSCOPY WITH BAL RIGHT AND LEFT UPPER LOBE;  Surgeon: Alin Stallworth MD;  Location: [facility] ENDOSCOPY;  Service:    • CARDIAC SURGERY     • CARPAL TUNNEL RELEASE Right    • COLONOSCOPY     • CORONARY ARTERY BYPASS GRAFT  2014    3 VESSELS    • JOINT REPLACEMENT     • TONSILLECTOMY     • TOTAL HIP ARTHROPLASTY Left 3/9/2017    Procedure: LT TOTAL HIP ARTHROPLASTY;  Surgeon: José Machado MD;  Location: [facility] OR;  Service:    • TOTAL HIP ARTHROPLASTY REVISION Left 2018     Procedure: LT FEMORAL REVISION;  Surgeon: José Machado MD;  Location: [facility] OR;  Service: Orthopedics       Social History     Socioeconomic History   • Marital status: Single     Spouse name: Not on file   • Number of children: Not on file   • Years of education: Not on file   • Highest education level: Not on file   Tobacco Use   • Smoking status: Former Smoker     Packs/day: 0.50     Years: 27.00     Pack years: 13.50     Types: Cigarettes     Quit date:      Years since quittin.8   • Smokeless tobacco: Never Used   • Tobacco comment:  NO CAFFEINE USE   Substance and Sexual Activity   • Alcohol use: No   • Drug use: No   • Sexual activity: Defer       Family History   Problem Relation Age of Onset   • Heart failure Brother    • Heart disease Brother    • Heart attack Brother    • No Known Problems Mother    • No Known Problems Father    • Malig Hyperthermia Neg Hx    • Hypertension Neg Hx    • Hyperlipidemia Neg Hx        Review of Systems   Constitution: Positive for malaise/fatigue.   HENT: Negative.    Eyes: Negative.    Cardiovascular: Negative for chest pain, dyspnea on exertion, leg swelling and near-syncope.   Respiratory: Negative for cough and shortness of breath.    Endocrine: Negative.    Hematologic/Lymphatic: Negative.    Skin: Negative.    Musculoskeletal: Negative.    Gastrointestinal: Negative.    Genitourinary: Negative.    Neurological: Negative.  Negative for weakness.   Psychiatric/Behavioral: Negative.    Allergic/Immunologic: Negative.        No Known Allergies      Current Outpatient Medications:   •  atorvastatin (LIPITOR) 20 MG tablet, Take 20 mg by mouth Every Evening., Disp: , Rfl:   •  busPIRone (BUSPAR) 15 MG tablet, Take 15 mg by mouth 2 (Two) Times a Day., Disp: , Rfl:   •  clopidogrel (PLAVIX) 75 MG tablet, , Disp: , Rfl:   •  DULoxetine (CYMBALTA) 60 MG capsule, Take 1 capsule by mouth Daily., Disp: , Rfl: 4  •  ELIQUIS 5 MG tablet tablet, TAKE 1 TABLET BY MOUTH  "EVERY 12 HOURS, Disp: 60 tablet, Rfl: 1  •  sotalol (BETAPACE) 120 MG tablet, , Disp: , Rfl:   •  spironolactone (ALDACTONE) 25 MG tablet, Take 25 mg by mouth Daily., Disp: , Rfl:   •  terazosin (HYTRIN) 10 MG capsule, Take 10 mg by mouth Daily., Disp: , Rfl: 3  •  aspirin 81 MG EC tablet, Take 81 mg by mouth Daily., Disp: , Rfl:   •  metoprolol tartrate (LOPRESSOR) 50 MG tablet, Take 1 tablet by mouth 2 (Two) Times a Day., Disp: , Rfl:       Objective:     Vitals:    10/27/20 1052   BP: 142/84   BP Location: Left arm   Patient Position: Sitting   Cuff Size: Adult   Pulse: 77   Weight: 90.7 kg (200 lb)   Height: 167.6 cm (66\")     Body mass index is 32.28 kg/m².    PHYSICAL EXAM:    Vitals signs and nursing note reviewed.   Constitutional:       Appearance: Normal and healthy appearance.   HENT:    Mouth/Throat:      Pharynx: Oropharynx is clear.   Pulmonary:      Effort: Pulmonary effort is normal.   Cardiovascular:      Normal rate. Regular rhythm.      Murmurs: There is no murmur.   Skin:     General: Skin is warm.   Neurological:      General: No focal deficit present.      Mental Status: Alert, oriented to person, place, and time and oriented to person, place and time.   Psychiatric:         Attention and Perception: Attention and perception normal.         Mood and Affect: Mood and affect normal.         Speech: Speech normal.         Cognition and Memory: Cognition and memory normal.         Judgment: Judgment normal.             ECG 12 Lead    Date/Time: 10/27/2020 4:10 PM  Performed by: Ronen Benitez MD  Authorized by: Ronen Benitez MD   Comparison: compared with previous ECG from 7/20/2020  Similar to previous ECG  Rhythm: sinus rhythm  Ectopy: atrial premature contractions  Conduction: right bundle branch block    Clinical impression: abnormal EKG              Assessment:       Diagnosis Plan   1. Paroxysmal atrial fibrillation (CMS/HCC)     2. Chronic coronary artery disease          "   Plan:       Patient with paroxysmal atrial fibrillation, newly initiated on sotalol by Dr. Omer.  QT interval is okay today.  He seems to be a little bit unclear on his treatment plan.  He could not immediately recall when he had started on sotalol treatment.  He states that he is going to follow-up here, but I am concerned this will not happen.  I did ask ask him to come back and see me in 6 months with an EKG at that time.  Stress importance was compliance with sotalol therapy, and the need for periodic EKGs and renal function monitoring.  I still think ablation is probably a better option for this patient, than sotalol given the paroxysmal nature of his atrial fibrillation, but he continues to adamantly refuse the procedure, even though he did just undergo a stenting of his left main.    As always, it has been a pleasure to participate in your patient's care.      Sincerely,         Ronen Benitez MD

## 2020-11-03 RX ORDER — APIXABAN 5 MG/1
TABLET, FILM COATED ORAL
Qty: 60 TABLET | Refills: 1 | OUTPATIENT
Start: 2020-11-03

## 2020-11-03 NOTE — TELEPHONE ENCOUNTER
Pt's daughter returned the call.  She states her Dad is going to stay with [facility](Dr. Quiroga and Dr. Omer), she asked if the upcoming appointments with Cassi Fabian and Dr. Watkins be cancelled.  Thanks/hue    Yazmin(daughter)# [phone]

## 2022-05-02 ENCOUNTER — TRANSCRIBE ORDERS (OUTPATIENT)
Dept: ADMINISTRATIVE | Facility: HOSPITAL | Age: 77
End: 2022-05-02

## 2022-05-02 DIAGNOSIS — Z01.818 PREOPERATIVE EXAMINATION, UNSPECIFIED: Primary | ICD-10-CM

## 2022-05-02 DIAGNOSIS — I10 HTN (HYPERTENSION), BENIGN: ICD-10-CM

## 2022-05-02 DIAGNOSIS — I27.23 PULMONARY HYPERTENSION DUE TO LUNG DISEASE: Primary | ICD-10-CM

## 2022-05-02 DIAGNOSIS — J84.10 PULMONARY FIBROSIS: ICD-10-CM

## 2022-05-02 DIAGNOSIS — J84.9 INTERSTITIAL LUNG DISEASE: ICD-10-CM

## 2022-05-02 DIAGNOSIS — R06.02 SHORTNESS OF BREATH: ICD-10-CM

## 2022-05-10 ENCOUNTER — LAB (OUTPATIENT)
Dept: LAB | Facility: HOSPITAL | Age: 77
End: 2022-05-10

## 2022-05-10 DIAGNOSIS — Z01.818 PREOPERATIVE EXAMINATION, UNSPECIFIED: ICD-10-CM

## 2022-05-10 LAB — SARS-COV-2 ORF1AB RESP QL NAA+PROBE: NOT DETECTED

## 2022-05-10 PROCEDURE — U0005 INFEC AGEN DETEC AMPLI PROBE: HCPCS

## 2022-05-10 PROCEDURE — C9803 HOPD COVID-19 SPEC COLLECT: HCPCS

## 2022-05-10 PROCEDURE — U0004 COV-19 TEST NON-CDC HGH THRU: HCPCS

## 2022-05-11 ENCOUNTER — HOSPITAL ENCOUNTER (OUTPATIENT)
Dept: RESPIRATORY THERAPY | Facility: HOSPITAL | Age: 77
Discharge: HOME | End: 2022-05-11
Admitting: INTERNAL MEDICINE

## 2022-05-11 DIAGNOSIS — R06.02 SHORTNESS OF BREATH: ICD-10-CM

## 2022-05-11 PROCEDURE — 94640 AIRWAY INHALATION TREATMENT: CPT

## 2022-05-11 PROCEDURE — 94726 PLETHYSMOGRAPHY LUNG VOLUMES: CPT

## 2022-05-11 PROCEDURE — 94060 EVALUATION OF WHEEZING: CPT

## 2022-05-11 PROCEDURE — 94729 DIFFUSING CAPACITY: CPT

## 2022-05-11 PROCEDURE — 94664 DEMO&/EVAL PT USE INHALER: CPT

## 2022-05-11 RX ORDER — ALBUTEROL SULFATE 2.5 MG/3ML
2.5 SOLUTION RESPIRATORY (INHALATION) ONCE
Status: COMPLETED | OUTPATIENT
Start: 2022-05-11 | End: 2022-05-11

## 2022-05-11 RX ADMIN — ALBUTEROL SULFATE 2.5 MG: 2.5 SOLUTION RESPIRATORY (INHALATION) at 13:32

## 2022-05-13 ENCOUNTER — HOSPITAL ENCOUNTER (OUTPATIENT)
Dept: CT IMAGING | Facility: HOSPITAL | Age: 77
Discharge: HOME | End: 2022-05-13
Admitting: INTERNAL MEDICINE

## 2022-05-13 DIAGNOSIS — J84.10 PULMONARY FIBROSIS: ICD-10-CM

## 2022-05-13 DIAGNOSIS — J84.9 INTERSTITIAL LUNG DISEASE: ICD-10-CM

## 2022-05-13 PROCEDURE — 71250 CT THORAX DX C-: CPT

## 2022-05-25 ENCOUNTER — APPOINTMENT (OUTPATIENT)
Dept: CT IMAGING | Facility: HOSPITAL | Age: 77
End: 2022-05-25

## (undated) DEVICE — ADAPT SWVL FIBROPTIC BRONCH

## (undated) DEVICE — BIT DRL RNGLC QC 3.2X40MM

## (undated) DEVICE — SINGLE USE SUCTION VALVE MAJ-209: Brand: SINGLE USE SUCTION VALVE (STERILE)

## (undated) DEVICE — SUT VICRYL 1 CT1 27IN  JJ40G

## (undated) DEVICE — PREMIUM WET SKIN PREP TRAY: Brand: MEDLINE INDUSTRIES, INC.

## (undated) DEVICE — APPL DURAPREP IODOPHOR APL 26ML

## (undated) DEVICE — SYR LUERLOK 20CC

## (undated) DEVICE — PK HIP TOTL 40

## (undated) DEVICE — DRSNG ADAPTIC 3X8

## (undated) DEVICE — ANTIBACTERIAL UNDYED BRAIDED (POLYGLACTIN 910), SYNTHETIC ABSORBABLE SUTURE: Brand: COATED VICRYL

## (undated) DEVICE — NDL SPINE 20G 3 1/2 YEL STRL 1P/U

## (undated) DEVICE — ENCORE® LATEX ORTHO SIZE 8.5, STERILE LATEX POWDER-FREE SURGICAL GLOVE: Brand: ENCORE

## (undated) DEVICE — GAUZE,SPONGE,FLUFF,6"X6.75",STRL,10/TRAY: Brand: MEDLINE

## (undated) DEVICE — KT DRN EVAC WND PVC PCH WTROC RND 10F400

## (undated) DEVICE — VITAL SIGNS™ JACKSON-REES CIRCUITS: Brand: VITAL SIGNS™

## (undated) DEVICE — TRAP,MUCUS SPECIMEN, 80CC: Brand: MEDLINE

## (undated) DEVICE — PAD,ABDOMINAL,8"X10",ST,LF: Brand: MEDLINE

## (undated) DEVICE — BIT DRL RNGLC QC 3.2X20MM

## (undated) DEVICE — GLV SURG TRIUMPH CLASSIC PF LTX 8 STRL

## (undated) DEVICE — CANNULA,ADULT,SOFT-TOUCH,7TUBE,SC: Brand: MEDLINE

## (undated) DEVICE — BANDAGE,GAUZE,BULKEE II,2.25"X3YD,STRL: Brand: MEDLINE

## (undated) DEVICE — DRSNG WND GZ CURAD OIL EMULSION 3X8IN LF STRL 1PK

## (undated) DEVICE — CULT AER/ANAEROB FASTIDIOUS BACT

## (undated) DEVICE — MSK AIRWY LARYNG LMA UNIQUE STD PK SZ4

## (undated) DEVICE — LN SMPL O2 NASL/ORL SMART/CAPNOLINE PLS A/

## (undated) DEVICE — BITEBLOCK OMNI BLOC

## (undated) DEVICE — BANDAGE,GAUZE,BULKEE II,4.5"X4.1YD,STRL: Brand: MEDLINE

## (undated) DEVICE — TUBING, SUCTION, 1/4" X 10', STRAIGHT: Brand: MEDLINE

## (undated) DEVICE — SINGLE USE BIOPSY VALVE MAJ-210: Brand: SINGLE USE BIOPSY VALVE (STERILE)